# Patient Record
Sex: FEMALE | Race: WHITE | NOT HISPANIC OR LATINO | Employment: OTHER | URBAN - METROPOLITAN AREA
[De-identification: names, ages, dates, MRNs, and addresses within clinical notes are randomized per-mention and may not be internally consistent; named-entity substitution may affect disease eponyms.]

---

## 2024-05-06 PROCEDURE — 94761 N-INVAS EAR/PLS OXIMETRY MLT: CPT

## 2024-05-07 ENCOUNTER — APPOINTMENT (OUTPATIENT)
Dept: CARDIOLOGY | Facility: HOSPITAL | Age: 64
DRG: 189 | End: 2024-05-07
Payer: MEDICARE

## 2024-05-07 ENCOUNTER — APPOINTMENT (OUTPATIENT)
Dept: ULTRASOUND IMAGING | Facility: HOSPITAL | Age: 64
DRG: 189 | End: 2024-05-07
Payer: MEDICARE

## 2024-05-07 ENCOUNTER — HOSPITAL ENCOUNTER (INPATIENT)
Facility: HOSPITAL | Age: 64
LOS: 7 days | Discharge: HOME | DRG: 189 | End: 2024-05-14
Attending: INTERNAL MEDICINE | Admitting: INTERNAL MEDICINE
Payer: MEDICARE

## 2024-05-07 DIAGNOSIS — J44.1 CHRONIC OBSTRUCTIVE PULMONARY DISEASE WITH ACUTE EXACERBATION: ICD-10-CM

## 2024-05-07 DIAGNOSIS — R91.8 LUNG MASS: Primary | ICD-10-CM

## 2024-05-07 DIAGNOSIS — J96.01 ACUTE HYPOXIC RESPIRATORY FAILURE: ICD-10-CM

## 2024-05-07 LAB
ALBUMIN SERPL-MCNC: 3 G/DL (ref 3.5–5.2)
ALBUMIN/GLOB SERPL: 1.1 G/DL
ALP SERPL-CCNC: 85 U/L (ref 39–117)
ALT SERPL W P-5'-P-CCNC: 16 U/L (ref 1–33)
ANION GAP SERPL CALCULATED.3IONS-SCNC: 10 MMOL/L (ref 5–15)
AORTIC DIMENSIONLESS INDEX: 0.87 (DI)
ARTERIAL PATENCY WRIST A: ABNORMAL
AST SERPL-CCNC: 19 U/L (ref 1–32)
ATMOSPHERIC PRESS: ABNORMAL MM[HG]
BASE EXCESS BLDA CALC-SCNC: 8.3 MMOL/L (ref 0–3)
BASOPHILS # BLD AUTO: 0.02 10*3/MM3 (ref 0–0.2)
BASOPHILS NFR BLD AUTO: 0.2 % (ref 0–1.5)
BDY SITE: ABNORMAL
BH CV ECHO MEAS - AO MAX PG: 7.4 MMHG
BH CV ECHO MEAS - AO MEAN PG: 4 MMHG
BH CV ECHO MEAS - AO V2 MAX: 136 CM/SEC
BH CV ECHO MEAS - AO V2 VTI: 23.1 CM
BH CV ECHO MEAS - AVA(I,D): 3.5 CM2
BH CV ECHO MEAS - EDV(CUBED): 132.7 ML
BH CV ECHO MEAS - EDV(MOD-SP2): 74.5 ML
BH CV ECHO MEAS - EDV(MOD-SP4): 65.5 ML
BH CV ECHO MEAS - EF(MOD-BP): 59.3 %
BH CV ECHO MEAS - EF(MOD-SP2): 60.1 %
BH CV ECHO MEAS - EF(MOD-SP4): 64.3 %
BH CV ECHO MEAS - ESV(CUBED): 39.3 ML
BH CV ECHO MEAS - ESV(MOD-SP2): 29.7 ML
BH CV ECHO MEAS - ESV(MOD-SP4): 23.4 ML
BH CV ECHO MEAS - FS: 33.3 %
BH CV ECHO MEAS - IVS/LVPW: 1.13 CM
BH CV ECHO MEAS - IVSD: 0.9 CM
BH CV ECHO MEAS - LA DIMENSION: 4.7 CM
BH CV ECHO MEAS - LAT PEAK E' VEL: 8.1 CM/SEC
BH CV ECHO MEAS - LV DIASTOLIC VOL/BSA (35-75): 31.1 CM2
BH CV ECHO MEAS - LV MASS(C)D: 151.8 GRAMS
BH CV ECHO MEAS - LV MAX PG: 5.6 MMHG
BH CV ECHO MEAS - LV MEAN PG: 3 MMHG
BH CV ECHO MEAS - LV SYSTOLIC VOL/BSA (12-30): 11.1 CM2
BH CV ECHO MEAS - LV V1 MAX: 118 CM/SEC
BH CV ECHO MEAS - LV V1 VTI: 19.7 CM
BH CV ECHO MEAS - LVIDD: 5.1 CM
BH CV ECHO MEAS - LVIDS: 3.4 CM
BH CV ECHO MEAS - LVOT AREA: 4.2 CM2
BH CV ECHO MEAS - LVOT DIAM: 2.3 CM
BH CV ECHO MEAS - LVPWD: 0.8 CM
BH CV ECHO MEAS - MED PEAK E' VEL: 7.2 CM/SEC
BH CV ECHO MEAS - MR MAX PG: 28.7 MMHG
BH CV ECHO MEAS - MR MAX VEL: 268 CM/SEC
BH CV ECHO MEAS - MV A MAX VEL: 129 CM/SEC
BH CV ECHO MEAS - MV DEC SLOPE: 647 CM/SEC2
BH CV ECHO MEAS - MV DEC TIME: 0.19 SEC
BH CV ECHO MEAS - MV E MAX VEL: 130 CM/SEC
BH CV ECHO MEAS - MV E/A: 1.01
BH CV ECHO MEAS - MV MAX PG: 8.1 MMHG
BH CV ECHO MEAS - MV MEAN PG: 5 MMHG
BH CV ECHO MEAS - MV P1/2T: 46.6 MSEC
BH CV ECHO MEAS - MV V2 VTI: 29.5 CM
BH CV ECHO MEAS - MVA(P1/2T): 4.7 CM2
BH CV ECHO MEAS - MVA(VTI): 2.8 CM2
BH CV ECHO MEAS - PA ACC TIME: 0.09 SEC
BH CV ECHO MEAS - PA V2 MAX: 85.8 CM/SEC
BH CV ECHO MEAS - RAP SYSTOLE: 15 MMHG
BH CV ECHO MEAS - RV MAX PG: 2.07 MMHG
BH CV ECHO MEAS - RV V1 MAX: 71.9 CM/SEC
BH CV ECHO MEAS - RV V1 VTI: 13.1 CM
BH CV ECHO MEAS - RVSP: 29.1 MMHG
BH CV ECHO MEAS - SV(LVOT): 81.8 ML
BH CV ECHO MEAS - SV(MOD-SP2): 44.8 ML
BH CV ECHO MEAS - SV(MOD-SP4): 42.1 ML
BH CV ECHO MEAS - SVI(LVOT): 38.8 ML/M2
BH CV ECHO MEAS - SVI(MOD-SP2): 21.3 ML/M2
BH CV ECHO MEAS - SVI(MOD-SP4): 20 ML/M2
BH CV ECHO MEAS - TAPSE (>1.6): 2.29 CM
BH CV ECHO MEAS - TR MAX PG: 14.1 MMHG
BH CV ECHO MEAS - TR MAX VEL: 188 CM/SEC
BH CV ECHO MEASUREMENTS AVERAGE E/E' RATIO: 16.99
BH CV XLRA - TDI S': 9 CM/SEC
BILIRUB SERPL-MCNC: 1.6 MG/DL (ref 0–1.2)
BUN SERPL-MCNC: 12 MG/DL (ref 8–23)
BUN/CREAT SERPL: 20.3 (ref 7–25)
CALCIUM SPEC-SCNC: 8.3 MG/DL (ref 8.6–10.5)
CHLORIDE SERPL-SCNC: 99 MMOL/L (ref 98–107)
CHOLEST SERPL-MCNC: 81 MG/DL (ref 0–200)
CO2 BLDA-SCNC: 43 MMOL/L (ref 22–29)
CO2 SERPL-SCNC: 31 MMOL/L (ref 22–29)
CREAT SERPL-MCNC: 0.59 MG/DL (ref 0.57–1)
DEPRECATED RDW RBC AUTO: 56.5 FL (ref 37–54)
EGFRCR SERPLBLD CKD-EPI 2021: 100.2 ML/MIN/1.73
EOSINOPHIL # BLD AUTO: 0.03 10*3/MM3 (ref 0–0.4)
EOSINOPHIL NFR BLD AUTO: 0.3 % (ref 0.3–6.2)
ERYTHROCYTE [DISTWIDTH] IN BLOOD BY AUTOMATED COUNT: 17.4 % (ref 12.3–15.4)
GLOBULIN UR ELPH-MCNC: 2.8 GM/DL
GLUCOSE BLDC GLUCOMTR-MCNC: 114 MG/DL (ref 70–105)
GLUCOSE SERPL-MCNC: 102 MG/DL (ref 65–99)
HBA1C MFR BLD: 5.9 % (ref 4.8–5.6)
HCO3 BLDA-SCNC: 40.5 MMOL/L (ref 21–28)
HCT VFR BLD AUTO: 59.9 % (ref 34–46.6)
HDLC SERPL-MCNC: 34 MG/DL (ref 40–60)
HEMODILUTION: NO
HGB BLD-MCNC: 18.4 G/DL (ref 12–15.9)
IMM GRANULOCYTES # BLD AUTO: 0.02 10*3/MM3 (ref 0–0.05)
IMM GRANULOCYTES NFR BLD AUTO: 0.2 % (ref 0–0.5)
INHALED O2 CONCENTRATION: 36 %
LDLC SERPL CALC-MCNC: 33 MG/DL (ref 0–100)
LDLC/HDLC SERPL: 1.03 {RATIO}
LYMPHOCYTES # BLD AUTO: 1.15 10*3/MM3 (ref 0.7–3.1)
LYMPHOCYTES NFR BLD AUTO: 12.9 % (ref 19.6–45.3)
MCH RBC QN AUTO: 29.1 PG (ref 26.6–33)
MCHC RBC AUTO-ENTMCNC: 30.7 G/DL (ref 31.5–35.7)
MCV RBC AUTO: 94.6 FL (ref 79–97)
MODALITY: ABNORMAL
MONOCYTES # BLD AUTO: 0.6 10*3/MM3 (ref 0.1–0.9)
MONOCYTES NFR BLD AUTO: 6.7 % (ref 5–12)
NEUTROPHILS NFR BLD AUTO: 7.08 10*3/MM3 (ref 1.7–7)
NEUTROPHILS NFR BLD AUTO: 79.7 % (ref 42.7–76)
NRBC BLD AUTO-RTO: 0 /100 WBC (ref 0–0.2)
NT-PROBNP SERPL-MCNC: 1213 PG/ML (ref 0–900)
PCO2 BLDA: 81.3 MM HG (ref 35–48)
PH BLDA: 7.3 PH UNITS (ref 7.35–7.45)
PLATELET # BLD AUTO: 108 10*3/MM3 (ref 140–450)
PMV BLD AUTO: 10.2 FL (ref 6–12)
PO2 BLDA: 75.8 MM HG (ref 83–108)
POTASSIUM SERPL-SCNC: 3.8 MMOL/L (ref 3.5–5.2)
PROT SERPL-MCNC: 5.8 G/DL (ref 6–8.5)
RBC # BLD AUTO: 6.33 10*6/MM3 (ref 3.77–5.28)
RBC MORPH BLD: NORMAL
SAO2 % BLDCOA: 92.4 % (ref 94–98)
SINUS: 3.8 CM
SMALL PLATELETS BLD QL SMEAR: NORMAL
SODIUM SERPL-SCNC: 140 MMOL/L (ref 136–145)
STJ: 3.3 CM
TRIGL SERPL-MCNC: 60 MG/DL (ref 0–150)
TSH SERPL DL<=0.05 MIU/L-ACNC: 0.61 UIU/ML (ref 0.27–4.2)
VLDLC SERPL-MCNC: 14 MG/DL (ref 5–40)
WBC MORPH BLD: NORMAL
WBC NRBC COR # BLD AUTO: 8.9 10*3/MM3 (ref 3.4–10.8)

## 2024-05-07 PROCEDURE — 99222 1ST HOSP IP/OBS MODERATE 55: CPT | Performed by: INTERNAL MEDICINE

## 2024-05-07 PROCEDURE — 25010000002 FUROSEMIDE PER 20 MG: Performed by: NURSE PRACTITIONER

## 2024-05-07 PROCEDURE — 25010000002 SULFUR HEXAFLUORIDE MICROSPH 60.7-25 MG RECONSTITUTED SUSPENSION: Performed by: STUDENT IN AN ORGANIZED HEALTH CARE EDUCATION/TRAINING PROGRAM

## 2024-05-07 PROCEDURE — 83036 HEMOGLOBIN GLYCOSYLATED A1C: CPT | Performed by: NURSE PRACTITIONER

## 2024-05-07 PROCEDURE — 83880 ASSAY OF NATRIURETIC PEPTIDE: CPT | Performed by: NURSE PRACTITIONER

## 2024-05-07 PROCEDURE — 80061 LIPID PANEL: CPT | Performed by: NURSE PRACTITIONER

## 2024-05-07 PROCEDURE — 85007 BL SMEAR W/DIFF WBC COUNT: CPT | Performed by: NURSE PRACTITIONER

## 2024-05-07 PROCEDURE — 82948 REAGENT STRIP/BLOOD GLUCOSE: CPT

## 2024-05-07 PROCEDURE — 25010000002 METHYLPREDNISOLONE PER 40 MG: Performed by: STUDENT IN AN ORGANIZED HEALTH CARE EDUCATION/TRAINING PROGRAM

## 2024-05-07 PROCEDURE — 93306 TTE W/DOPPLER COMPLETE: CPT | Performed by: INTERNAL MEDICINE

## 2024-05-07 PROCEDURE — 85025 COMPLETE CBC W/AUTO DIFF WBC: CPT | Performed by: NURSE PRACTITIONER

## 2024-05-07 PROCEDURE — 94761 N-INVAS EAR/PLS OXIMETRY MLT: CPT

## 2024-05-07 PROCEDURE — 84443 ASSAY THYROID STIM HORMONE: CPT | Performed by: NURSE PRACTITIONER

## 2024-05-07 PROCEDURE — 82746 ASSAY OF FOLIC ACID SERUM: CPT | Performed by: NURSE PRACTITIONER

## 2024-05-07 PROCEDURE — 94799 UNLISTED PULMONARY SVC/PX: CPT

## 2024-05-07 PROCEDURE — 94640 AIRWAY INHALATION TREATMENT: CPT

## 2024-05-07 PROCEDURE — 82803 BLOOD GASES ANY COMBINATION: CPT | Performed by: STUDENT IN AN ORGANIZED HEALTH CARE EDUCATION/TRAINING PROGRAM

## 2024-05-07 PROCEDURE — 81270 JAK2 GENE: CPT | Performed by: NURSE PRACTITIONER

## 2024-05-07 PROCEDURE — 93306 TTE W/DOPPLER COMPLETE: CPT

## 2024-05-07 PROCEDURE — 82607 VITAMIN B-12: CPT | Performed by: NURSE PRACTITIONER

## 2024-05-07 PROCEDURE — 36600 WITHDRAWAL OF ARTERIAL BLOOD: CPT | Performed by: STUDENT IN AN ORGANIZED HEALTH CARE EDUCATION/TRAINING PROGRAM

## 2024-05-07 PROCEDURE — 80053 COMPREHEN METABOLIC PANEL: CPT | Performed by: NURSE PRACTITIONER

## 2024-05-07 PROCEDURE — 76705 ECHO EXAM OF ABDOMEN: CPT

## 2024-05-07 RX ORDER — NITROGLYCERIN 0.4 MG/1
0.4 TABLET SUBLINGUAL
Status: DISCONTINUED | OUTPATIENT
Start: 2024-05-07 | End: 2024-05-14 | Stop reason: HOSPADM

## 2024-05-07 RX ORDER — SODIUM CHLORIDE 0.9 % (FLUSH) 0.9 %
10 SYRINGE (ML) INJECTION AS NEEDED
Status: DISCONTINUED | OUTPATIENT
Start: 2024-05-07 | End: 2024-05-14 | Stop reason: HOSPADM

## 2024-05-07 RX ORDER — ALPRAZOLAM 1 MG/1
1 TABLET ORAL 3 TIMES DAILY PRN
Status: DISCONTINUED | OUTPATIENT
Start: 2024-05-07 | End: 2024-05-07

## 2024-05-07 RX ORDER — ALBUTEROL SULFATE 90 UG/1
2 AEROSOL, METERED RESPIRATORY (INHALATION) EVERY 4 HOURS PRN
COMMUNITY

## 2024-05-07 RX ORDER — ONDANSETRON 2 MG/ML
4 INJECTION INTRAMUSCULAR; INTRAVENOUS EVERY 6 HOURS PRN
Status: DISCONTINUED | OUTPATIENT
Start: 2024-05-07 | End: 2024-05-14 | Stop reason: HOSPADM

## 2024-05-07 RX ORDER — ALPRAZOLAM 1 MG/1
1 TABLET ORAL 3 TIMES DAILY PRN
COMMUNITY

## 2024-05-07 RX ORDER — PREDNISONE 20 MG/1
40 TABLET ORAL
Status: DISCONTINUED | OUTPATIENT
Start: 2024-05-08 | End: 2024-05-07

## 2024-05-07 RX ORDER — METHYLPREDNISOLONE SODIUM SUCCINATE 40 MG/ML
40 INJECTION, POWDER, LYOPHILIZED, FOR SOLUTION INTRAMUSCULAR; INTRAVENOUS EVERY 8 HOURS SCHEDULED
Status: DISCONTINUED | OUTPATIENT
Start: 2024-05-07 | End: 2024-05-07

## 2024-05-07 RX ORDER — IPRATROPIUM BROMIDE AND ALBUTEROL SULFATE 2.5; .5 MG/3ML; MG/3ML
3 SOLUTION RESPIRATORY (INHALATION)
Status: DISCONTINUED | OUTPATIENT
Start: 2024-05-07 | End: 2024-05-14 | Stop reason: HOSPADM

## 2024-05-07 RX ORDER — METOPROLOL SUCCINATE 25 MG/1
25 TABLET, EXTENDED RELEASE ORAL EVERY 12 HOURS SCHEDULED
Status: DISCONTINUED | OUTPATIENT
Start: 2024-05-07 | End: 2024-05-14 | Stop reason: HOSPADM

## 2024-05-07 RX ORDER — BUDESONIDE AND FORMOTEROL FUMARATE DIHYDRATE 160; 4.5 UG/1; UG/1
2 AEROSOL RESPIRATORY (INHALATION)
Status: DISCONTINUED | OUTPATIENT
Start: 2024-05-07 | End: 2024-05-07

## 2024-05-07 RX ORDER — AMOXICILLIN 250 MG
2 CAPSULE ORAL 2 TIMES DAILY PRN
Status: DISCONTINUED | OUTPATIENT
Start: 2024-05-07 | End: 2024-05-14 | Stop reason: HOSPADM

## 2024-05-07 RX ORDER — ACETAMINOPHEN 325 MG/1
650 TABLET ORAL EVERY 8 HOURS PRN
Status: DISCONTINUED | OUTPATIENT
Start: 2024-05-07 | End: 2024-05-14 | Stop reason: HOSPADM

## 2024-05-07 RX ORDER — POTASSIUM CHLORIDE 20 MEQ/1
20 TABLET, EXTENDED RELEASE ORAL 2 TIMES DAILY WITH MEALS
Status: DISCONTINUED | OUTPATIENT
Start: 2024-05-07 | End: 2024-05-14 | Stop reason: HOSPADM

## 2024-05-07 RX ORDER — ALPRAZOLAM 1 MG/1
1 TABLET ORAL 3 TIMES DAILY
Status: DISCONTINUED | OUTPATIENT
Start: 2024-05-07 | End: 2024-05-11

## 2024-05-07 RX ORDER — FLUOXETINE 10 MG/1
10 CAPSULE ORAL DAILY
COMMUNITY

## 2024-05-07 RX ORDER — FAMOTIDINE 20 MG/1
40 TABLET, FILM COATED ORAL DAILY
Status: DISCONTINUED | OUTPATIENT
Start: 2024-05-07 | End: 2024-05-07

## 2024-05-07 RX ORDER — BUDESONIDE 0.5 MG/2ML
0.5 INHALANT ORAL
Status: DISCONTINUED | OUTPATIENT
Start: 2024-05-07 | End: 2024-05-14 | Stop reason: HOSPADM

## 2024-05-07 RX ORDER — SODIUM CHLORIDE 9 MG/ML
40 INJECTION, SOLUTION INTRAVENOUS AS NEEDED
Status: DISCONTINUED | OUTPATIENT
Start: 2024-05-07 | End: 2024-05-14 | Stop reason: HOSPADM

## 2024-05-07 RX ORDER — FUROSEMIDE 10 MG/ML
40 INJECTION INTRAMUSCULAR; INTRAVENOUS EVERY 12 HOURS
Status: DISCONTINUED | OUTPATIENT
Start: 2024-05-07 | End: 2024-05-11

## 2024-05-07 RX ORDER — METHYLPREDNISOLONE SODIUM SUCCINATE 40 MG/ML
40 INJECTION, POWDER, LYOPHILIZED, FOR SOLUTION INTRAMUSCULAR; INTRAVENOUS EVERY 12 HOURS
Status: DISCONTINUED | OUTPATIENT
Start: 2024-05-07 | End: 2024-05-07

## 2024-05-07 RX ORDER — FLUOXETINE 10 MG/1
10 CAPSULE ORAL DAILY
Status: DISCONTINUED | OUTPATIENT
Start: 2024-05-07 | End: 2024-05-14 | Stop reason: HOSPADM

## 2024-05-07 RX ORDER — NICOTINE 21 MG/24HR
1 PATCH, TRANSDERMAL 24 HOURS TRANSDERMAL
Status: DISCONTINUED | OUTPATIENT
Start: 2024-05-07 | End: 2024-05-14 | Stop reason: HOSPADM

## 2024-05-07 RX ORDER — SODIUM CHLORIDE 0.9 % (FLUSH) 0.9 %
10 SYRINGE (ML) INJECTION EVERY 12 HOURS SCHEDULED
Status: DISCONTINUED | OUTPATIENT
Start: 2024-05-07 | End: 2024-05-14 | Stop reason: HOSPADM

## 2024-05-07 RX ORDER — FLUTICASONE PROPIONATE AND SALMETEROL 250; 50 UG/1; UG/1
POWDER RESPIRATORY (INHALATION)
COMMUNITY
End: 2024-05-14 | Stop reason: HOSPADM

## 2024-05-07 RX ORDER — POLYETHYLENE GLYCOL 3350 17 G/17G
17 POWDER, FOR SOLUTION ORAL DAILY PRN
Status: DISCONTINUED | OUTPATIENT
Start: 2024-05-07 | End: 2024-05-14 | Stop reason: HOSPADM

## 2024-05-07 RX ORDER — AMLODIPINE BESYLATE 5 MG/1
5 TABLET ORAL DAILY
Status: DISCONTINUED | OUTPATIENT
Start: 2024-05-07 | End: 2024-05-07

## 2024-05-07 RX ORDER — BISACODYL 10 MG
10 SUPPOSITORY, RECTAL RECTAL DAILY PRN
Status: DISCONTINUED | OUTPATIENT
Start: 2024-05-07 | End: 2024-05-14 | Stop reason: HOSPADM

## 2024-05-07 RX ORDER — FLUOXETINE HYDROCHLORIDE 20 MG/1
20 CAPSULE ORAL DAILY
COMMUNITY

## 2024-05-07 RX ORDER — FLUOXETINE HYDROCHLORIDE 20 MG/1
20 CAPSULE ORAL DAILY
Status: DISCONTINUED | OUTPATIENT
Start: 2024-05-07 | End: 2024-05-14 | Stop reason: HOSPADM

## 2024-05-07 RX ORDER — AMLODIPINE BESYLATE 5 MG/1
5 TABLET ORAL DAILY
COMMUNITY
End: 2024-05-14 | Stop reason: HOSPADM

## 2024-05-07 RX ORDER — IPRATROPIUM BROMIDE AND ALBUTEROL SULFATE 2.5; .5 MG/3ML; MG/3ML
3 SOLUTION RESPIRATORY (INHALATION) EVERY 4 HOURS PRN
Status: DISCONTINUED | OUTPATIENT
Start: 2024-05-07 | End: 2024-05-14 | Stop reason: HOSPADM

## 2024-05-07 RX ORDER — METHYLPREDNISOLONE SODIUM SUCCINATE 40 MG/ML
40 INJECTION, POWDER, LYOPHILIZED, FOR SOLUTION INTRAMUSCULAR; INTRAVENOUS EVERY 12 HOURS
Status: DISCONTINUED | OUTPATIENT
Start: 2024-05-07 | End: 2024-05-14 | Stop reason: HOSPADM

## 2024-05-07 RX ORDER — BISACODYL 5 MG/1
5 TABLET, DELAYED RELEASE ORAL DAILY PRN
Status: DISCONTINUED | OUTPATIENT
Start: 2024-05-07 | End: 2024-05-14 | Stop reason: HOSPADM

## 2024-05-07 RX ADMIN — ALPRAZOLAM 1 MG: 1 TABLET ORAL at 21:32

## 2024-05-07 RX ADMIN — POTASSIUM CHLORIDE 20 MEQ: 1500 TABLET, EXTENDED RELEASE ORAL at 10:00

## 2024-05-07 RX ADMIN — ALPRAZOLAM 1 MG: 1 TABLET ORAL at 04:12

## 2024-05-07 RX ADMIN — METOPROLOL SUCCINATE 25 MG: 25 TABLET, EXTENDED RELEASE ORAL at 21:32

## 2024-05-07 RX ADMIN — Medication 10 ML: at 21:33

## 2024-05-07 RX ADMIN — ALPRAZOLAM 1 MG: 1 TABLET ORAL at 16:17

## 2024-05-07 RX ADMIN — METHYLPREDNISOLONE SODIUM SUCCINATE 40 MG: 40 INJECTION, POWDER, FOR SOLUTION INTRAMUSCULAR; INTRAVENOUS at 09:59

## 2024-05-07 RX ADMIN — FLUOXETINE 20 MG: 20 CAPSULE ORAL at 10:01

## 2024-05-07 RX ADMIN — IPRATROPIUM BROMIDE AND ALBUTEROL SULFATE 3 ML: .5; 3 SOLUTION RESPIRATORY (INHALATION) at 14:55

## 2024-05-07 RX ADMIN — BUDESONIDE AND FORMOTEROL FUMARATE DIHYDRATE 2 PUFF: 160; 4.5 AEROSOL RESPIRATORY (INHALATION) at 06:20

## 2024-05-07 RX ADMIN — POTASSIUM CHLORIDE 20 MEQ: 1500 TABLET, EXTENDED RELEASE ORAL at 17:58

## 2024-05-07 RX ADMIN — FAMOTIDINE 40 MG: 20 TABLET, FILM COATED ORAL at 10:00

## 2024-05-07 RX ADMIN — FUROSEMIDE 40 MG: 10 INJECTION, SOLUTION INTRAMUSCULAR; INTRAVENOUS at 16:18

## 2024-05-07 RX ADMIN — FUROSEMIDE 40 MG: 10 INJECTION, SOLUTION INTRAMUSCULAR; INTRAVENOUS at 04:11

## 2024-05-07 RX ADMIN — IPRATROPIUM BROMIDE AND ALBUTEROL SULFATE 3 ML: .5; 3 SOLUTION RESPIRATORY (INHALATION) at 11:18

## 2024-05-07 RX ADMIN — BUDESONIDE 0.5 MG: 0.5 INHALANT RESPIRATORY (INHALATION) at 18:55

## 2024-05-07 RX ADMIN — AMLODIPINE BESYLATE 5 MG: 5 TABLET ORAL at 10:00

## 2024-05-07 RX ADMIN — FLUOXETINE 10 MG: 10 CAPSULE ORAL at 10:03

## 2024-05-07 RX ADMIN — IPRATROPIUM BROMIDE AND ALBUTEROL SULFATE 3 ML: .5; 3 SOLUTION RESPIRATORY (INHALATION) at 18:51

## 2024-05-07 RX ADMIN — SULFUR HEXAFLUORIDE 2 ML: KIT at 12:22

## 2024-05-07 RX ADMIN — METHYLPREDNISOLONE SODIUM SUCCINATE 40 MG: 40 INJECTION, POWDER, FOR SOLUTION INTRAMUSCULAR; INTRAVENOUS at 21:32

## 2024-05-07 RX ADMIN — ACETAMINOPHEN 650 MG: 325 TABLET, FILM COATED ORAL at 17:58

## 2024-05-07 RX ADMIN — Medication 10 ML: at 10:02

## 2024-05-07 NOTE — CONSULTS
Hematology/Oncology Inpatient Consultation    Patient name: Jeanne King  : 1960  MRN: [id number]  Referring Provider: Dr. Dunn  Reason for Consultation: Lung lesions    Chief complaint: Leg swelling    History of present illness:    Jeanne King is a 64 y.o. female who [facility].  Past medical history of hypertension, COPD.  She reported that she has not felt well for some time.  She does not like going to the doctor and is anxious about seeking medical treatment.  She reported having shortness of breath and cough that she attributed to lingering effects of COVID infection and did not seek medical treatment.  A CT PE protocol was performed at the [facility] facility and was negative for pulmonary embolism, did show a right lower lobe spiculated mass lesion measuring 2.3 cm and a more distal lesion measuring 1.7 cm along with mediastinal lymphadenopathy with a right hilar node measuring 2.1 cm.  Upper abdomen showed a cirrhotic enlarged liver hence splenomegaly along with abdominal ascites.    24  Hematology/Oncology was consulted for lung lesions suspicious for primary lung malignancy.  The patient reports that she is a current smoker with a 50-year history of smoking on average 1 and half packs per day.  Her family history is positive for lung cancer with her brother and liver cancer and another brother.  She reports that she is not up-to-date with any routine screenings for malignancy.    She has been found to have right lower lobe spiculated mass measuring 2.3 cm, another mass that measures 1.7 cm with mediastinal lymphadenopathy.  There is also evidence of cirrhosis of the liver with portal hypertension.  We have been asked to see patient due to lung lesions.    He/She  has a past medical history of COPD (chronic obstructive pulmonary disease) and Hypertension.    PCP: Provider, No  Known    History:  Past Medical History:   Diagnosis Date    COPD (chronic obstructive pulmonary disease)     Hypertension    ,   Past Surgical History:   Procedure Laterality Date    HYSTERECTOMY     ,   Family History   Problem Relation Age of Onset    No Known Problems Mother     No Known Problems Father     No Known Problems Sister     No Known Problems Brother    ,   Social History     Tobacco Use    Smoking status: Every Day     Current packs/day: 1.50     Average packs/day: 1.5 packs/day for 53.4 years (80.0 ttl pk-yrs)     Types: Cigarettes     Start date: 1971    Smokeless tobacco: Never   Vaping Use    Vaping status: Never Used   Substance Use Topics    Alcohol use: Never    Drug use: Never   ,   Medications Prior to Admission   Medication Sig Dispense Refill Last Dose    ALPRAZolam (XANAX) 1 MG tablet Take 1 tablet by mouth 3 (Three) Times a Day As Needed for Anxiety.   5/6/2024    amLODIPine (NORVASC) 5 MG tablet Take 1 tablet by mouth Daily.   5/6/2024    FLUoxetine (PROzac) 10 MG capsule Take 1 capsule by mouth Daily.   Past Week    FLUoxetine (PROzac) 20 MG capsule Take 1 capsule by mouth Daily.   Past Week    albuterol sulfate  (90 Base) MCG/ACT inhaler Inhale 2 puffs Every 4 (Four) Hours As Needed for Wheezing.       Fluticasone-Salmeterol (ADVAIR/WIXELA) 250-50 MCG/ACT DISKUS Inhale 2 (Two) Times a Day.      , Scheduled Meds:  ALPRAZolam, 1 mg, Oral, TID  budesonide, 0.5 mg, Nebulization, BID - RT  FLUoxetine, 10 mg, Oral, Daily  FLUoxetine, 20 mg, Oral, Daily  folic acid, 1 mg, Oral, Daily  furosemide, 40 mg, Intravenous, Q12H  ipratropium-albuterol, 3 mL, Nebulization, 4x Daily - RT  methylPREDNISolone sodium succinate, 40 mg, Intravenous, Q12H  metoprolol succinate XL, 25 mg, Oral, Q12H  nicotine, 1 patch, Transdermal, Q24H  potassium chloride, 20 mEq, Oral, BID With Meals  sodium chloride, 10 mL, Intravenous, Q12H    , Continuous Infusions:   , PRN Meds:    acetaminophen     "senna-docusate sodium **AND** polyethylene glycol **AND** bisacodyl **AND** bisacodyl    ipratropium-albuterol    nitroglycerin    ondansetron    sodium chloride    sodium chloride   Allergies:  Keflex [cephalexin], Morphine, and Premarin [conjugated estrogens]    Subjective     ROS:  Review of Systems   Constitutional:  Positive for fatigue. Negative for chills and fever.   HENT:  Negative for congestion, drooling, ear discharge, rhinorrhea, sinus pressure and tinnitus.    Eyes:  Negative for photophobia, pain and discharge.   Respiratory:  Positive for cough and shortness of breath. Negative for apnea, choking and stridor.    Cardiovascular:  Positive for leg swelling. Negative for palpitations.   Gastrointestinal:  Negative for abdominal distention, abdominal pain and anal bleeding.   Endocrine: Negative for polydipsia and polyphagia.   Genitourinary:  Negative for decreased urine volume, flank pain and genital sores.   Musculoskeletal:  Negative for gait problem, neck pain and neck stiffness.   Skin:  Negative for color change, rash and wound.   Neurological:  Negative for tremors, seizures, syncope, facial asymmetry and speech difficulty.   Hematological:  Negative for adenopathy.   Psychiatric/Behavioral:  Negative for agitation, confusion, hallucinations and self-injury. The patient is not hyperactive.         Objective   Vital Signs:   /67 (BP Location: Right arm, Patient Position: Lying)   Pulse 56   Temp 98.2 °F (36.8 °C) (Oral)   Resp 16   Ht 172.7 cm (68\")   Wt 99 kg (218 lb 4.1 oz)   SpO2 92%   BMI 33.19 kg/m²     Physical Exam: (performed by MD)  Physical Exam  Vitals and nursing note reviewed.   Constitutional:       General: She is not in acute distress.     Appearance: She is ill-appearing. She is not diaphoretic.   HENT:      Head: Normocephalic and atraumatic.   Eyes:      General: No scleral icterus.        Right eye: No discharge.         Left eye: No discharge.      " Conjunctiva/sclera: Conjunctivae normal.   Neck:      Thyroid: No thyromegaly.   Cardiovascular:      Rate and Rhythm: Normal rate and regular rhythm.      Heart sounds: Normal heart sounds.      No friction rub. No gallop.   Pulmonary:      Effort: Pulmonary effort is normal. No respiratory distress.      Breath sounds: No stridor. Rhonchi present. No wheezing.   Abdominal:      General: Bowel sounds are normal.      Palpations: Abdomen is soft. There is no mass.      Tenderness: There is no abdominal tenderness. There is no guarding or rebound.   Musculoskeletal:         General: No tenderness. Normal range of motion.      Cervical back: Normal range of motion and neck supple.   Lymphadenopathy:      Cervical: No cervical adenopathy.   Skin:     General: Skin is warm.      Findings: No erythema or rash.   Neurological:      Mental Status: She is alert and oriented to person, place, and time.      Motor: No abnormal muscle tone.   Psychiatric:         Behavior: Behavior normal.         Results Review:  Lab Results (last 48 hours)       Procedure Component Value Units Date/Time    Blood Gas, Arterial - [[id number]]  (Abnormal) Collected: 05/07/24 0810    Specimen: Arterial Blood Updated: 05/07/24 0820     Site Left Brachial     Karan's Test N/A     pH, Arterial 7.305 pH units      pCO2, Arterial 81.3 mm Hg      pO2, Arterial 75.8 mm Hg      HCO3, Arterial 40.5 mmol/L      Base Excess, Arterial 8.3 mmol/L      Comment: Serial Number: [id number]:  [id number]        O2 Saturation, Arterial 92.4 %      CO2 Content 43.0 mmol/L      Barometric Pressure for Blood Gas --     Comment: N/A        Modality Cannula     FIO2 36 %      Hemodilution No    POC Glucose Once [[id number]]  (Abnormal) Collected: 05/07/24 0139    Specimen: Blood Updated: 05/07/24 0658     Glucose 114 mg/dL      Comment: Serial Number: [id number]:  [id number]       CBC Auto Differential [[id number]]  (Abnormal) Collected: 05/07/24 0419    Specimen:  Blood Updated: 05/07/24 0527     WBC 8.90 10*3/mm3      RBC 6.33 10*6/mm3      Hemoglobin 18.4 g/dL      Hematocrit 59.9 %      MCV 94.6 fL      MCH 29.1 pg      MCHC 30.7 g/dL      RDW 17.4 %      RDW-SD 56.5 fl      MPV 10.2 fL      Platelets 108 10*3/mm3      Neutrophil % 79.7 %      Lymphocyte % 12.9 %      Monocyte % 6.7 %      Eosinophil % 0.3 %      Basophil % 0.2 %      Immature Grans % 0.2 %      Neutrophils, Absolute 7.08 10*3/mm3      Lymphocytes, Absolute 1.15 10*3/mm3      Monocytes, Absolute 0.60 10*3/mm3      Eosinophils, Absolute 0.03 10*3/mm3      Basophils, Absolute 0.02 10*3/mm3      Immature Grans, Absolute 0.02 10*3/mm3      nRBC 0.0 /100 WBC     Scan Slide [[id number]] Collected: 05/07/24 0419    Specimen: Blood Updated: 05/07/24 0527     RBC Morphology Normal     WBC Morphology Normal     Platelet Estimate Decreased    Hemoglobin A1c [[id number]]  (Abnormal) Collected: 05/07/24 0419    Specimen: Blood Updated: 05/07/24 0517     Hemoglobin A1C 5.90 %     TSH Rfx On Abnormal To Free T4 [[id number]]  (Normal) Collected: 05/07/24 0419    Specimen: Blood Updated: 05/07/24 0507     TSH 0.611 uIU/mL     BNP [[id number]]  (Abnormal) Collected: 05/07/24 0419    Specimen: Blood Updated: 05/07/24 0507     proBNP 1,213.0 pg/mL     Narrative:      This assay is used as an aid in the diagnosis of individuals suspected of having heart failure. It can be used as an aid in the diagnosis of acute decompensated heart failure (ADHF) in patients presenting with signs and symptoms of ADHF to the emergency department (ED). In addition, NT-proBNP of <300 pg/mL indicates ADHF is not likely.    Age Range Result Interpretation  NT-proBNP Concentration (pg/mL:      <50             Positive            >450                   Gray                 300-450                    Negative             <300    50-75           Positive            >900                  Gray                300-900                  Negative             <300      >75             Positive            >1800                  Gray                300-1800                  Negative            <300    Comprehensive Metabolic Panel [[id number]]  (Abnormal) Collected: 05/07/24 0419    Specimen: Blood Updated: 05/07/24 0506     Glucose 102 mg/dL      BUN 12 mg/dL      Creatinine 0.59 mg/dL      Sodium 140 mmol/L      Potassium 3.8 mmol/L      Comment: Specimen hemolyzed.  Result may be falsely elevated.        Chloride 99 mmol/L      CO2 31.0 mmol/L      Calcium 8.3 mg/dL      Total Protein 5.8 g/dL      Albumin 3.0 g/dL      ALT (SGPT) 16 U/L      Comment: Specimen hemolyzed.  Result may  be falsely elevated.        AST (SGOT) 19 U/L      Comment: Specimen hemolyzed.  Result may be falsely elevated.        Alkaline Phosphatase 85 U/L      Total Bilirubin 1.6 mg/dL      Globulin 2.8 gm/dL      A/G Ratio 1.1 g/dL      BUN/Creatinine Ratio 20.3     Anion Gap 10.0 mmol/L      eGFR 100.2 mL/min/1.73     Narrative:      GFR Normal >60  Chronic Kidney Disease <60  Kidney Failure <15      Lipid Panel [[id number]]  (Abnormal) Collected: 05/07/24 0419    Specimen: Blood Updated: 05/07/24 0505     Total Cholesterol 81 mg/dL      Triglycerides 60 mg/dL      HDL Cholesterol 34 mg/dL      LDL Cholesterol  33 mg/dL      VLDL Cholesterol 14 mg/dL      LDL/HDL Ratio 1.03    Narrative:      Cholesterol Reference Ranges  (U.S. Department of Health and Human Services ATP III Classifications)    Desirable          <200 mg/dL  Borderline High    200-239 mg/dL  High Risk          >240 mg/dL      Triglyceride Reference Ranges  (U.S. Department of Health and Human Services ATP III Classifications)    Normal           <150 mg/dL  Borderline High  150-199 mg/dL  High             200-499 mg/dL  Very High        >500 mg/dL    HDL Reference Ranges  (U.S. Department of Health and Human Services ATP III Classifications)    Low     <40 mg/dl (major risk factor for CHD)  High    >60 mg/dl ('negative' risk  factor for CHD)        LDL Reference Ranges  (U.S. Department of Health and Human Services ATP III Classifications)    Optimal          <100 mg/dL  Near Optimal     100-129 mg/dL  Borderline High  130-159 mg/dL  High             160-189 mg/dL  Very High        >189 mg/dL             Pending Results: JAK2, EPO, B12, folate    Imaging Reviewed:   US Liver    Result Date: 5/7/2024  1. Hepatomegaly. 2. Subtle nodular surface contour of the liver with mildly coarsened echotexture, raising the possibility of cirrhosis. 3. No focal liver lesion is seen. 4. No ascites is seen.  Electronically Signed By-Jane Woodruff MD On:5/7/2024 7:33 AM            Assessment & Plan   ASSESSMENT  Lung masses with mediastinal lymphadenopathy: Right lower lobe spiculated mass measuring 2.3 cm and more distal mass measuring 1.7 cm with mediastinal lymphadenopathy.  Polycythemia: Hemoglobin 18.2 g/dL/hematocrit 61.9, normal WBC.  EPO level pending.  JAK2 negative.  Thrombocytopenia: No baseline.  In the setting of cirrhosis and splenomegaly.  Will assess for nutritional deficiencies.  Platelets have normalized 242,000.  Normal B12.  Folate low at 4.27.  Begin folic acid 1 mg p.o. daily x 3 months.    PLAN  EBUS in the a.m.  EPO level pending  Folate low, will replace  Further recommendations per Dr. Castillo    Electronically signed by FRANCIS Morton, 05/07/24, 10:08 AM EDT.    Thank you for this consult. We will be happy to follow along with you.      Hematology/Oncology was consulted for lung lesions suspicious for primary lung malignancy.  The patient reports that she is a current smoker with a 50-year history of smoking on average 1 and half packs per day.  Her family history is positive for lung cancer with her brother and liver cancer and another brother.  She reports that she is not up-to-date with any routine screenings for malignancy.    She has been found to have right lower lobe spiculated mass measuring 2.3 cm, another  mass that measures 1.7 cm with mediastinal lymphadenopathy.  There is also evidence of cirrhosis of the liver with portal hypertension.  We have been asked to see patient due to lung lesions.    Physical exam significant for ill appearance, bilateral rhonchi  I reviewed her labs, notes  Patient is scheduled for EBUS in the morning  Discussed the possibilities with her  If malignancy is confirmed then we will plan for outpatient PET CT scan, brain MRI  She has polycythemia, JAK2 is negative and EPO level is still pending  Her hemoglobin remains at 18 g per DL    Discussed with patient    If discharged, will see her in the outpatient setting      Electronically signed by Laury Castillo MD, 05/09/24, 8:18 PM EDT.

## 2024-05-07 NOTE — H&P
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 Placed initially on 6L then escalted to 30L high flow. Briefly on Bi-pap. Now sats 91% on 5L NC   - CT chest: 1. No pulmonary emboli 2. Bilateral rond glass patchy opacities nonspecific suggestion of pulmonary edema.  A right lower lobe 2.3 cm spiculated mass lesion, another more distal lesion 1.7 cm.  These lesions are highly concerning for primary lung neoplasm and recommend biopsy or PET/CT exam.  Lingular atelectasis.  No pneumothorax or pleural effusion.  Mediastinal and hilar lymphadenopathy.  #3 cardiomegaly and coronary artery calcifications.  #4 cirrhotic enlarged liver.  Splenomegaly.  Abdominal ascites.  - afebrile. Negative for cough or fever. Negative leukocytosis. Does not appear to have pneumonia  - VBG: pH 7.53, PCO2 35, Po2 101, bicarb 29 O2 98%   - will order 2D echo to evaluate cardiomegaly  - will order US liver to evaluate cirrhosis  - I&O  - daily weight  -duo neb prn   - Pulmonary consult  - consider cardiology, GI, or oncology consult based on results     HTN  - resume home Norvasc    Anxiety  - resume home Xanax and Prozac    COPD  - resume home inhalers Albuterol and Advair    Tobacco dependency  -recommend complete cessation  -nicotine patch as needed    Obesity  - BMI 32.82  - recommend diet and lifestyle changes       Level Of Support Discussed With: Patient  Code Status (Patient has no pulse and is not breathing): CPR (Attempt to Resuscitate)  Medical Interventions (Patient  "has pulse or is breathing): Full Support       Nutrition:   Diet: Cardiac; Healthy Heart (2-3 Na+); Fluid Consistency: Thin (IDDSI 0)     DVT prophylaxis:  Mechanical DVT prophylaxis orders are present.         History of Present illness     Jeanne King is a 64 y.o. female with PMH of hypertension, COPD, anxiety [facility]  Patient reports she has been feeling unwell for \"a long time\".  She does not use oxygen at home.  Uses albuterol and Advair.  Does not like going to the doctor and very anxious and was not willing to seek medical help until her daughter persuaded her due to acute edema to bilateral lower extremities with signs of weeping.  Patient denies any cough fever chills.  Positive for shortness of breath.  Reports family history of sarcoidosis and Rodriguez cirrhosis    Patient reports feels that she \"was not thinking straight\" when she arrived in the ED.  She remembers getting told that her oxygen was in the 70s and she recalls thinking \"that is not bad\".  Once O2 improved to normal range she realized that she had been slightly delirious.  It is reported that she was initially on 6 L nasal cannula but then escalated to 30 L high flow with FiO2 of 60.  She was given 40 of Lasix with approximately 1 L of output.  I requested patient be placed on BiPAP which she did tolerate shortly .VBG showed pH 7.53, pCO2 35, pO2 101, bicarb 29 and O2 sat 98.  COVID was negative negative for leukocytosis.  Hemoglobin 19.4 and platelet 125.  Other labs reviewed potassium 3.4.  High-sensitivity troponin was 34 lactate 1.3 .  CT of the chest was negative for pulmonary emboli.  Concern for primary lung neoplasm with 2 lung lesions identified with mediastinal and hilar lymphadenopathy.  Cardiomegaly and coronary artery calcifications.  Cirrhotic enlarged liver splenomegaly and abdominal ascites.  [facility].  On arrival patient sats 91% on 5 L nasal cannula.  She is " negative for any respiratory distress.  She has been admitted for further testing    Patient was seen and examined on 05/07/24 at 04:13 EDT .    Subjective / Review of systems     Review of Systems   Constitutional:  Positive for activity change and fatigue.   Respiratory:  Positive for shortness of breath.    Cardiovascular:  Positive for leg swelling.          Past Medical/Surgical/Social/Family History & Allergies     Past Medical History:   Diagnosis Date    COPD (chronic obstructive pulmonary disease)     Hypertension       Past Surgical History:   Procedure Laterality Date    HYSTERECTOMY        Social History     Socioeconomic History    Marital status:    Tobacco Use    Smoking status: Every Day     Current packs/day: 1.50     Average packs/day: 1.5 packs/day for 53.3 years (80.0 ttl pk-yrs)     Types: Cigarettes     Start date: 1971    Smokeless tobacco: Never   Vaping Use    Vaping status: Never Used   Substance and Sexual Activity    Alcohol use: Never    Drug use: Never      Family History   Problem Relation Age of Onset    No Known Problems Mother     No Known Problems Father     No Known Problems Sister     No Known Problems Brother       Allergies   Allergen Reactions    Keflex [Cephalexin] Mental Status Change and Provider Review Needed    Morphine Mental Status Change    Premarin [Conjugated Estrogens] Mental Status Change      Social Determinants of Health     Tobacco Use: High Risk (5/7/2024)    Patient History     Smoking Tobacco Use: Every Day     Smokeless Tobacco Use: Never     Passive Exposure: Not on file   Alcohol Use: Not At Risk (5/7/2024)    AUDIT-C     Frequency of Alcohol Consumption: Never     Average Number of Drinks: Patient does not drink     Frequency of Binge Drinking: Never   Financial Resource Strain: Not on file   Food Insecurity: Not on file   Transportation Needs: Not on file   Physical Activity: Not on file   Stress: Not on file   Social Connections: Unknown  (5/6/2024)    Family and Community Support     Help with Day-to-Day Activities: Not on file     Lonely or Isolated: Not on file   Interpersonal Safety: Not At Risk (5/7/2024)    Abuse Screen     Unsafe at Home or Work/School: no     Feels Threatened by Someone?: no     Does Anyone Keep You from Contacting Others or Doint Things Outside the Home?: no     Physical Sign of Abuse Present: no   Depression: Not on file   Housing Stability: Unknown (5/7/2024)    Housing Stability     Current Living Arrangements: home     Potentially Unsafe Housing Conditions: Not on file   Utilities: Not on file   Health Literacy: Unknown (5/6/2024)    Education     Help with school or training?: Not on file     Preferred Language: Not on file   Employment: Unknown (5/6/2024)    Employment     Do you want help finding or keeping work or a job?: Not on file   Disabilities: Not At Risk (5/7/2024)    Disabilities     Concentrating, Remembering, or Making Decisions Difficulty: no     Doing Errands Independently Difficulty: no        Home Medications     Prior to Admission medications    Medication Sig Start Date End Date Taking? Authorizing Provider   ALPRAZolam (XANAX) 1 MG tablet Take 1 tablet by mouth 3 (Three) Times a Day As Needed for Anxiety.   Yes Memo Powers MD   amLODIPine (NORVASC) 5 MG tablet Take 1 tablet by mouth Daily.   Yes Memo Powers MD   FLUoxetine (PROzac) 10 MG capsule Take 1 capsule by mouth Daily.   Yes Memo Powers MD   FLUoxetine (PROzac) 20 MG capsule Take 1 capsule by mouth Daily.   Yes Memo Powers MD        Objective / Physical Exam     Vital signs:  Temp: 97.9 °F (36.6 °C)  BP: 119/72  Heart Rate: 80  Resp: 15  SpO2: 93 %  Weight: 97.9 kg (215 lb 13.3 oz)    Admission Weight: Weight: 97.9 kg (215 lb 13.3 oz)    Physical Exam  Constitutional:       Appearance: She is obese.   Eyes:      Pupils: Pupils are equal, round, and reactive to light.   Cardiovascular:      Rate and  Rhythm: Normal rate and regular rhythm.   Pulmonary:      Breath sounds: Rhonchi present.   Abdominal:      General: There is no distension.      Palpations: Abdomen is soft.   Musculoskeletal:      Right lower leg: Edema present.      Left lower leg: Edema present.   Skin:     General: Skin is warm and dry.   Neurological:      Mental Status: She is alert and oriented to person, place, and time.   Psychiatric:         Mood and Affect: Mood normal.         Behavior: Behavior normal.            Labs     5/6/2024 white blood cell 9.2, hemoglobin 19.4, hematocrit 59.4, platelet 124, sodium 139, potassium 3.4, chloride 98, carbon dioxide 34, anion gap of 7, BUN 15, creatinine 0.65, glucose 113, ALT 15, AST 11, bilirubin 2, albumin 3.2, high-sensitivity troponin 34, lactate 1.3    Imaging     See above assessment and plan.  Copy of CT PE protocol in chart    Current Medications     Scheduled Meds:  amLODIPine, 5 mg, Oral, Daily  budesonide-formoterol, 2 puff, Inhalation, BID - RT  famotidine, 40 mg, Oral, Daily  FLUoxetine, 10 mg, Oral, Daily  FLUoxetine, 20 mg, Oral, Daily  furosemide, 40 mg, Intravenous, Q12H  nicotine, 1 patch, Transdermal, Q24H  potassium chloride, 20 mEq, Oral, BID  sodium chloride, 10 mL, Intravenous, Q12H         Continuous Infusions:          Mariah Cheney, [facility]  05/07/24   04:13 EDT

## 2024-05-07 NOTE — CONSULTS
PULMONARY CRITICAL CARE CONSULT NOTE      PATIENT IDENTIFICATION:  Name: Jeanne King  MRN: [id number]  :  1960     Age: 64 y.o.  Sex: female        DATE OF CONSULTATION:  2024  PRIMARY CARE PHYSICIAN    Provider, No Known                  CHIEF COMPLAINT: Lung mass    History of Present Illness:   Jeanne King is a 64 y.o. female with history of COPD, HTN, Obesity, and Anxiety who initially went  to [facility].  Patient reports she has been feeling unwell for a long time.  She does not use oxygen at home.  Uses albuterol and Advair.  Does not like going to the doctor and very anxious and was not willing to seek medical help until her daughter persuaded her due to acute edema to bilateral lower extremities with signs of weeping. It is reported that she was initially on 6 L nasal cannula but then escalated to 30 L high flow with FiO2 of 60.  She was given 40 of Lasix with approximately 1 L of output.   COVID was negative, High-sensitivity troponin was 34 lactate 1.3 .  CT of the chest was negative for pulmonary emboli.  Concern for primary lung neoplasm with 2 lung lesions identified with mediastinal and hilar lymphadenopathy.  Cardiomegaly and coronary artery calcifications.  Cirrhotic enlarged liver splenomegaly and abdominal ascites.  She was transferred to [facility] and our service was asked to see.       Review of Systems:   Constitutional: As above   Eyes: negative   ENT/oropharynx: negative   Cardiovascular: negative   Respiratory: As above   Gastrointestinal: negative   Genitourinary: negative   Neurological: negative   Musculoskeletal: negative   Integument/breast: negative   Endocrine: negative   Allergic/Immunologic: negative     Past Medical History:  Past Medical History:   Diagnosis Date    COPD (chronic obstructive pulmonary disease)     Hypertension        Past Surgical History:  Past Surgical History:   Procedure Laterality Date    HYSTERECTOMY          Family  "History:  Family History   Problem Relation Age of Onset    No Known Problems Mother     No Known Problems Father     No Known Problems Sister     No Known Problems Brother         Social History:   Social History     Tobacco Use    Smoking status: Every Day     Current packs/day: 1.50     Average packs/day: 1.5 packs/day for 53.3 years (80.0 ttl pk-yrs)     Types: Cigarettes     Start date:     Smokeless tobacco: Never   Substance Use Topics    Alcohol use: Never        Allergies:  Allergies   Allergen Reactions    Keflex [Cephalexin] Mental Status Change and Provider Review Needed    Morphine Mental Status Change    Premarin [Conjugated Estrogens] Mental Status Change       Home Meds:  Medications Prior to Admission   Medication Sig Dispense Refill Last Dose    ALPRAZolam (XANAX) 1 MG tablet Take 1 tablet by mouth 3 (Three) Times a Day As Needed for Anxiety.   2024    amLODIPine (NORVASC) 5 MG tablet Take 1 tablet by mouth Daily.   2024    FLUoxetine (PROzac) 10 MG capsule Take 1 capsule by mouth Daily.   Past Week    FLUoxetine (PROzac) 20 MG capsule Take 1 capsule by mouth Daily.   Past Week    albuterol sulfate  (90 Base) MCG/ACT inhaler Inhale 2 puffs Every 4 (Four) Hours As Needed for Wheezing.       Fluticasone-Salmeterol (ADVAIR/WIXELA) 250-50 MCG/ACT DISKUS Inhale 2 (Two) Times a Day.          Objective:  tMax 24 hrs: Temp (24hrs), Av.9 °F (36.6 °C), Min:97.7 °F (36.5 °C), Max:98 °F (36.7 °C)      Vitals Ranges:   Temp:  [97.7 °F (36.5 °C)-98 °F (36.7 °C)] 97.7 °F (36.5 °C)  Heart Rate:  [71-82] 79  Resp:  [15-22] 20  BP: (115-122)/(64-72) 122/69    Intake and Output Last 3 Shifts:   I/O last 3 completed shifts:  In: 240 [P.O.:240]  Out: 800 [Urine:800]    Exam:  /69 (BP Location: Left arm, Patient Position: Lying)   Pulse 79   Temp 97.7 °F (36.5 °C) (Oral)   Resp 20   Ht 172.7 cm (68\")   Wt 97.5 kg (215 lb)   SpO2 91%   BMI 32.69 kg/m²       245 " 05/07/24  1121   Weight: 97.9 kg (215 lb 13.3 oz) 97.5 kg (215 lb)     General Appearance:      HEENT:  Normocephalic, without obvious abnormality, atraumatic. Conjunctivae/corneas clear.  Normal external ear canals. Nares normal, no drainage.  Neck:  Supple, symmetrical, trachea midline. No JVD.  Lungs /Chest wall:   Bilateral basal rhonchi, respirations unlabored, symmetrical wall movement.     Heart:  Regular rate and rhythm, systolic murmur PMI left sternal border  Abdomen: Soft, nontender, no masses, no organomegaly.    Extremities: Trace edema, no clubbing or cyanosis        Data Review:  All labs (24hrs):   Recent Results (from the past 24 hour(s))   POC Glucose Once    Collection Time: 05/07/24  1:39 AM    Specimen: Blood   Result Value Ref Range    Glucose 114 (H) 70 - 105 mg/dL   Comprehensive Metabolic Panel    Collection Time: 05/07/24  4:19 AM    Specimen: Blood   Result Value Ref Range    Glucose 102 (H) 65 - 99 mg/dL    BUN 12 8 - 23 mg/dL    Creatinine 0.59 0.57 - 1.00 mg/dL    Sodium 140 136 - 145 mmol/L    Potassium 3.8 3.5 - 5.2 mmol/L    Chloride 99 98 - 107 mmol/L    CO2 31.0 (H) 22.0 - 29.0 mmol/L    Calcium 8.3 (L) 8.6 - 10.5 mg/dL    Total Protein 5.8 (L) 6.0 - 8.5 g/dL    Albumin 3.0 (L) 3.5 - 5.2 g/dL    ALT (SGPT) 16 1 - 33 U/L    AST (SGOT) 19 1 - 32 U/L    Alkaline Phosphatase 85 39 - 117 U/L    Total Bilirubin 1.6 (H) 0.0 - 1.2 mg/dL    Globulin 2.8 gm/dL    A/G Ratio 1.1 g/dL    BUN/Creatinine Ratio 20.3 7.0 - 25.0    Anion Gap 10.0 5.0 - 15.0 mmol/L    eGFR 100.2 >60.0 mL/min/1.73   CBC Auto Differential    Collection Time: 05/07/24  4:19 AM    Specimen: Blood   Result Value Ref Range    WBC 8.90 3.40 - 10.80 10*3/mm3    RBC 6.33 (H) 3.77 - 5.28 10*6/mm3    Hemoglobin 18.4 (H) 12.0 - 15.9 g/dL    Hematocrit 59.9 (H) 34.0 - 46.6 %    MCV 94.6 79.0 - 97.0 fL    MCH 29.1 26.6 - 33.0 pg    MCHC 30.7 (L) 31.5 - 35.7 g/dL    RDW 17.4 (H) 12.3 - 15.4 %    RDW-SD 56.5 (H) 37.0 - 54.0 fl     MPV 10.2 6.0 - 12.0 fL    Platelets 108 (L) 140 - 450 10*3/mm3    Neutrophil % 79.7 (H) 42.7 - 76.0 %    Lymphocyte % 12.9 (L) 19.6 - 45.3 %    Monocyte % 6.7 5.0 - 12.0 %    Eosinophil % 0.3 0.3 - 6.2 %    Basophil % 0.2 0.0 - 1.5 %    Immature Grans % 0.2 0.0 - 0.5 %    Neutrophils, Absolute 7.08 (H) 1.70 - 7.00 10*3/mm3    Lymphocytes, Absolute 1.15 0.70 - 3.10 10*3/mm3    Monocytes, Absolute 0.60 0.10 - 0.90 10*3/mm3    Eosinophils, Absolute 0.03 0.00 - 0.40 10*3/mm3    Basophils, Absolute 0.02 0.00 - 0.20 10*3/mm3    Immature Grans, Absolute 0.02 0.00 - 0.05 10*3/mm3    nRBC 0.0 0.0 - 0.2 /100 WBC   Hemoglobin A1c    Collection Time: 05/07/24  4:19 AM    Specimen: Blood   Result Value Ref Range    Hemoglobin A1C 5.90 (H) 4.80 - 5.60 %   Lipid Panel    Collection Time: 05/07/24  4:19 AM    Specimen: Blood   Result Value Ref Range    Total Cholesterol 81 0 - 200 mg/dL    Triglycerides 60 0 - 150 mg/dL    HDL Cholesterol 34 (L) 40 - 60 mg/dL    LDL Cholesterol  33 0 - 100 mg/dL    VLDL Cholesterol 14 5 - 40 mg/dL    LDL/HDL Ratio 1.03    TSH Rfx On Abnormal To Free T4    Collection Time: 05/07/24  4:19 AM    Specimen: Blood   Result Value Ref Range    TSH 0.611 0.270 - 4.200 uIU/mL   BNP    Collection Time: 05/07/24  4:19 AM    Specimen: Blood   Result Value Ref Range    proBNP 1,213.0 (H) 0.0 - 900.0 pg/mL   Scan Slide    Collection Time: 05/07/24  4:19 AM    Specimen: Blood   Result Value Ref Range    RBC Morphology Normal Normal    WBC Morphology Normal Normal    Platelet Estimate Decreased Normal   Blood Gas, Arterial -    Collection Time: 05/07/24  8:10 AM    Specimen: Arterial Blood   Result Value Ref Range    Site Left Brachial     Karan's Test N/A     pH, Arterial 7.305 (L) 7.350 - 7.450 pH units    pCO2, Arterial 81.3 (C) 35.0 - 48.0 mm Hg    pO2, Arterial 75.8 (L) 83.0 - 108.0 mm Hg    HCO3, Arterial 40.5 (H) 21.0 - 28.0 mmol/L    Base Excess, Arterial 8.3 (H) 0.0 - 3.0 mmol/L    O2 Saturation, Arterial  "92.4 (L) 94.0 - 98.0 %    CO2 Content 43.0 (H) 22 - 29 mmol/L    Barometric Pressure for Blood Gas      Modality Cannula     FIO2 36 %    Hemodilution No         Imaging:  US Liver    Result Date: 5/7/2024  DATE OF EXAM: 5/7/2024 4:35 AM  PROCEDURE: US LIVER-  INDICATIONS: ascites, eval for cirrhosis  COMPARISON: No Comparisons Available  TECHNIQUE: Grayscale and color Doppler ultrasound evaluation of the limited abdomen was performed.  FINDINGS: The liver is enlarged, 20.0 cm in length. The liver echotexture is coarsened. There is a subtle nodular surface contour of the liver suggestive of cirrhosis. No focal liver lesion is identified. There is a \"starry eric\" pattern of the liver parenchyma, which may also reflect changes of chronic liver disease. No ascites is evident. Main portal vein is patent with hepatopetal flow. The imaged hepatic veins are patent. Common bile duct caliber is within normal limits, 6 mm. No intrahepatic biliary ductal dilation is observed. The intrahepatic IVC demonstrates color flow.      1. Hepatomegaly. 2. Subtle nodular surface contour of the liver with mildly coarsened echotexture, raising the possibility of cirrhosis. 3. No focal liver lesion is seen. 4. No ascites is seen.  Electronically Signed By-Jane Woodruff MD On:5/7/2024 7:33 AM         Current:  amLODIPine, 5 mg, Oral, Daily  budesonide, 0.5 mg, Nebulization, BID - RT  famotidine, 40 mg, Oral, Daily  FLUoxetine, 10 mg, Oral, Daily  FLUoxetine, 20 mg, Oral, Daily  furosemide, 40 mg, Intravenous, Q12H  ipratropium-albuterol, 3 mL, Nebulization, 4x Daily - RT  methylPREDNISolone sodium succinate, 40 mg, Intravenous, Q8H  nicotine, 1 patch, Transdermal, Q24H  potassium chloride, 20 mEq, Oral, BID With Meals  sodium chloride, 10 mL, Intravenous, Q12H        Infusion:        PRN:    ALPRAZolam    senna-docusate sodium **AND** polyethylene glycol **AND** bisacodyl **AND** bisacodyl    ipratropium-albuterol    nitroglycerin    " ondansetron    sodium chloride    sodium chloride    ASSESSMENT:  Acute hypoxic respiratory failure   AE COPD  Hx COVID-19   RLL spiculated mass lesion    HTN  Tobacco abuse  Obesity  Anxiety    PLAN:  Spoke with daughter and she is to bring CD of the CT chest   Will need workup for masses  Bronchodilators  Inhaled corticosteroids  IV steroids   Incentive spirometer  Nicotine patch   Electrolytes/ glycemic control  No DVT prophylaxis    Discussed with Dr Shayla Guadalupe, APRN  5/7/2024  11:56 EDT      I personally have examined  and interviewed the patient. I have reviewed the history, data, problems, assessment and plan with our NP.  Total Critical care time in direct medical management (   ) minutes, This time specifically excludes time spent performing procedures.    Bessy Mcguire MD   5/7/2024  21:54 EDT

## 2024-05-07 NOTE — CASE MANAGEMENT/SOCIAL WORK
Continued Stay Note  SAAD Garcíayd     Patient Name: Jeanne King  MRN: [id number]  Today's Date: 5/7/2024    Admit Date: 5/7/2024    Plan: Home. Watch for O2 needs.  [facility] cab for tranport ([phone])   Discharge Plan       Row Name 05/07/24 1716       Plan    Plan Home. Watch for O2 needs.  [facility] cab for tranport ([phone])    Patient/Family in Agreement with Plan yes    Plan Comments Will need CM assessment.  Per chart review lives home, no home O2.  Barriers to discharge: 4L O2.  IV lasix, IV steroids changed to oral.  Echo.  Cardio consult.  Pulm and hematology following.  CT chest done @  [facility].             Expected Discharge Date and Time       Expected Discharge Date Expected Discharge Time    May 9, 2024               Yolanda Medina RN     Office Phone [phone]  Office Cell [phone]

## 2024-05-07 NOTE — PROGRESS NOTES
Patient was admitted by another colleague after midnight.  History revised; vital signs, labs, imaging as well as management reviewed    Patient is 64-year-old with past medical history significant for essential hypertension, COPD, anxiety with with no follow-up with PCP or any healthcare provider presented with bilateral lower extremity as well as not feeling well for more than a week duration.  Patient has been reluctant to follow-up with healthcare provider; she was told that her saturation was 76 by EMS and was altered in mentation.  Initially at you will probably was worked up required high flow oxygen as well as BiPAP.  Further workup showed elevated hemoglobin of 19.4 with elevated lactate.  CT of the chest was unremarkable for pulmonary emboli but was concerning with 2 lung lesions as well as mediastinal and hilar lymphadenopathy concerning for neoplasm.  Also noted to have cirrhotic enlarged liver with splenomegaly and ascites      Assessment and plan  #Acute hypoxemic respiratory failure  #Acute exacerbation of likely diastolic heart failure  #Acute pulmonary edema  #Malignant lung neoplasm as well as  Lymphadenopathy  #Bilateral lower extremity edema likely from above  -Continue to supplement oxygen to keep saturation above 90 to 92%  - CT of the chest report as below  1. No pulmonary emboli 2. Bilateral rond glass patchy opacities nonspecific suggestion of pulmonary edema.  A right lower lobe 2.3 cm spiculated mass lesion, another more distal lesion 1.7 cm.  These lesions are highly concerning for primary lung neoplasm and recommend biopsy or PET/CT exam.  Lingular atelectasis.  No pneumothorax or pleural effusion.  Mediastinal and hilar lymphadenopathy.  #3 cardiomegaly and coronary artery calcifications.  #4 cirrhotic enlarged liver.  Splenomegaly.  Abdominal ascites.  - afebrile. Negative for cough or fever. Negative leukocytosis. Does not appear to have pneumonia    Continue IV Lasix 40 mg twice  daily  - Transthoracic echocardiogram pending  Will consult cardiology based on echocardiogram findings    Liver cirrhosis    -Ultrasound noted for subtotal nodular surface contour of the liver with mildly coarsened echotexture raising the possibility of cirrhosis and no focal as well as ascites seen  -HCC  Screening    #Erythrocytosis  -Hematocrit 59.9  - Serum EPO level    Essential hypertension  - Continue to monitor  - Continue Norvasc    #History of anxiety-continue Xanax and Protonix    #COPD  -Inhalers bronchodilators and IV steroids    Obesity BMI 32.82  - Lifestyle and dietary management

## 2024-05-07 NOTE — CONSULTS
[facility]        Subjective:     Encounter Date:05/06/2024      Patient ID: Jeanne King is a 64 y.o. female.    Chief Complaint: shortness of breath, lower extremity edema    Referring Physician: Bon Dunn MD   Cardiology consult: heart failure    HPI:  Jeanne King is a 64 y.o. female who presented initially to [facility] with shortness of breath and lower extremity edema. Ms. King does not routinely see a cardiologist. Pmh includes HTN, chronic smoker, COPD.     Ms. King has had worsening shortness of breath and edema in bilateral lower extremities. She has not felt well for a long time but has been hesitant to go to the doctor. She has not seen a physician in a number of years. She is on amlodipine at home for blood pressure. She denies chest pain. She denies Etoh or illicit drug use.     Work up included  pH 7.53, pCO2 35, pO2 101, bicarb 29 and O2 sat 98.  COVID was negative negative for leukocytosis.  Hemoglobin 19.4 and platelet 125.  Other labs reviewed potassium 3.4.  High-sensitivity troponin was 34 lactate 1.3 .  CT of the chest was negative for pulmonary emboli.  Concern for primary lung neoplasm with 2 lung lesions identified with mediastinal and hilar lymphadenopathy.  Cardiomegaly and coronary artery calcifications.  Cirrhotic enlarged liver splenomegaly and abdominal ascites. She was initially placed on Bipap but is on room air now.       Past Medical History:   Diagnosis Date    COPD (chronic obstructive pulmonary disease)     Hypertension        Past Surgical History:   Procedure Laterality Date    HYSTERECTOMY         Family History   Problem Relation Age of Onset    No Known Problems Mother     No Known Problems Father     No Known Problems Sister     No Known Problems Brother        Social History     Socioeconomic History    Marital status:    Tobacco Use    Smoking status: Every Day     Current packs/day: 1.50     Average packs/day: 1.5 packs/day for  "53.3 years (80.0 ttl pk-yrs)     Types: Cigarettes     Start date: 1971    Smokeless tobacco: Never   Vaping Use    Vaping status: Never Used   Substance and Sexual Activity    Alcohol use: Never    Drug use: Never         Allergies   Allergen Reactions    Keflex [Cephalexin] Mental Status Change and Provider Review Needed    Morphine Mental Status Change    Premarin [Conjugated Estrogens] Mental Status Change       Current Medications:   Scheduled Meds:budesonide, 0.5 mg, Nebulization, BID - RT  FLUoxetine, 10 mg, Oral, Daily  FLUoxetine, 20 mg, Oral, Daily  furosemide, 40 mg, Intravenous, Q12H  ipratropium-albuterol, 3 mL, Nebulization, 4x Daily - RT  methylPREDNISolone sodium succinate, 40 mg, Intravenous, Q12H  metoprolol succinate XL, 25 mg, Oral, Q12H  nicotine, 1 patch, Transdermal, Q24H  potassium chloride, 20 mEq, Oral, BID With Meals  sodium chloride, 10 mL, Intravenous, Q12H      Continuous Infusions:     Review of Systems   Constitutional: Negative for chills, diaphoresis and malaise/fatigue.   Cardiovascular:  Positive for dyspnea on exertion and leg swelling. Negative for chest pain, irregular heartbeat, near-syncope, orthopnea, palpitations, paroxysmal nocturnal dyspnea and syncope.   Respiratory:  Positive for shortness of breath. Negative for cough, sleep disturbances due to breathing and sputum production.    Gastrointestinal:  Negative for change in bowel habit.   Genitourinary:  Negative for urgency.   Neurological:  Negative for dizziness and headaches.   Psychiatric/Behavioral:  Negative for altered mental status.             Objective:         /71 (BP Location: Left arm, Patient Position: Lying)   Pulse 97   Temp 97.9 °F (36.6 °C) (Oral)   Resp 18   Ht 172.7 cm (68\")   Wt 97.5 kg (215 lb)   SpO2 91%   BMI 32.69 kg/m²     Physical Exam:  General Appearance:    Alert, cooperative, in no acute distress                                Head: Atraumatic, normocephalic, PERRLA             "   Neck:   supple,  +JVD   Lungs:     Clear to auscultation,respirations regular, even and               unlabored    Heart:    Regular rhythm and normal rate, normal S1 and S2   Abdomen:     Normal bowel sounds, no masses, no organomegaly, soft  nontender, nondistended, no guarding, no rebound  tenderness   Extremities:   Moves all extremities well, + edema, no cyanosis, no  redness   Pulses:   Pulses palpable and equal bilaterally   Skin:   No bleeding, bruising or rash   Neurologic:   Awake, alert, oriented x3                 ASCVD Risk Score::  The ASCVD Risk score (Bari DK, et al., 2019) failed to calculate for the following reasons:    The valid total cholesterol range is 130 to 320 mg/dL      Lab Review:     Results from last 7 days   Lab Units 05/07/24  0419   SODIUM mmol/L 140   POTASSIUM mmol/L 3.8   CHLORIDE mmol/L 99   CO2 mmol/L 31.0*   BUN mg/dL 12   CREATININE mg/dL 0.59   GLUCOSE mg/dL 102*   CALCIUM mg/dL 8.3*   AST (SGOT) U/L 19   ALT (SGPT) U/L 16         Results from last 7 days   Lab Units 05/07/24  0419   WBC 10*3/mm3 8.90   HEMOGLOBIN g/dL 18.4*   HEMATOCRIT % 59.9*   PLATELETS 10*3/mm3 108*             Results from last 7 days   Lab Units 05/07/24  0419   CHOLESTEROL mg/dL 81   TRIGLYCERIDES mg/dL 60   HDL CHOL mg/dL 34*     Results from last 7 days   Lab Units 05/07/24  0419   PROBNP pg/mL 1,213.0*     Results from last 7 days   Lab Units 05/07/24  0419   TSH uIU/mL 0.611       Recent Radiology:  Imaging Results (Most Recent)       Procedure Component Value Units Date/Time    US Liver [[id number]] Collected: 05/07/24 0731     Updated: 05/07/24 0735    Narrative:      DATE OF EXAM:  5/7/2024 4:35 AM     PROCEDURE:  US LIVER-     INDICATIONS:  ascites, eval for cirrhosis     COMPARISON:  No Comparisons Available     TECHNIQUE:   Grayscale and color Doppler ultrasound evaluation of the limited abdomen  was performed.     FINDINGS:  The liver is enlarged, 20.0 cm in length. The liver echotexture  "is  coarsened. There is a subtle nodular surface contour of the liver  suggestive of cirrhosis. No focal liver lesion is identified. There is a  \"starry eric\" pattern of the liver parenchyma, which may also reflect  changes of chronic liver disease. No ascites is evident. Main portal  vein is patent with hepatopetal flow. The imaged hepatic veins are  patent. Common bile duct caliber is within normal limits, 6 mm. No  intrahepatic biliary ductal dilation is observed. The intrahepatic IVC  demonstrates color flow.       Impression:      1. Hepatomegaly.  2. Subtle nodular surface contour of the liver with mildly coarsened  echotexture, raising the possibility of cirrhosis.  3. No focal liver lesion is seen.  4. No ascites is seen.     Electronically Signed By-Jane Woodruff MD On:5/7/2024 7:33 AM                 ECHOCARDIOGRAM:    Results for orders placed during the hospital encounter of 05/07/24    Adult Transthoracic Echo Complete W/ Cont if Necessary Per Protocol    Interpretation Summary    Left ventricular systolic function is normal. Left ventricular ejection fraction appears to be 56 - 60%.    Left ventricular diastolic function is consistent with (grade I) impaired relaxation.    Mildly reduced right ventricular systolic function noted.    The right ventricular cavity is moderately dilated.    Estimated right ventricular systolic pressure from tricuspid regurgitation is normal (<35 mmHg).    IVC is dilated            Assessment:         Active Hospital Problems    Diagnosis  POA    **Acute hypoxic respiratory failure [J96.01]  Yes     Acute hypoxic and hypercapnic respiratory insufficiency  Volume overload / HFpEF / RV dysfunction  HTN  Tobacco abuse  Abnormal CT at OSH with Concern for primary lung neoplasm with 2 lung lesions identified with mediastinal and hilar lymphadenopathy and cirrhotic changes of liver  Polycythemia     Plan:   Patient admitted with shortness of breath, lower extremity edema, long " term smoker, has not seen physician in a number of years, CT at [facility] with concern for lung neoplasm and hilar lymphadenopathy  She is overloaded, continue IV diuresis  Stress testing ordered  2D ECHO shows normal LVEF, RV dysfunction, dilated IVC  Recommend oncology consult / pulm consult/ GI consult  Additional recommendations pending above consults/ testing    Patient is seen and examined and findings are verified.  All data is reviewed by me personally.  Assessment and plan formulated by APC was done after discussion with attending.  I spent more than 50% of time in taking care of the patient.    Patient is presented with shortness of breath and was noted to have a hypercapnic respiratory failure.  Patient had bilateral leg edema.  Cardiology consultation is requested for further recommendation for congestive heart failure:    Patient does not see physicians/PCP very often.  She carries the diagnosis of hypertension.  She denies previous history of CAD, PCI or MI.  She denies any active chest pain.  She has bilateral leg edema    Normal S1 and S2.  No pericardial rub or murmur JVD is difficult to assess but decreased breath sound at the bases bilateral leg edema noted    MDM:    1.  Shortness of breath:    Most likely cause of shortness of breath is COPD as well as congestive heart failure.  Her BNP is elevated I agree with gentle diuresis.  Echocardiogram showed preserved left ventricular function and no significant valvular heart disease noted.  She will need ischemic evaluation proceed with stress test tomorrow    2.  Acute diastolic congestive heart failure:    At this stage I would recommend to discontinue amlodipine and start Toprol-XL 25 mg twice daily.  Continue diuresis.  Salt and water restriction recommended    3.  Bilateral leg edema:    Patient appears to have right heart failure probably due to underlying obstructive sleep apnea.  I would recommend diuretics.    4.  Obstructive sleep apnea:    I  will request pulmonary consultation for sleep apnea.  I believe patient has severe sleep apnea her hematocrit is elevated and RV is dilated.    Electronically signed by Josh Mendez MD, 05/07/24, 5:41 PM EDT.                   Josh Mendez MD  05/07/24  17:41 EDT

## 2024-05-08 ENCOUNTER — APPOINTMENT (OUTPATIENT)
Dept: OTHER | Facility: HOSPITAL | Age: 64
DRG: 189 | End: 2024-05-08
Payer: MEDICARE

## 2024-05-08 LAB
ANION GAP SERPL CALCULATED.3IONS-SCNC: 8 MMOL/L (ref 5–15)
BUN SERPL-MCNC: 17 MG/DL (ref 8–23)
BUN/CREAT SERPL: 24.3 (ref 7–25)
CALCIUM SPEC-SCNC: 8.5 MG/DL (ref 8.6–10.5)
CHLORIDE SERPL-SCNC: 98 MMOL/L (ref 98–107)
CO2 SERPL-SCNC: 36 MMOL/L (ref 22–29)
CREAT SERPL-MCNC: 0.7 MG/DL (ref 0.57–1)
EGFRCR SERPLBLD CKD-EPI 2021: 96.1 ML/MIN/1.73
FOLATE SERPL-MCNC: 4.27 NG/ML (ref 4.78–24.2)
GEN 5 2HR TROPONIN T REFLEX: 22 NG/L
GLUCOSE SERPL-MCNC: 148 MG/DL (ref 65–99)
JAK2 V617F RESULT: NORMAL
MAGNESIUM SERPL-MCNC: 1.7 MG/DL (ref 1.6–2.4)
PHOSPHATE SERPL-MCNC: 5.2 MG/DL (ref 2.5–4.5)
POTASSIUM SERPL-SCNC: 4.4 MMOL/L (ref 3.5–5.2)
SODIUM SERPL-SCNC: 142 MMOL/L (ref 136–145)
TROPONIN T DELTA: 3 NG/L
TROPONIN T SERPL HS-MCNC: 19 NG/L
VIT B12 BLD-MCNC: 625 PG/ML (ref 211–946)

## 2024-05-08 PROCEDURE — 84484 ASSAY OF TROPONIN QUANT: CPT | Performed by: INTERNAL MEDICINE

## 2024-05-08 PROCEDURE — 94664 DEMO&/EVAL PT USE INHALER: CPT

## 2024-05-08 PROCEDURE — 80048 BASIC METABOLIC PNL TOTAL CA: CPT | Performed by: STUDENT IN AN ORGANIZED HEALTH CARE EDUCATION/TRAINING PROGRAM

## 2024-05-08 PROCEDURE — 82668 ASSAY OF ERYTHROPOIETIN: CPT | Performed by: STUDENT IN AN ORGANIZED HEALTH CARE EDUCATION/TRAINING PROGRAM

## 2024-05-08 PROCEDURE — 25010000002 METHYLPREDNISOLONE PER 40 MG: Performed by: STUDENT IN AN ORGANIZED HEALTH CARE EDUCATION/TRAINING PROGRAM

## 2024-05-08 PROCEDURE — 94799 UNLISTED PULMONARY SVC/PX: CPT

## 2024-05-08 PROCEDURE — 94761 N-INVAS EAR/PLS OXIMETRY MLT: CPT

## 2024-05-08 PROCEDURE — 84100 ASSAY OF PHOSPHORUS: CPT | Performed by: STUDENT IN AN ORGANIZED HEALTH CARE EDUCATION/TRAINING PROGRAM

## 2024-05-08 PROCEDURE — 25010000002 FUROSEMIDE PER 20 MG: Performed by: NURSE PRACTITIONER

## 2024-05-08 PROCEDURE — 83735 ASSAY OF MAGNESIUM: CPT | Performed by: STUDENT IN AN ORGANIZED HEALTH CARE EDUCATION/TRAINING PROGRAM

## 2024-05-08 RX ADMIN — POTASSIUM CHLORIDE 20 MEQ: 1500 TABLET, EXTENDED RELEASE ORAL at 09:06

## 2024-05-08 RX ADMIN — METHYLPREDNISOLONE SODIUM SUCCINATE 40 MG: 40 INJECTION, POWDER, FOR SOLUTION INTRAMUSCULAR; INTRAVENOUS at 09:08

## 2024-05-08 RX ADMIN — ALPRAZOLAM 1 MG: 1 TABLET ORAL at 15:45

## 2024-05-08 RX ADMIN — FUROSEMIDE 40 MG: 10 INJECTION, SOLUTION INTRAMUSCULAR; INTRAVENOUS at 03:35

## 2024-05-08 RX ADMIN — ALPRAZOLAM 1 MG: 1 TABLET ORAL at 09:08

## 2024-05-08 RX ADMIN — Medication 10 ML: at 21:22

## 2024-05-08 RX ADMIN — IPRATROPIUM BROMIDE AND ALBUTEROL SULFATE 3 ML: .5; 3 SOLUTION RESPIRATORY (INHALATION) at 16:11

## 2024-05-08 RX ADMIN — FLUOXETINE 10 MG: 10 CAPSULE ORAL at 09:07

## 2024-05-08 RX ADMIN — FUROSEMIDE 40 MG: 10 INJECTION, SOLUTION INTRAMUSCULAR; INTRAVENOUS at 15:45

## 2024-05-08 RX ADMIN — IPRATROPIUM BROMIDE AND ALBUTEROL SULFATE 3 ML: .5; 3 SOLUTION RESPIRATORY (INHALATION) at 18:15

## 2024-05-08 RX ADMIN — IPRATROPIUM BROMIDE AND ALBUTEROL SULFATE 3 ML: .5; 3 SOLUTION RESPIRATORY (INHALATION) at 07:10

## 2024-05-08 RX ADMIN — IPRATROPIUM BROMIDE AND ALBUTEROL SULFATE 3 ML: .5; 3 SOLUTION RESPIRATORY (INHALATION) at 10:45

## 2024-05-08 RX ADMIN — BUDESONIDE 0.5 MG: 0.5 INHALANT RESPIRATORY (INHALATION) at 18:19

## 2024-05-08 RX ADMIN — FLUOXETINE 20 MG: 20 CAPSULE ORAL at 09:07

## 2024-05-08 RX ADMIN — ALPRAZOLAM 1 MG: 1 TABLET ORAL at 21:22

## 2024-05-08 RX ADMIN — METHYLPREDNISOLONE SODIUM SUCCINATE 40 MG: 40 INJECTION, POWDER, FOR SOLUTION INTRAMUSCULAR; INTRAVENOUS at 21:22

## 2024-05-08 RX ADMIN — Medication 10 ML: at 09:08

## 2024-05-08 RX ADMIN — BUDESONIDE 0.5 MG: 0.5 INHALANT RESPIRATORY (INHALATION) at 07:14

## 2024-05-08 RX ADMIN — METOPROLOL SUCCINATE 25 MG: 25 TABLET, EXTENDED RELEASE ORAL at 09:08

## 2024-05-08 RX ADMIN — METOPROLOL SUCCINATE 25 MG: 25 TABLET, EXTENDED RELEASE ORAL at 21:22

## 2024-05-08 RX ADMIN — POTASSIUM CHLORIDE 20 MEQ: 1500 TABLET, EXTENDED RELEASE ORAL at 17:16

## 2024-05-08 NOTE — PLAN OF CARE
Goal Outcome Evaluation: Pt pleasant and cooperative throughout day. No complaints on shift. Stress test postponed due to pts high O2 demand. Currently on 6L NC at 90%. VSS. Continuing to try to wean O2 down. Care ongoing.

## 2024-05-08 NOTE — PROGRESS NOTES
[facility] MEDICINE SERVICE  DAILY PROGRESS NOTE    NAME: Jeanne King  : 1960  MRN: [id number]      LOS: 1 day     PROVIDER OF SERVICE: Bon Dunn MD    Chief Complaint: Acute hypoxic respiratory failure    Subjective:     Interval History:  History taken from: patient  Patient Complaints: Feeling better than previously but still short of breath with bilateral leg swelling  Patient Denies: Nausea or vomiting    Review of Systems:   Review of Systems  14 point review of system unremarkable except mentioned above  Objective:     Vital Signs  Temp:  [97.8 °F (36.6 °C)-98.4 °F (36.9 °C)] 97.8 °F (36.6 °C)  Heart Rate:  [59-98] 62  Resp:  [12-20] 18  BP: (106-124)/(58-74) 107/62  Flow (L/min):  [4-6] 6   Body mass index is 32.82 kg/m².    Physical Exam  Physical Exam  HENT:      Head: Atraumatic.      Mouth/Throat:      Mouth: Mucous membranes are moist.   Eyes:      Pupils: Pupils are equal, round, and reactive to light.   Cardiovascular:      Rate and Rhythm: Normal rate and regular rhythm.      Pulses: Normal pulses.      Heart sounds: Normal heart sounds.   Pulmonary:      Breath sounds: Rales present.   Abdominal:      General: Bowel sounds are normal.      Palpations: Abdomen is soft.   Musculoskeletal:      Right lower leg: Edema present.      Left lower leg: Edema present.   Skin:     General: Skin is warm.   Neurological:      General: No focal deficit present.      Mental Status: She is alert and oriented to person, place, and time.   Psychiatric:         Mood and Affect: Mood normal.         Scheduled Meds   ALPRAZolam, 1 mg, Oral, TID  budesonide, 0.5 mg, Nebulization, BID - RT  FLUoxetine, 10 mg, Oral, Daily  FLUoxetine, 20 mg, Oral, Daily  furosemide, 40 mg, Intravenous, Q12H  ipratropium-albuterol, 3 mL, Nebulization, 4x Daily - RT  methylPREDNISolone sodium succinate, 40 mg, Intravenous, Q12H  metoprolol succinate XL, 25 mg, Oral, Q12H  nicotine, 1 patch, Transdermal,  Q24H  potassium chloride, 20 mEq, Oral, BID With Meals  sodium chloride, 10 mL, Intravenous, Q12H       PRN Meds     acetaminophen    senna-docusate sodium **AND** polyethylene glycol **AND** bisacodyl **AND** bisacodyl    ipratropium-albuterol    nitroglycerin    ondansetron    sodium chloride    sodium chloride   Infusions         Diagnostic Data    Results from last 7 days   Lab Units 05/08/24  0618 05/07/24  0419   WBC 10*3/mm3  --  8.90   HEMOGLOBIN g/dL  --  18.4*   HEMATOCRIT %  --  59.9*   PLATELETS 10*3/mm3  --  108*   GLUCOSE mg/dL 148* 102*   CREATININE mg/dL 0.70 0.59   BUN mg/dL 17 12   SODIUM mmol/L 142 140   POTASSIUM mmol/L 4.4 3.8   AST (SGOT) U/L  --  19   ALT (SGPT) U/L  --  16   ALK PHOS U/L  --  85   BILIRUBIN mg/dL  --  1.6*   ANION GAP mmol/L 8.0 10.0       US Liver    Result Date: 5/7/2024  1. Hepatomegaly. 2. Subtle nodular surface contour of the liver with mildly coarsened echotexture, raising the possibility of cirrhosis. 3. No focal liver lesion is seen. 4. No ascites is seen.  Electronically Signed By-Jane Woodruff MD On:5/7/2024 7:33 AM         I reviewed the patient's new clinical results.    Assessment/Plan:     Active and Resolved Problems  #Acute hypoxemic respiratory failure  #Acute exacerbation of likely diastolic heart failure  #Acute pulmonary edema  #Malignant lung neoplasm as well as  Lymphadenopathy  #Bilateral lower extremity edema likely from above  -Continue to supplement oxygen to keep saturation above 90 to 92%  - CT of the chest report as below  1. No pulmonary emboli 2. Bilateral rond glass patchy opacities nonspecific suggestion of pulmonary edema.  A right lower lobe 2.3 cm spiculated mass lesion, another more distal lesion 1.7 cm.  These lesions are highly concerning for primary lung neoplasm and recommend biopsy or PET/CT exam.  Lingular atelectasis.  No pneumothorax or pleural effusion.  Mediastinal and hilar lymphadenopathy.  #3 cardiomegaly and coronary artery  calcifications.  #4 cirrhotic enlarged liver.  Splenomegaly.  Abdominal ascites.  - afebrile. Negative for cough or fever. Negative leukocytosis. Does not appear to have pneumonia     Continue IV Lasix 40 mg twice daily  - Transthoracic echocardiogram  noted   Will consult cardiology based on echocardiogram findings     Liver cirrhosis     -Ultrasound noted for subtotal nodular surface contour of the liver with mildly coarsened echotexture raising the possibility of cirrhosis and no focal as well as ascites seen  -HCC  Screening     #Erythrocytosis  -Hematocrit 59.9  - Serum EPO level     Essential hypertension  - Continue to monitor  - Continue Norvasc     #History of anxiety-continue Xanax and Protonix     #COPD  -Inhalers bronchodilators and IV steroids     Obesity BMI 32.82  - Lifestyle and dietary management        DVT prophylaxis:  Mechanical DVT prophylaxis orders are present.         Code status is   Code Status and Medical Interventions:   Ordered at: 05/07/24 0243     Level Of Support Discussed With:    Patient     Code Status (Patient has no pulse and is not breathing):    CPR (Attempt to Resuscitate)     Medical Interventions (Patient has pulse or is breathing):    Full Support       Plan for disposition: home  in 2-3  days, currently requires diuresis    Time: 35 minutes    Signature: Electronically signed by Bon Dunn MD, 05/08/24, 14:40 EDT.  [facility]

## 2024-05-08 NOTE — PROGRESS NOTES
"PULMONARY CRITICAL CARE PROGRESS  NOTE      PATIENT IDENTIFICATION:  Name: Jeanne King  MRN: [id number]  :  1960     Age: 64 y.o.  Sex: female    DATE OF Note:  2024   Referring Physician: Tiarra Renee MD                  Subjective:   Feeling better, no SOB, no chest or abd pain, no bowel or bladder issues       Objective:  tMax 24 hrs: Temp (24hrs), Av.1 °F (36.7 °C), Min:97.8 °F (36.6 °C), Max:98.4 °F (36.9 °C)      Vitals Ranges:   Temp:  [97.8 °F (36.6 °C)-98.4 °F (36.9 °C)] 97.8 °F (36.6 °C)  Heart Rate:  [59-98] 70  Resp:  [12-20] 12  BP: (106-124)/(58-80) 123/74    Intake and Output Last 3 Shifts:   I/O last 3 completed shifts:  In: 1680 [P.O.:1680]  Out: 3100 [Urine:3100]    Exam:  /74 (BP Location: Left arm, Patient Position: Lying)   Pulse 70   Temp 97.8 °F (36.6 °C) (Oral)   Resp 12   Ht 172.7 cm (68\")   Wt 97.9 kg (215 lb 13.3 oz)   SpO2 93%   BMI 32.82 kg/m²     General Appearance:  AAO   HEENT:  Normocephalic, without obvious abnormality. Conjunctivae/corneas clear.  Normal external ear canals. Nares normal, no drainage     Neck:  Supple, symmetrical, trachea midline. No JVD.  Lungs /Chest wall:   Bilateral basal rhonchi, respirations unlabored, symmetrical wall movement.     Heart:  Regular rate and rhythm, systolic murmur PMI left sternal border  Abdomen: Soft, nontender, no masses, no organomegaly.    Extremities: Trace edema, no clubbing or cyanosis        Medications:  ALPRAZolam, 1 mg, Oral, TID  budesonide, 0.5 mg, Nebulization, BID - RT  FLUoxetine, 10 mg, Oral, Daily  FLUoxetine, 20 mg, Oral, Daily  furosemide, 40 mg, Intravenous, Q12H  ipratropium-albuterol, 3 mL, Nebulization, 4x Daily - RT  methylPREDNISolone sodium succinate, 40 mg, Intravenous, Q12H  metoprolol succinate XL, 25 mg, Oral, Q12H  nicotine, 1 patch, Transdermal, Q24H  potassium chloride, 20 mEq, Oral, BID With Meals  sodium chloride, 10 mL, Intravenous, Q12H        Infusion:    "     PRN:    acetaminophen    senna-docusate sodium **AND** polyethylene glycol **AND** bisacodyl **AND** bisacodyl    ipratropium-albuterol    nitroglycerin    ondansetron    sodium chloride    sodium chloride  Data Review:  All labs (24hrs):   Recent Results (from the past 24 hour(s))   Adult Transthoracic Echo Complete W/ Cont if Necessary Per Protocol    Collection Time: 05/07/24 12:21 PM   Result Value Ref Range    EF(MOD-bp) 59.3 %    LVIDd 5.1 cm    LVIDs 3.4 cm    IVSd 0.90 cm    LVPWd 0.80 cm    FS 33.3 %    IVS/LVPW 1.13 cm    ESV(cubed) 39.3 ml    LV Sys Vol (BSA corrected) 11.1 cm2    EDV(cubed) 132.7 ml    LV [place] Vol (BSA corrected) 31.1 cm2    LV mass(C)d 151.8 grams    LVOT area 4.2 cm2    LVOT diam 2.30 cm    EDV(MOD-sp2) 74.5 ml    EDV(MOD-sp4) 65.5 ml    ESV(MOD-sp2) 29.7 ml    ESV(MOD-sp4) 23.4 ml    SV(MOD-sp2) 44.8 ml    SV(MOD-sp4) 42.1 ml    SVi(MOD-SP2) 21.3 ml/m2    SVi(MOD-SP4) 20.0 ml/m2    SVi (LVOT) 38.8 ml/m2    EF(MOD-sp2) 60.1 %    EF(MOD-sp4) 64.3 %    MV E max ramos 130.0 cm/sec    MV A max ramos 129.0 cm/sec    MV dec time 0.19 sec    MV E/A 1.01     Med Peak E' Ramos 7.2 cm/sec    Lat Peak E' Ramos 8.1 cm/sec    TR max ramos 188.0 cm/sec    Avg E/e' ratio 16.99     SV(LVOT) 81.8 ml    TAPSE (>1.6) 2.29 cm    RV S' 9.0 cm/sec    LA dimension (2D)  4.7 cm    LV V1 max 118.0 cm/sec    LV V1 max PG 5.6 mmHg    LV V1 mean PG 3.0 mmHg    LV V1 VTI 19.7 cm    Ao pk ramos 136.0 cm/sec    Ao max PG 7.4 mmHg    Ao mean PG 4.0 mmHg    Ao V2 VTI 23.1 cm    YUNIOR(I,D) 3.5 cm2    MV max PG 8.1 mmHg    MV mean PG 5.0 mmHg    MV V2 VTI 29.5 cm    MV P1/2t 46.6 msec    MVA(P1/2t) 4.7 cm2    MVA(VTI) 2.8 cm2    MV dec slope 647.0 cm/sec2    MR max ramos 268.0 cm/sec    MR max PG 28.7 mmHg    TR max PG 14.1 mmHg    RVSP(TR) 29.1 mmHg    RAP systole 15.0 mmHg    RV V1 max PG 2.07 mmHg    RV V1 max 71.9 cm/sec    RV V1 VTI 13.1 cm    PA V2 max 85.8 cm/sec    PA acc time 0.09 sec    Sinus 3.8 cm    STJ 3.3 cm     Dimensionless Index 0.87 (DI)   Folate    Collection Time: 05/07/24  6:12 PM    Specimen: Arm, Left; Blood   Result Value Ref Range    Folate 4.27 (L) 4.78 - 24.20 ng/mL   Vitamin B12    Collection Time: 05/07/24  6:12 PM    Specimen: Arm, Left; Blood   Result Value Ref Range    Vitamin B-12 625 211 - 946 pg/mL   High Sensitivity Troponin T    Collection Time: 05/08/24  6:18 AM    Specimen: Blood   Result Value Ref Range    HS Troponin T 19 (H) <14 ng/L   Phosphorus    Collection Time: 05/08/24  6:18 AM    Specimen: Blood   Result Value Ref Range    Phosphorus 5.2 (H) 2.5 - 4.5 mg/dL   Magnesium    Collection Time: 05/08/24  6:18 AM    Specimen: Blood   Result Value Ref Range    Magnesium 1.7 1.6 - 2.4 mg/dL   Basic Metabolic Panel    Collection Time: 05/08/24  6:18 AM    Specimen: Blood   Result Value Ref Range    Glucose 148 (H) 65 - 99 mg/dL    BUN 17 8 - 23 mg/dL    Creatinine 0.70 0.57 - 1.00 mg/dL    Sodium 142 136 - 145 mmol/L    Potassium 4.4 3.5 - 5.2 mmol/L    Chloride 98 98 - 107 mmol/L    CO2 36.0 (H) 22.0 - 29.0 mmol/L    Calcium 8.5 (L) 8.6 - 10.5 mg/dL    BUN/Creatinine Ratio 24.3 7.0 - 25.0    Anion Gap 8.0 5.0 - 15.0 mmol/L    eGFR 96.1 >60.0 mL/min/1.73        Imaging:  CT Outside Films  This procedure was auto-finalized with no dictation required.  XR Outside Films  This procedure was auto-finalized with no dictation required.       ASSESSMENT:  Acute hypoxic respiratory failure   AE COPD  Hx COVID-19   RLL spiculated mass lesion    HTN  Tobacco abuse  Obesity  Anxiety     PLAN:  Encourage OOB during day   Will need workup for masses  Bronchodilators  Inhaled corticosteroids  IV steroids   Incentive spirometer  Nicotine patch   Electrolytes/ glycemic control  No DVT prophylaxis     Discussed with Dr Shayla Guadalupe, APRN   5/8/2024  09:53 EDT    I personally have examined  and interviewed the patient. I have reviewed the history, data, problems, assessment and plan with our NP.  Total  Critical care time in direct medical management (   ) minutes, This time specifically excludes time spent performing procedures.    Bessy Mcguire MD   5/8/2024  18:33 EDT

## 2024-05-09 PROBLEM — R91.8 LUNG MASS: Status: ACTIVE | Noted: 2024-05-06

## 2024-05-09 LAB
ANION GAP SERPL CALCULATED.3IONS-SCNC: 6 MMOL/L (ref 5–15)
BASOPHILS # BLD AUTO: 0.02 10*3/MM3 (ref 0–0.2)
BASOPHILS NFR BLD AUTO: 0.2 % (ref 0–1.5)
BUN SERPL-MCNC: 23 MG/DL (ref 8–23)
BUN/CREAT SERPL: 32.4 (ref 7–25)
CALCIUM SPEC-SCNC: 8.8 MG/DL (ref 8.6–10.5)
CHLORIDE SERPL-SCNC: 98 MMOL/L (ref 98–107)
CO2 SERPL-SCNC: 38 MMOL/L (ref 22–29)
CREAT SERPL-MCNC: 0.71 MG/DL (ref 0.57–1)
DEPRECATED RDW RBC AUTO: 58.4 FL (ref 37–54)
EGFRCR SERPLBLD CKD-EPI 2021: 94.5 ML/MIN/1.73
EOSINOPHIL # BLD AUTO: 0 10*3/MM3 (ref 0–0.4)
EOSINOPHIL NFR BLD AUTO: 0 % (ref 0.3–6.2)
EPO SERPL-ACNC: 2.5 MIU/ML (ref 2.6–18.5)
ERYTHROCYTE [DISTWIDTH] IN BLOOD BY AUTOMATED COUNT: 17.3 % (ref 12.3–15.4)
GLUCOSE SERPL-MCNC: 154 MG/DL (ref 65–99)
HCT VFR BLD AUTO: 61.9 % (ref 34–46.6)
HGB BLD-MCNC: 18.2 G/DL (ref 12–15.9)
IMM GRANULOCYTES # BLD AUTO: 0.06 10*3/MM3 (ref 0–0.05)
IMM GRANULOCYTES NFR BLD AUTO: 0.6 % (ref 0–0.5)
LYMPHOCYTES # BLD AUTO: 0.68 10*3/MM3 (ref 0.7–3.1)
LYMPHOCYTES NFR BLD AUTO: 6.9 % (ref 19.6–45.3)
MAGNESIUM SERPL-MCNC: 1.9 MG/DL (ref 1.6–2.4)
MCH RBC QN AUTO: 28.7 PG (ref 26.6–33)
MCHC RBC AUTO-ENTMCNC: 29.4 G/DL (ref 31.5–35.7)
MCV RBC AUTO: 97.5 FL (ref 79–97)
MONOCYTES # BLD AUTO: 0.45 10*3/MM3 (ref 0.1–0.9)
MONOCYTES NFR BLD AUTO: 4.6 % (ref 5–12)
NEUTROPHILS NFR BLD AUTO: 8.63 10*3/MM3 (ref 1.7–7)
NEUTROPHILS NFR BLD AUTO: 87.7 % (ref 42.7–76)
NRBC BLD AUTO-RTO: 0 /100 WBC (ref 0–0.2)
PLATELET # BLD AUTO: 142 10*3/MM3 (ref 140–450)
PMV BLD AUTO: 10.9 FL (ref 6–12)
POTASSIUM SERPL-SCNC: 4.6 MMOL/L (ref 3.5–5.2)
RBC # BLD AUTO: 6.35 10*6/MM3 (ref 3.77–5.28)
SODIUM SERPL-SCNC: 142 MMOL/L (ref 136–145)
WBC NRBC COR # BLD AUTO: 9.84 10*3/MM3 (ref 3.4–10.8)

## 2024-05-09 PROCEDURE — 25010000002 METHYLPREDNISOLONE PER 40 MG: Performed by: STUDENT IN AN ORGANIZED HEALTH CARE EDUCATION/TRAINING PROGRAM

## 2024-05-09 PROCEDURE — 85025 COMPLETE CBC W/AUTO DIFF WBC: CPT | Performed by: STUDENT IN AN ORGANIZED HEALTH CARE EDUCATION/TRAINING PROGRAM

## 2024-05-09 PROCEDURE — 99232 SBSQ HOSP IP/OBS MODERATE 35: CPT | Performed by: INTERNAL MEDICINE

## 2024-05-09 PROCEDURE — 83735 ASSAY OF MAGNESIUM: CPT | Performed by: STUDENT IN AN ORGANIZED HEALTH CARE EDUCATION/TRAINING PROGRAM

## 2024-05-09 PROCEDURE — 80048 BASIC METABOLIC PNL TOTAL CA: CPT | Performed by: STUDENT IN AN ORGANIZED HEALTH CARE EDUCATION/TRAINING PROGRAM

## 2024-05-09 PROCEDURE — 94799 UNLISTED PULMONARY SVC/PX: CPT

## 2024-05-09 PROCEDURE — 99222 1ST HOSP IP/OBS MODERATE 55: CPT | Performed by: INTERNAL MEDICINE

## 2024-05-09 PROCEDURE — 94761 N-INVAS EAR/PLS OXIMETRY MLT: CPT

## 2024-05-09 PROCEDURE — 25010000002 ACETAZOLAMIDE PER 500 MG: Performed by: NURSE PRACTITIONER

## 2024-05-09 PROCEDURE — 25010000002 FUROSEMIDE PER 20 MG: Performed by: NURSE PRACTITIONER

## 2024-05-09 PROCEDURE — 94664 DEMO&/EVAL PT USE INHALER: CPT

## 2024-05-09 RX ORDER — FOLIC ACID 1 MG/1
1 TABLET ORAL DAILY
Status: DISCONTINUED | OUTPATIENT
Start: 2024-05-09 | End: 2024-05-14 | Stop reason: HOSPADM

## 2024-05-09 RX ORDER — METOLAZONE 2.5 MG/1
2.5 TABLET ORAL DAILY
Status: DISCONTINUED | OUTPATIENT
Start: 2024-05-09 | End: 2024-05-11

## 2024-05-09 RX ADMIN — ACETAZOLAMIDE 250 MG: 500 INJECTION, POWDER, LYOPHILIZED, FOR SOLUTION INTRAVENOUS at 13:29

## 2024-05-09 RX ADMIN — IPRATROPIUM BROMIDE AND ALBUTEROL SULFATE 3 ML: .5; 3 SOLUTION RESPIRATORY (INHALATION) at 11:27

## 2024-05-09 RX ADMIN — FUROSEMIDE 40 MG: 10 INJECTION, SOLUTION INTRAMUSCULAR; INTRAVENOUS at 03:01

## 2024-05-09 RX ADMIN — ALPRAZOLAM 1 MG: 1 TABLET ORAL at 08:50

## 2024-05-09 RX ADMIN — Medication 10 ML: at 20:22

## 2024-05-09 RX ADMIN — METHYLPREDNISOLONE SODIUM SUCCINATE 40 MG: 40 INJECTION, POWDER, FOR SOLUTION INTRAMUSCULAR; INTRAVENOUS at 09:00

## 2024-05-09 RX ADMIN — FUROSEMIDE 40 MG: 10 INJECTION, SOLUTION INTRAMUSCULAR; INTRAVENOUS at 15:00

## 2024-05-09 RX ADMIN — FLUOXETINE 20 MG: 20 CAPSULE ORAL at 08:51

## 2024-05-09 RX ADMIN — FLUOXETINE 10 MG: 10 CAPSULE ORAL at 08:50

## 2024-05-09 RX ADMIN — POTASSIUM CHLORIDE 20 MEQ: 1500 TABLET, EXTENDED RELEASE ORAL at 08:50

## 2024-05-09 RX ADMIN — IPRATROPIUM BROMIDE AND ALBUTEROL SULFATE 3 ML: .5; 3 SOLUTION RESPIRATORY (INHALATION) at 18:20

## 2024-05-09 RX ADMIN — POTASSIUM CHLORIDE 20 MEQ: 1500 TABLET, EXTENDED RELEASE ORAL at 17:34

## 2024-05-09 RX ADMIN — Medication 10 ML: at 08:54

## 2024-05-09 RX ADMIN — IPRATROPIUM BROMIDE AND ALBUTEROL SULFATE 3 ML: .5; 3 SOLUTION RESPIRATORY (INHALATION) at 06:34

## 2024-05-09 RX ADMIN — METOPROLOL SUCCINATE 25 MG: 25 TABLET, EXTENDED RELEASE ORAL at 08:51

## 2024-05-09 RX ADMIN — METHYLPREDNISOLONE SODIUM SUCCINATE 40 MG: 40 INJECTION, POWDER, FOR SOLUTION INTRAMUSCULAR; INTRAVENOUS at 22:32

## 2024-05-09 RX ADMIN — ALPRAZOLAM 1 MG: 1 TABLET ORAL at 14:58

## 2024-05-09 RX ADMIN — ACETAMINOPHEN 650 MG: 325 TABLET, FILM COATED ORAL at 10:39

## 2024-05-09 RX ADMIN — METOPROLOL SUCCINATE 25 MG: 25 TABLET, EXTENDED RELEASE ORAL at 20:22

## 2024-05-09 RX ADMIN — BUDESONIDE 0.5 MG: 0.5 INHALANT RESPIRATORY (INHALATION) at 18:24

## 2024-05-09 RX ADMIN — ALPRAZOLAM 1 MG: 1 TABLET ORAL at 20:22

## 2024-05-09 RX ADMIN — IPRATROPIUM BROMIDE AND ALBUTEROL SULFATE 3 ML: .5; 3 SOLUTION RESPIRATORY (INHALATION) at 15:21

## 2024-05-09 RX ADMIN — METOLAZONE 2.5 MG: 2.5 TABLET ORAL at 18:22

## 2024-05-09 RX ADMIN — BUDESONIDE 0.5 MG: 0.5 INHALANT RESPIRATORY (INHALATION) at 06:40

## 2024-05-09 RX ADMIN — FOLIC ACID 1 MG: 1 TABLET ORAL at 13:27

## 2024-05-09 NOTE — PROGRESS NOTES
"PULMONARY CRITICAL CARE PROGRESS  NOTE      PATIENT IDENTIFICATION:  Name: Jeanne King  MRN: [id number]  :  1960     Age: 64 y.o.  Sex: female    DATE OF Note:  2024   Referring Physician: Tiarra Renee MD                  Subjective:   Feeling better, sitting up in bed eating breakfast, no SOB, no chest or abd pain, no bowel or bladder issues       Objective:  tMax 24 hrs: Temp (24hrs), Av.9 °F (36.6 °C), Min:97.8 °F (36.6 °C), Max:98 °F (36.7 °C)      Vitals Ranges:   Temp:  [97.8 °F (36.6 °C)-98 °F (36.7 °C)] 98 °F (36.7 °C)  Heart Rate:  [54-69] 55  Resp:  [11-18] 16  BP: ()/(56-77) 130/77    Intake and Output Last 3 Shifts:   I/O last 3 completed shifts:  In: 1800 [P.O.:1800]  Out: 1100 [Urine:1100]    Exam:  /77   Pulse 55   Temp 98 °F (36.7 °C) (Oral)   Resp 16   Ht 172.7 cm (68\")   Wt 99 kg (218 lb 4.1 oz)   SpO2 92%   BMI 33.19 kg/m²     General Appearance:  AAO   HEENT:  Normocephalic, without obvious abnormality. Conjunctivae/corneas clear.  Normal external ear canals. Nares normal, no drainage     Neck:  Supple, symmetrical, trachea midline. No JVD.  Lungs /Chest wall:   Bilateral basal rhonchi, respirations unlabored, symmetrical wall movement.     Heart:  Regular rate and rhythm, systolic murmur PMI left sternal border  Abdomen: Soft, nontender, no masses, no organomegaly.    Extremities: Trace edema, no clubbing or cyanosis        Medications:  ALPRAZolam, 1 mg, Oral, TID  budesonide, 0.5 mg, Nebulization, BID - RT  FLUoxetine, 10 mg, Oral, Daily  FLUoxetine, 20 mg, Oral, Daily  furosemide, 40 mg, Intravenous, Q12H  ipratropium-albuterol, 3 mL, Nebulization, 4x Daily - RT  methylPREDNISolone sodium succinate, 40 mg, Intravenous, Q12H  metoprolol succinate XL, 25 mg, Oral, Q12H  nicotine, 1 patch, Transdermal, Q24H  potassium chloride, 20 mEq, Oral, BID With Meals  sodium chloride, 10 mL, Intravenous, Q12H        Infusion:        PRN:    acetaminophen    " senna-docusate sodium **AND** polyethylene glycol **AND** bisacodyl **AND** bisacodyl    ipratropium-albuterol    nitroglycerin    ondansetron    sodium chloride    sodium chloride  Data Review:  All labs (24hrs):   Recent Results (from the past 24 hour(s))   Magnesium    Collection Time: 05/09/24  3:08 AM    Specimen: Blood   Result Value Ref Range    Magnesium 1.9 1.6 - 2.4 mg/dL   Basic Metabolic Panel    Collection Time: 05/09/24  3:08 AM    Specimen: Blood   Result Value Ref Range    Glucose 154 (H) 65 - 99 mg/dL    BUN 23 8 - 23 mg/dL    Creatinine 0.71 0.57 - 1.00 mg/dL    Sodium 142 136 - 145 mmol/L    Potassium 4.6 3.5 - 5.2 mmol/L    Chloride 98 98 - 107 mmol/L    CO2 38.0 (H) 22.0 - 29.0 mmol/L    Calcium 8.8 8.6 - 10.5 mg/dL    BUN/Creatinine Ratio 32.4 (H) 7.0 - 25.0    Anion Gap 6.0 5.0 - 15.0 mmol/L    eGFR 94.5 >60.0 mL/min/1.73   CBC Auto Differential    Collection Time: 05/09/24  3:08 AM    Specimen: Blood   Result Value Ref Range    WBC 9.84 3.40 - 10.80 10*3/mm3    RBC 6.35 (H) 3.77 - 5.28 10*6/mm3    Hemoglobin 18.2 (H) 12.0 - 15.9 g/dL    Hematocrit 61.9 (H) 34.0 - 46.6 %    MCV 97.5 (H) 79.0 - 97.0 fL    MCH 28.7 26.6 - 33.0 pg    MCHC 29.4 (L) 31.5 - 35.7 g/dL    RDW 17.3 (H) 12.3 - 15.4 %    RDW-SD 58.4 (H) 37.0 - 54.0 fl    MPV 10.9 6.0 - 12.0 fL    Platelets 142 140 - 450 10*3/mm3    Neutrophil % 87.7 (H) 42.7 - 76.0 %    Lymphocyte % 6.9 (L) 19.6 - 45.3 %    Monocyte % 4.6 (L) 5.0 - 12.0 %    Eosinophil % 0.0 (L) 0.3 - 6.2 %    Basophil % 0.2 0.0 - 1.5 %    Immature Grans % 0.6 (H) 0.0 - 0.5 %    Neutrophils, Absolute 8.63 (H) 1.70 - 7.00 10*3/mm3    Lymphocytes, Absolute 0.68 (L) 0.70 - 3.10 10*3/mm3    Monocytes, Absolute 0.45 0.10 - 0.90 10*3/mm3    Eosinophils, Absolute 0.00 0.00 - 0.40 10*3/mm3    Basophils, Absolute 0.02 0.00 - 0.20 10*3/mm3    Immature Grans, Absolute 0.06 (H) 0.00 - 0.05 10*3/mm3    nRBC 0.0 0.0 - 0.2 /100 WBC        Imaging:  CT Outside Films  This procedure was  auto-finalized with no dictation required.  XR Outside Films  This procedure was auto-finalized with no dictation required.       ASSESSMENT:  Acute hypoxic respiratory failure   AE COPD  Hx COVID-19   RLL spiculated mass lesion    HTN  Tobacco abuse  Obesity  Anxiety     PLAN:   EBUS in am   NPO after midnight  Get consent  Encourage OOB during day   Bronchodilators  Inhaled corticosteroids  IV steroids   Incentive spirometer  Nicotine patch   Electrolytes/ glycemic control  No DVT prophylaxis     Discussed with Dr Shayla Guadalupe, APRN   5/9/2024  10:16 EDT    I personally have examined  and interviewed the patient. I have reviewed the history, data, problems, assessment and plan with our NP.  Total Critical care time in direct medical management (   ) minutes, This time specifically excludes time spent performing procedures.    Bessy Mcguire MD   5/9/2024  12:54 EDT

## 2024-05-09 NOTE — PROGRESS NOTES
[facility] MEDICINE SERVICE  DAILY PROGRESS NOTE    NAME: Jeanne King  : 1960  MRN: [id number]      LOS: 2 days     PROVIDER OF SERVICE: Bon Dunn MD    Chief Complaint: Acute hypoxic respiratory failure    Subjective:     Interval History:  History taken from: patient  Patient Complaints: Sob improving Patient Denies: Nausea or vomiting    Review of Systems:   Review of Systems  14 point review of system unremarkable except mentioned above  Objective:     Vital Signs  Temp:  [97.8 °F (36.6 °C)-98.4 °F (36.9 °C)] 98.4 °F (36.9 °C)  Heart Rate:  [53-69] 56  Resp:  [11-18] 16  BP: ()/(56-77) 117/75  Flow (L/min):  [4-6] 4   Body mass index is 33.19 kg/m².    Physical Exam  Physical Exam  HENT:      Head: Atraumatic.      Mouth/Throat:      Mouth: Mucous membranes are moist.   Eyes:      Pupils: Pupils are equal, round, and reactive to light.   Cardiovascular:      Rate and Rhythm: Normal rate and regular rhythm.      Pulses: Normal pulses.      Heart sounds: Normal heart sounds.   Pulmonary:      Breath sounds: Rales present.   Abdominal:      General: Bowel sounds are normal.      Palpations: Abdomen is soft.   Musculoskeletal:      Right lower leg: Edema present.      Left lower leg: Edema present.   Skin:     General: Skin is warm.   Neurological:      General: No focal deficit present.      Mental Status: She is alert and oriented to person, place, and time.   Psychiatric:         Mood and Affect: Mood normal.         Scheduled Meds   acetaZOLAMIDE (DIAMOX) 250 mg in sterile water (preservative free) 2.5 mL injection, 250 mg, Intravenous, Daily  ALPRAZolam, 1 mg, Oral, TID  budesonide, 0.5 mg, Nebulization, BID - RT  FLUoxetine, 10 mg, Oral, Daily  FLUoxetine, 20 mg, Oral, Daily  folic acid, 1 mg, Oral, Daily  furosemide, 40 mg, Intravenous, Q12H  ipratropium-albuterol, 3 mL, Nebulization, 4x Daily - RT  methylPREDNISolone sodium succinate, 40 mg, Intravenous, Q12H  metoprolol  succinate XL, 25 mg, Oral, Q12H  nicotine, 1 patch, Transdermal, Q24H  potassium chloride, 20 mEq, Oral, BID With Meals  sodium chloride, 10 mL, Intravenous, Q12H       PRN Meds     acetaminophen    senna-docusate sodium **AND** polyethylene glycol **AND** bisacodyl **AND** bisacodyl    ipratropium-albuterol    nitroglycerin    ondansetron    sodium chloride    sodium chloride   Infusions         Diagnostic Data    Results from last 7 days   Lab Units 05/09/24  0308 05/08/24  0618 05/07/24  0419   WBC 10*3/mm3 9.84  --  8.90   HEMOGLOBIN g/dL 18.2*  --  18.4*   HEMATOCRIT % 61.9*  --  59.9*   PLATELETS 10*3/mm3 142  --  108*   GLUCOSE mg/dL 154*   < > 102*   CREATININE mg/dL 0.71   < > 0.59   BUN mg/dL 23   < > 12   SODIUM mmol/L 142   < > 140   POTASSIUM mmol/L 4.6   < > 3.8   AST (SGOT) U/L  --   --  19   ALT (SGPT) U/L  --   --  16   ALK PHOS U/L  --   --  85   BILIRUBIN mg/dL  --   --  1.6*   ANION GAP mmol/L 6.0   < > 10.0    < > = values in this interval not displayed.       No radiology results for the last day      I reviewed the patient's new clinical results.    Assessment/Plan:     Active and Resolved Problems  #Acute hypoxemic respiratory failure  #Acute exacerbation of likely diastolic heart failure  #Acute pulmonary edema  # suspicious Malignant lung neoplasm and mediastinal   Lymphadenopathy  #Bilateral lower extremity edema likely from above  -Continue to supplement oxygen to keep saturation above 90 to 92%  - CT of the chest report as below  1. No pulmonary emboli 2. Bilateral rond glass patchy opacities nonspecific suggestion of pulmonary edema.  A right lower lobe 2.3 cm spiculated mass lesion, another more distal lesion 1.7 cm.  These lesions are highly concerning for primary lung neoplasm and recommend biopsy or PET/CT exam.  Lingular atelectasis.  No pneumothorax or pleural effusion.  Mediastinal and hilar lymphadenopathy.  #3 cardiomegaly and coronary artery calcifications.  #4 cirrhotic  enlarged liver.  Splenomegaly.  Abdominal ascites.  - afebrile. Negative for cough or fever. Negative leukocytosis. Does not appear to have pneumonia     Continue IV Lasix 40 mg twice daily  - Transthoracic echocardiogram  noted   -cardiology team consulted   - plan for EBUS am      Liver cirrhosis     -Ultrasound noted for subtotal nodular surface contour of the liver with mildly coarsened echotexture raising the possibility of cirrhosis and no focal as well as ascites seen  -HCC  Screening     #Erythrocytosis  -Hematocrit 59.9  - Serum EPO level     Essential hypertension  - Continue to monitor  - Continue Norvasc     #History of anxiety-continue Xanax and Protonix     #COPD  -Inhalers bronchodilators and IV steroids     Obesity BMI 32.82  - Lifestyle and dietary management        DVT prophylaxis:  Mechanical DVT prophylaxis orders are present.         Code status is   Code Status and Medical Interventions:   Ordered at: 05/07/24 0243     Level Of Support Discussed With:    Patient     Code Status (Patient has no pulse and is not breathing):    CPR (Attempt to Resuscitate)     Medical Interventions (Patient has pulse or is breathing):    Full Support       Plan for disposition: home  in 1-2  days, currently requires diuresis    Time: 35 minutes    Signature: Electronically signed by Bon Dunn MD, 05/09/24, 15:45 EDT.  [facility] Hospitalist Team

## 2024-05-09 NOTE — PROGRESS NOTES
[facility]        LOS:  LOS: 2 days   Patient Name: Jeanne King  Age/Sex: 64 y.o. female  : 1960  MRN: [id number]    Day of Service: 24   Length of Stay: 2  Encounter Provider: Josh Mendez MD  Place of Service: [facility] CARDIOLOGY  Patient Care Team:  Provider, No Known as PCP - General    Subjective:     Chief Complaint: f/u shortness of breath / edema    Subjective: patient remains on 6L high flow. Edema improving    Current Medications:   Scheduled Meds:acetaZOLAMIDE (DIAMOX) 250 mg in sterile water (preservative free) 2.5 mL injection, 250 mg, Intravenous, Daily  ALPRAZolam, 1 mg, Oral, TID  budesonide, 0.5 mg, Nebulization, BID - RT  FLUoxetine, 10 mg, Oral, Daily  FLUoxetine, 20 mg, Oral, Daily  folic acid, 1 mg, Oral, Daily  furosemide, 40 mg, Intravenous, Q12H  ipratropium-albuterol, 3 mL, Nebulization, 4x Daily - RT  methylPREDNISolone sodium succinate, 40 mg, Intravenous, Q12H  metOLazone, 2.5 mg, Oral, Daily  metoprolol succinate XL, 25 mg, Oral, Q12H  nicotine, 1 patch, Transdermal, Q24H  potassium chloride, 20 mEq, Oral, BID With Meals  sodium chloride, 10 mL, Intravenous, Q12H      Continuous Infusions:     Allergies:  Allergies   Allergen Reactions    Keflex [Cephalexin] Mental Status Change and Provider Review Needed    Morphine Mental Status Change    Premarin [Conjugated Estrogens] Mental Status Change       Review of Systems   Constitutional: Positive for malaise/fatigue. Negative for chills and diaphoresis.   Cardiovascular:  Positive for dyspnea on exertion and leg swelling. Negative for chest pain, irregular heartbeat, near-syncope, orthopnea, palpitations, paroxysmal nocturnal dyspnea and syncope.   Respiratory:  Negative for cough, shortness of breath, sleep disturbances due to breathing and sputum production.    Gastrointestinal:  Negative for change in bowel habit.   Genitourinary:  Negative for urgency.   Neurological:  Negative  for dizziness and headaches.   Psychiatric/Behavioral:  Negative for altered mental status.          Objective:     Temp:  [97.8 °F (36.6 °C)-98.4 °F (36.9 °C)] 98.1 °F (36.7 °C)  Heart Rate:  [53-69] 63  Resp:  [12-18] 17  BP: ()/(56-77) 112/70     Intake/Output Summary (Last 24 hours) at 5/9/2024 1730  Last data filed at 5/9/2024 1718  Gross per 24 hour   Intake 1320 ml   Output 800 ml   Net 520 ml     Body mass index is 33.19 kg/m².      05/07/24  1121 05/08/24  0519 05/09/24  0500   Weight: 97.5 kg (215 lb) 97.9 kg (215 lb 13.3 oz) 99 kg (218 lb 4.1 oz)         General Appearance:    Alert, cooperative, in no acute distress                                Head: Atraumatic, normocephalic, PERRLA               Neck:   supple, no JVD   Lungs:     Supplemental O2, dim bilat bases    Heart:    Regular rhythm and normal rate, normal S1 and S2   Abdomen:     Normal bowel sounds, no masses, no organomegaly, soft  nontender, nondistended, no guarding, no rebound  tenderness   Extremities:   Moves all extremities well, edema, no cyanosis, no  redness   Pulses:   Pulses palpable and equal bilaterally   Skin:   No bleeding, bruising or rash   Neurologic:   Awake, alert, oriented x3         Lab Review:   Results from last 7 days   Lab Units 05/09/24  0308 05/08/24  0618 05/07/24  0419   SODIUM mmol/L 142 142 140   POTASSIUM mmol/L 4.6 4.4 3.8   CHLORIDE mmol/L 98 98 99   CO2 mmol/L 38.0* 36.0* 31.0*   BUN mg/dL 23 17 12   CREATININE mg/dL 0.71 0.70 0.59   GLUCOSE mg/dL 154* 148* 102*   CALCIUM mg/dL 8.8 8.5* 8.3*   AST (SGOT) U/L  --   --  19   ALT (SGPT) U/L  --   --  16     Results from last 7 days   Lab Units 05/08/24  1016 05/08/24  0618   HSTROP T ng/L 22* 19*     Results from last 7 days   Lab Units 05/09/24  0308 05/07/24  0419   WBC 10*3/mm3 9.84 8.90   HEMOGLOBIN g/dL 18.2* 18.4*   HEMATOCRIT % 61.9* 59.9*   PLATELETS 10*3/mm3 142 108*         Results from last 7 days   Lab Units 05/09/24  0308 05/08/24  0618  "  MAGNESIUM mg/dL 1.9 1.7     Results from last 7 days   Lab Units 05/07/24 0419   CHOLESTEROL mg/dL 81   TRIGLYCERIDES mg/dL 60   HDL CHOL mg/dL 34*     Results from last 7 days   Lab Units 05/07/24 0419   PROBNP pg/mL 1,213.0*     Results from last 7 days   Lab Units 05/07/24 0419   TSH uIU/mL 0.611       Recent Radiology:  Imaging Results (Most Recent)       Procedure Component Value Units Date/Time    CT Outside Films [[id number]] Resulted: 05/08/24 0935     Updated: 05/08/24 0935    Narrative:      This procedure was auto-finalized with no dictation required.    XR Outside Films [[id number]] Resulted: 05/08/24 0935     Updated: 05/08/24 0935    Narrative:      This procedure was auto-finalized with no dictation required.    US Liver [[id number]] Collected: 05/07/24 0731     Updated: 05/07/24 0735    Narrative:      DATE OF EXAM:  5/7/2024 4:35 AM     PROCEDURE:  US LIVER-     INDICATIONS:  ascites, eval for cirrhosis     COMPARISON:  No Comparisons Available     TECHNIQUE:   Grayscale and color Doppler ultrasound evaluation of the limited abdomen  was performed.     FINDINGS:  The liver is enlarged, 20.0 cm in length. The liver echotexture is  coarsened. There is a subtle nodular surface contour of the liver  suggestive of cirrhosis. No focal liver lesion is identified. There is a  \"starry eric\" pattern of the liver parenchyma, which may also reflect  changes of chronic liver disease. No ascites is evident. Main portal  vein is patent with hepatopetal flow. The imaged hepatic veins are  patent. Common bile duct caliber is within normal limits, 6 mm. No  intrahepatic biliary ductal dilation is observed. The intrahepatic IVC  demonstrates color flow.       Impression:      1. Hepatomegaly.  2. Subtle nodular surface contour of the liver with mildly coarsened  echotexture, raising the possibility of cirrhosis.  3. No focal liver lesion is seen.  4. No ascites is seen.     Electronically Signed By-Jane Woodruff MD " On:5/7/2024 7:33 AM               ECHOCARDIOGRAM:    Results for orders placed during the hospital encounter of 05/07/24    Adult Transthoracic Echo Complete W/ Cont if Necessary Per Protocol    Interpretation Summary    Left ventricular systolic function is normal. Left ventricular ejection fraction appears to be 56 - 60%.    Left ventricular diastolic function is consistent with (grade I) impaired relaxation.    Mildly reduced right ventricular systolic function noted.    The right ventricular cavity is moderately dilated.    Estimated right ventricular systolic pressure from tricuspid regurgitation is normal (<35 mmHg).    IVC is dilated        I reviewed the patient's new clinical results.    EKG:      Assessment:       Acute hypoxic respiratory failure    Lung mass    Acute hypoxic and hypercapnic respiratory insufficiency  Volume overload / HFpEF / RV dysfunction  HTN  Tobacco abuse  Abnormal CT at OSH with Concern for primary lung neoplasm with 2 lung lesions identified with mediastinal and hilar lymphadenopathy and cirrhotic changes of liver  Polycythemia       Plan:   Patient admitted with shortness of breath, lower extremity edema, long term smoker, has not seen physician in a number of years, CT at [facility] with concern for lung neoplasm and hilar lymphadenopathy  She is overloaded, continue IV diuresis- add diamox for alkalosis  Stress test as outpt once improves from lung standpoint / on less O2  Needs SHANKAR work up- pulmonary consulted    Patient is seen and examined and findings are verified.  All data is reviewed by me personally.  Assessment and plan formulated by APC was done after discussion with attending.  I spent more than 50% of time in taking care of the patient.    Patient is sitting up in the bed.  Patient is still on oxygen with high flow.    Hemodynamics are stable    Normal S1 and S2.  No pericardial rub or murmur abdominal exam is benign leg edema present    MDM:    1.  Bilateral leg  edema:    Patient still have significant leg edema.  I will recommend to decrease fluid and salt intake.  Continue diuretics with Lasix.  Add metolazone    2.  Hypertension:    Blood pressure is controlled continue Lopressor.    3.  Obstructive sleep apnea:    Patient would undergo sleep studies as outpatient further recommendation as per PCP    4.  Polycythemia:    Most likely cause of polycythemia is obstructive sleep apnea.  Patient may need hematology consultation    Electronically signed by Josh Mendez MD, 05/09/24, 5:30 PM EDT.          Josh Mendez MD  05/09/24  17:30 EDT

## 2024-05-09 NOTE — PLAN OF CARE
Goal Outcome Evaluation: Pt pleasant throughout day. No complaints. On 4L HFNC. NPO at midnight for bronchoscopy tomorrow. Consent obtained. VSS. Care ongoing.

## 2024-05-09 NOTE — CASE MANAGEMENT/SOCIAL WORK
Discharge Planning Assessment   [place]     Patient Name: Jeanne King  MRN: [id number]  Today's Date: 5/9/2024    Admit Date: 5/7/2024    Plan: Home alone. Watch for O2 needs.   Discharge Needs Assessment       Row Name 05/09/24 1525       Living Environment    People in Home alone    Current Living Arrangements home    Potentially Unsafe Housing Conditions none    In the past 12 months has the electric, gas, oil, or water company threatened to shut off services in your home? No    Primary Care Provided by self    Provides Primary Care For no one    Quality of Family Relationships helpful;involved;supportive    Able to Return to Prior Arrangements yes       Resource/Environmental Concerns    Resource/Environmental Concerns none    Transportation Concerns none       Transportation Needs    In the past 12 months, has lack of transportation kept you from medical appointments or from getting medications? no    In the past 12 months, has lack of transportation kept you from meetings, work, or from getting things needed for daily living? No       Food Insecurity    Within the past 12 months, you worried that your food would run out before you got the money to buy more. Never true    Within the past 12 months, the food you bought just didn't last and you didn't have money to get more. Never true       Transition Planning    Patient/Family Anticipates Transition to home    Patient/Family Anticipated Services at Transition none    Transportation Anticipated family or friend will provide       Discharge Needs Assessment    Readmission Within the Last 30 Days no previous admission in last 30 days    Equipment Currently Used at Home none    Concerns to be Addressed discharge planning    Anticipated Changes Related to Illness none    Equipment Needed After Discharge none                   Discharge Plan       Row Name 05/09/24 1519       Plan    Plan Home alone. Watch for O2 needs.    Patient/Family in Agreement with Plan yes     Plan Comments Met with patient at bedside. Lives at home alone.  IADL at baseline.  Family is very helpful.  When she needs minimal  assistance, her 15 year old granddaughter will stay with her to help out.  PCP Marilyn Barry NP.  Denies discharge needs.  Barriers to discharge: 6L HF O2.  Bronch tomorrow.  Plan stress test after respiratory stable.  IV lasix.  PO steroids.                  Continued Care and Services - Admitted Since 5/7/2024    No active coordination exists for this encounter.       Expected Discharge Date and Time       Expected Discharge Date Expected Discharge Time    May 11, 2024            Demographic Summary       Row Name 05/09/24 1525       General Information    Admission Type inpatient    Arrived From emergency department    Required Notices Provided Important Message from Medicare    Referral Source admission list    Reason for Consult discharge planning    Preferred Language English                   Functional Status       Row Name 05/09/24 1525       Functional Status    Usual Activity Tolerance good    Current Activity Tolerance good       Functional Status, IADL    Medications independent    Meal Preparation independent    Housekeeping independent    Laundry independent    Shopping independent       Mental Status    General Appearance WDL WDL       Mental Status Summary    Recent Changes in Mental Status/Cognitive Functioning no changes             Yolanda Medina RN      Office Phone [phone]  Office Cell [phone]

## 2024-05-10 ENCOUNTER — ANESTHESIA (OUTPATIENT)
Dept: GASTROENTEROLOGY | Facility: HOSPITAL | Age: 64
End: 2024-05-10
Payer: MEDICARE

## 2024-05-10 ENCOUNTER — ANESTHESIA EVENT (OUTPATIENT)
Dept: GASTROENTEROLOGY | Facility: HOSPITAL | Age: 64
End: 2024-05-10
Payer: MEDICARE

## 2024-05-10 ENCOUNTER — APPOINTMENT (OUTPATIENT)
Dept: GENERAL RADIOLOGY | Facility: HOSPITAL | Age: 64
DRG: 189 | End: 2024-05-10
Payer: MEDICARE

## 2024-05-10 LAB
ALBUMIN SERPL-MCNC: 3.4 G/DL (ref 3.5–5.2)
ALBUMIN/GLOB SERPL: 1.1 G/DL
ALP SERPL-CCNC: 92 U/L (ref 39–117)
ALT SERPL W P-5'-P-CCNC: 45 U/L (ref 1–33)
ANION GAP SERPL CALCULATED.3IONS-SCNC: 7 MMOL/L (ref 5–15)
AST SERPL-CCNC: 36 U/L (ref 1–32)
B PARAPERT DNA SPEC QL NAA+PROBE: NOT DETECTED
B PERT DNA SPEC QL NAA+PROBE: NOT DETECTED
BASOPHILS # BLD AUTO: 0.01 10*3/MM3 (ref 0–0.2)
BASOPHILS NFR BLD AUTO: 0.1 % (ref 0–1.5)
BILIRUB SERPL-MCNC: 0.9 MG/DL (ref 0–1.2)
BUN SERPL-MCNC: 27 MG/DL (ref 8–23)
BUN/CREAT SERPL: 38 (ref 7–25)
C PNEUM DNA NPH QL NAA+NON-PROBE: NOT DETECTED
CALCIUM SPEC-SCNC: 9.2 MG/DL (ref 8.6–10.5)
CHLORIDE SERPL-SCNC: 94 MMOL/L (ref 98–107)
CO2 SERPL-SCNC: 36 MMOL/L (ref 22–29)
CREAT SERPL-MCNC: 0.71 MG/DL (ref 0.57–1)
DEPRECATED RDW RBC AUTO: 56.1 FL (ref 37–54)
EGFRCR SERPLBLD CKD-EPI 2021: 94.5 ML/MIN/1.73
EOSINOPHIL # BLD AUTO: 0 10*3/MM3 (ref 0–0.4)
EOSINOPHIL NFR BLD AUTO: 0 % (ref 0.3–6.2)
ERYTHROCYTE [DISTWIDTH] IN BLOOD BY AUTOMATED COUNT: 16.2 % (ref 12.3–15.4)
FLUAV SUBTYP SPEC NAA+PROBE: NOT DETECTED
FLUBV RNA ISLT QL NAA+PROBE: NOT DETECTED
GLOBULIN UR ELPH-MCNC: 3 GM/DL
GLUCOSE SERPL-MCNC: 123 MG/DL (ref 65–99)
HADV DNA SPEC NAA+PROBE: NOT DETECTED
HCOV 229E RNA SPEC QL NAA+PROBE: NOT DETECTED
HCOV HKU1 RNA SPEC QL NAA+PROBE: NOT DETECTED
HCOV NL63 RNA SPEC QL NAA+PROBE: NOT DETECTED
HCOV OC43 RNA SPEC QL NAA+PROBE: NOT DETECTED
HCT VFR BLD AUTO: 59.8 % (ref 34–46.6)
HGB BLD-MCNC: 18.1 G/DL (ref 12–15.9)
HMPV RNA NPH QL NAA+NON-PROBE: NOT DETECTED
HPIV1 RNA ISLT QL NAA+PROBE: NOT DETECTED
HPIV2 RNA SPEC QL NAA+PROBE: NOT DETECTED
HPIV3 RNA NPH QL NAA+PROBE: NOT DETECTED
HPIV4 P GENE NPH QL NAA+PROBE: NOT DETECTED
IMM GRANULOCYTES # BLD AUTO: 0.05 10*3/MM3 (ref 0–0.05)
IMM GRANULOCYTES NFR BLD AUTO: 0.5 % (ref 0–0.5)
LYMPHOCYTES # BLD AUTO: 0.99 10*3/MM3 (ref 0.7–3.1)
LYMPHOCYTES NFR BLD AUTO: 9.5 % (ref 19.6–45.3)
M PNEUMO IGG SER IA-ACNC: NOT DETECTED
MAGNESIUM SERPL-MCNC: 1.9 MG/DL (ref 1.6–2.4)
MCH RBC QN AUTO: 29.1 PG (ref 26.6–33)
MCHC RBC AUTO-ENTMCNC: 30.3 G/DL (ref 31.5–35.7)
MCV RBC AUTO: 96.3 FL (ref 79–97)
MONOCYTES # BLD AUTO: 0.61 10*3/MM3 (ref 0.1–0.9)
MONOCYTES NFR BLD AUTO: 5.8 % (ref 5–12)
NEUTROPHILS NFR BLD AUTO: 8.8 10*3/MM3 (ref 1.7–7)
NEUTROPHILS NFR BLD AUTO: 84.1 % (ref 42.7–76)
NRBC BLD AUTO-RTO: 0 /100 WBC (ref 0–0.2)
PHOSPHATE SERPL-MCNC: 3.5 MG/DL (ref 2.5–4.5)
PLATELET # BLD AUTO: 124 10*3/MM3 (ref 140–450)
PMV BLD AUTO: 10.8 FL (ref 6–12)
POTASSIUM SERPL-SCNC: 5 MMOL/L (ref 3.5–5.2)
PROT SERPL-MCNC: 6.4 G/DL (ref 6–8.5)
RBC # BLD AUTO: 6.21 10*6/MM3 (ref 3.77–5.28)
RHINOVIRUS RNA SPEC NAA+PROBE: NOT DETECTED
RSV RNA NPH QL NAA+NON-PROBE: NOT DETECTED
SARS-COV-2 RNA NPH QL NAA+NON-PROBE: NOT DETECTED
SODIUM SERPL-SCNC: 137 MMOL/L (ref 136–145)
WBC NRBC COR # BLD AUTO: 10.46 10*3/MM3 (ref 3.4–10.8)

## 2024-05-10 PROCEDURE — C1726 CATH, BAL DIL, NON-VASCULAR: HCPCS | Performed by: INTERNAL MEDICINE

## 2024-05-10 PROCEDURE — 88305 TISSUE EXAM BY PATHOLOGIST: CPT | Performed by: INTERNAL MEDICINE

## 2024-05-10 PROCEDURE — 80053 COMPREHEN METABOLIC PANEL: CPT | Performed by: STUDENT IN AN ORGANIZED HEALTH CARE EDUCATION/TRAINING PROGRAM

## 2024-05-10 PROCEDURE — 25010000002 ONDANSETRON PER 1 MG: Performed by: NURSE ANESTHETIST, CERTIFIED REGISTERED

## 2024-05-10 PROCEDURE — 88172 CYTP DX EVAL FNA 1ST EA SITE: CPT | Performed by: INTERNAL MEDICINE

## 2024-05-10 PROCEDURE — 87798 DETECT AGENT NOS DNA AMP: CPT | Performed by: INTERNAL MEDICINE

## 2024-05-10 PROCEDURE — 85025 COMPLETE CBC W/AUTO DIFF WBC: CPT | Performed by: STUDENT IN AN ORGANIZED HEALTH CARE EDUCATION/TRAINING PROGRAM

## 2024-05-10 PROCEDURE — 25010000002 PROPOFOL 1000 MG/100ML EMULSION: Performed by: NURSE ANESTHETIST, CERTIFIED REGISTERED

## 2024-05-10 PROCEDURE — 87102 FUNGUS ISOLATION CULTURE: CPT | Performed by: INTERNAL MEDICINE

## 2024-05-10 PROCEDURE — 0B9C8ZX DRAINAGE OF RIGHT UPPER LUNG LOBE, VIA NATURAL OR ARTIFICIAL OPENING ENDOSCOPIC, DIAGNOSTIC: ICD-10-PCS | Performed by: INTERNAL MEDICINE

## 2024-05-10 PROCEDURE — 94664 DEMO&/EVAL PT USE INHALER: CPT

## 2024-05-10 PROCEDURE — 25010000002 DEXAMETHASONE PER 1 MG: Performed by: NURSE ANESTHETIST, CERTIFIED REGISTERED

## 2024-05-10 PROCEDURE — 0202U NFCT DS 22 TRGT SARS-COV-2: CPT | Performed by: INTERNAL MEDICINE

## 2024-05-10 PROCEDURE — 99232 SBSQ HOSP IP/OBS MODERATE 35: CPT | Performed by: NURSE PRACTITIONER

## 2024-05-10 PROCEDURE — 25010000002 ACETAZOLAMIDE PER 500 MG: Performed by: NURSE PRACTITIONER

## 2024-05-10 PROCEDURE — 87206 SMEAR FLUORESCENT/ACID STAI: CPT | Performed by: INTERNAL MEDICINE

## 2024-05-10 PROCEDURE — 71045 X-RAY EXAM CHEST 1 VIEW: CPT

## 2024-05-10 PROCEDURE — 94799 UNLISTED PULMONARY SVC/PX: CPT

## 2024-05-10 PROCEDURE — 87252 VIRUS INOCULATION TISSUE: CPT | Performed by: INTERNAL MEDICINE

## 2024-05-10 PROCEDURE — 84100 ASSAY OF PHOSPHORUS: CPT | Performed by: STUDENT IN AN ORGANIZED HEALTH CARE EDUCATION/TRAINING PROGRAM

## 2024-05-10 PROCEDURE — 07D78ZX EXTRACTION OF THORAX LYMPHATIC, VIA NATURAL OR ARTIFICIAL OPENING ENDOSCOPIC, DIAGNOSTIC: ICD-10-PCS | Performed by: INTERNAL MEDICINE

## 2024-05-10 PROCEDURE — 88108 CYTOPATH CONCENTRATE TECH: CPT | Performed by: INTERNAL MEDICINE

## 2024-05-10 PROCEDURE — 88177 CYTP FNA EVAL EA ADDL: CPT | Performed by: INTERNAL MEDICINE

## 2024-05-10 PROCEDURE — 87205 SMEAR GRAM STAIN: CPT | Performed by: INTERNAL MEDICINE

## 2024-05-10 PROCEDURE — 25010000002 METHYLPREDNISOLONE PER 40 MG: Performed by: STUDENT IN AN ORGANIZED HEALTH CARE EDUCATION/TRAINING PROGRAM

## 2024-05-10 PROCEDURE — 87071 CULTURE AEROBIC QUANT OTHER: CPT | Performed by: INTERNAL MEDICINE

## 2024-05-10 PROCEDURE — 25010000002 FUROSEMIDE PER 20 MG: Performed by: NURSE PRACTITIONER

## 2024-05-10 PROCEDURE — 83735 ASSAY OF MAGNESIUM: CPT | Performed by: STUDENT IN AN ORGANIZED HEALTH CARE EDUCATION/TRAINING PROGRAM

## 2024-05-10 PROCEDURE — 94761 N-INVAS EAR/PLS OXIMETRY MLT: CPT

## 2024-05-10 PROCEDURE — 25810000003 SODIUM CHLORIDE 0.9 % SOLUTION: Performed by: NURSE ANESTHETIST, CERTIFIED REGISTERED

## 2024-05-10 PROCEDURE — 88342 IMHCHEM/IMCYTCHM 1ST ANTB: CPT | Performed by: INTERNAL MEDICINE

## 2024-05-10 PROCEDURE — 25010000002 SUGAMMADEX 200 MG/2ML SOLUTION: Performed by: NURSE ANESTHETIST, CERTIFIED REGISTERED

## 2024-05-10 PROCEDURE — 87116 MYCOBACTERIA CULTURE: CPT | Performed by: INTERNAL MEDICINE

## 2024-05-10 PROCEDURE — 88341 IMHCHEM/IMCYTCHM EA ADD ANTB: CPT | Performed by: INTERNAL MEDICINE

## 2024-05-10 RX ORDER — IPRATROPIUM BROMIDE AND ALBUTEROL SULFATE 2.5; .5 MG/3ML; MG/3ML
3 SOLUTION RESPIRATORY (INHALATION) ONCE
Status: COMPLETED | OUTPATIENT
Start: 2024-05-10 | End: 2024-05-10

## 2024-05-10 RX ORDER — IPRATROPIUM BROMIDE AND ALBUTEROL SULFATE 2.5; .5 MG/3ML; MG/3ML
SOLUTION RESPIRATORY (INHALATION) AS NEEDED
Status: DISCONTINUED | OUTPATIENT
Start: 2024-05-10 | End: 2024-05-10 | Stop reason: SURG

## 2024-05-10 RX ORDER — SODIUM CHLORIDE 9 MG/ML
INJECTION, SOLUTION INTRAVENOUS CONTINUOUS PRN
Status: DISCONTINUED | OUTPATIENT
Start: 2024-05-10 | End: 2024-05-10 | Stop reason: SURG

## 2024-05-10 RX ORDER — ROCURONIUM BROMIDE 10 MG/ML
INJECTION, SOLUTION INTRAVENOUS AS NEEDED
Status: DISCONTINUED | OUTPATIENT
Start: 2024-05-10 | End: 2024-05-10 | Stop reason: SURG

## 2024-05-10 RX ORDER — ONDANSETRON 2 MG/ML
INJECTION INTRAMUSCULAR; INTRAVENOUS AS NEEDED
Status: DISCONTINUED | OUTPATIENT
Start: 2024-05-10 | End: 2024-05-10 | Stop reason: SURG

## 2024-05-10 RX ORDER — LIDOCAINE HYDROCHLORIDE 20 MG/ML
INJECTION, SOLUTION INFILTRATION; PERINEURAL AS NEEDED
Status: DISCONTINUED | OUTPATIENT
Start: 2024-05-10 | End: 2024-05-10 | Stop reason: SURG

## 2024-05-10 RX ORDER — LIDOCAINE 50 MG/G
OINTMENT TOPICAL AS NEEDED
Status: DISCONTINUED | OUTPATIENT
Start: 2024-05-10 | End: 2024-05-10 | Stop reason: HOSPADM

## 2024-05-10 RX ORDER — PROPOFOL 10 MG/ML
INJECTION, EMULSION INTRAVENOUS AS NEEDED
Status: DISCONTINUED | OUTPATIENT
Start: 2024-05-10 | End: 2024-05-10 | Stop reason: SURG

## 2024-05-10 RX ORDER — DEXAMETHASONE SODIUM PHOSPHATE 4 MG/ML
INJECTION, SOLUTION INTRA-ARTICULAR; INTRALESIONAL; INTRAMUSCULAR; INTRAVENOUS; SOFT TISSUE AS NEEDED
Status: DISCONTINUED | OUTPATIENT
Start: 2024-05-10 | End: 2024-05-10 | Stop reason: SURG

## 2024-05-10 RX ADMIN — POTASSIUM CHLORIDE 20 MEQ: 1500 TABLET, EXTENDED RELEASE ORAL at 10:36

## 2024-05-10 RX ADMIN — POTASSIUM CHLORIDE 20 MEQ: 1500 TABLET, EXTENDED RELEASE ORAL at 18:04

## 2024-05-10 RX ADMIN — IPRATROPIUM BROMIDE AND ALBUTEROL SULFATE 3 ML: .5; 3 SOLUTION RESPIRATORY (INHALATION) at 16:14

## 2024-05-10 RX ADMIN — FLUOXETINE 10 MG: 10 CAPSULE ORAL at 10:37

## 2024-05-10 RX ADMIN — IPRATROPIUM BROMIDE AND ALBUTEROL SULFATE 3 ML: .5; 3 SOLUTION RESPIRATORY (INHALATION) at 09:15

## 2024-05-10 RX ADMIN — FUROSEMIDE 40 MG: 10 INJECTION, SOLUTION INTRAMUSCULAR; INTRAVENOUS at 05:29

## 2024-05-10 RX ADMIN — ROCURONIUM 30 MG: 50 INJECTION, SOLUTION INTRAVENOUS at 08:03

## 2024-05-10 RX ADMIN — METOLAZONE 2.5 MG: 2.5 TABLET ORAL at 10:35

## 2024-05-10 RX ADMIN — SUGAMMADEX 200 MG: 100 INJECTION, SOLUTION INTRAVENOUS at 08:39

## 2024-05-10 RX ADMIN — PROPOFOL INJECTABLE EMULSION 150 MG: 10 INJECTION, EMULSION INTRAVENOUS at 08:03

## 2024-05-10 RX ADMIN — ROCURONIUM 20 MG: 50 INJECTION, SOLUTION INTRAVENOUS at 08:26

## 2024-05-10 RX ADMIN — METHYLPREDNISOLONE SODIUM SUCCINATE 40 MG: 40 INJECTION, POWDER, FOR SOLUTION INTRAMUSCULAR; INTRAVENOUS at 10:35

## 2024-05-10 RX ADMIN — FLUOXETINE 20 MG: 20 CAPSULE ORAL at 10:37

## 2024-05-10 RX ADMIN — BUDESONIDE 0.5 MG: 0.5 INHALANT RESPIRATORY (INHALATION) at 18:30

## 2024-05-10 RX ADMIN — LIDOCAINE HYDROCHLORIDE 100 MG: 20 INJECTION, SOLUTION INFILTRATION; PERINEURAL at 08:03

## 2024-05-10 RX ADMIN — METOPROLOL SUCCINATE 25 MG: 25 TABLET, EXTENDED RELEASE ORAL at 21:17

## 2024-05-10 RX ADMIN — FOLIC ACID 1 MG: 1 TABLET ORAL at 10:37

## 2024-05-10 RX ADMIN — PROPOFOL INJECTABLE EMULSION 120 MCG/KG/MIN: 10 INJECTION, EMULSION INTRAVENOUS at 08:06

## 2024-05-10 RX ADMIN — Medication 10 ML: at 21:19

## 2024-05-10 RX ADMIN — DEXAMETHASONE SODIUM PHOSPHATE 4 MG: 4 INJECTION, SOLUTION INTRAMUSCULAR; INTRAVENOUS at 08:03

## 2024-05-10 RX ADMIN — ALPRAZOLAM 1 MG: 1 TABLET ORAL at 15:18

## 2024-05-10 RX ADMIN — METOPROLOL SUCCINATE 25 MG: 25 TABLET, EXTENDED RELEASE ORAL at 10:37

## 2024-05-10 RX ADMIN — Medication 10 ML: at 10:38

## 2024-05-10 RX ADMIN — ALPRAZOLAM 1 MG: 1 TABLET ORAL at 21:17

## 2024-05-10 RX ADMIN — FUROSEMIDE 40 MG: 10 INJECTION, SOLUTION INTRAMUSCULAR; INTRAVENOUS at 15:26

## 2024-05-10 RX ADMIN — ALPRAZOLAM 1 MG: 1 TABLET ORAL at 10:36

## 2024-05-10 RX ADMIN — ONDANSETRON 4 MG: 2 INJECTION INTRAMUSCULAR; INTRAVENOUS at 08:25

## 2024-05-10 RX ADMIN — ACETAZOLAMIDE 250 MG: 500 INJECTION, POWDER, LYOPHILIZED, FOR SOLUTION INTRAVENOUS at 10:41

## 2024-05-10 RX ADMIN — SODIUM CHLORIDE: 9 INJECTION, SOLUTION INTRAVENOUS at 07:59

## 2024-05-10 RX ADMIN — IPRATROPIUM BROMIDE AND ALBUTEROL SULFATE 0.5 MG: .5; 3 SOLUTION RESPIRATORY (INHALATION) at 08:06

## 2024-05-10 RX ADMIN — IPRATROPIUM BROMIDE AND ALBUTEROL SULFATE 3 ML: .5; 3 SOLUTION RESPIRATORY (INHALATION) at 18:30

## 2024-05-10 RX ADMIN — METHYLPREDNISOLONE SODIUM SUCCINATE 40 MG: 40 INJECTION, POWDER, FOR SOLUTION INTRAMUSCULAR; INTRAVENOUS at 21:15

## 2024-05-10 NOTE — PLAN OF CARE
Goal Outcome Evaluation: Pt pleasant throughout day. Had a bronchoscopy with ebus this morning. Pts O2 drop in low 80's while sleeping but currently at 4L HFNC. Walking oximetry prior to d/c. VSS. Care ongoing.

## 2024-05-10 NOTE — PROGRESS NOTES
"PULMONARY CRITICAL CARE PROGRESS  NOTE      PATIENT IDENTIFICATION:  Name: Jeanne King  MRN: [id number]  :  1960     Age: 64 y.o.  Sex: female    DATE OF Note:  5/10/2024   Referring Physician: Tiarra Renee MD                  Subjective:   In bed, resting, no SOB, no chest or abd pain, no bowel or bladder issues       Objective:  tMax 24 hrs: Temp (24hrs), Av.1 °F (36.7 °C), Min:97.6 °F (36.4 °C), Max:98.5 °F (36.9 °C)      Vitals Ranges:   Temp:  [97.6 °F (36.4 °C)-98.5 °F (36.9 °C)] 98.5 °F (36.9 °C)  Heart Rate:  [53-79] 79  Resp:  [13-18] 14  BP: (105-130)/(57-77) 127/75    Intake and Output Last 3 Shifts:   I/O last 3 completed shifts:  In: 1320 [P.O.:1320]  Out: 1400 [Urine:1400]    Exam:  /75 (BP Location: Right arm, Patient Position: Sitting)   Pulse 79   Temp 98.5 °F (36.9 °C) (Oral)   Resp 14   Ht 172.7 cm (68\")   Wt 96.2 kg (212 lb)   SpO2 92%   BMI 32.23 kg/m²     General Appearance:  AAO   HEENT:  Normocephalic, without obvious abnormality. Conjunctivae/corneas clear.  Normal external ear canals. Nares normal, no drainage     Neck:  Supple, symmetrical, trachea midline. No JVD.  Lungs /Chest wall:   Bilateral basal rhonchi, respirations unlabored, symmetrical wall movement.     Heart:  Regular rate and rhythm, systolic murmur PMI left sternal border  Abdomen: Soft, nontender, no masses, no organomegaly.    Extremities: Trace edema, no clubbing or cyanosis        Medications:  acetaZOLAMIDE (DIAMOX) 250 mg in sterile water (preservative free) 2.5 mL injection, 250 mg, Intravenous, Daily  ALPRAZolam, 1 mg, Oral, TID  budesonide, 0.5 mg, Nebulization, BID - RT  FLUoxetine, 10 mg, Oral, Daily  FLUoxetine, 20 mg, Oral, Daily  folic acid, 1 mg, Oral, Daily  furosemide, 40 mg, Intravenous, Q12H  ipratropium-albuterol, 3 mL, Nebulization, 4x Daily - RT  methylPREDNISolone sodium succinate, 40 mg, Intravenous, Q12H  metOLazone, 2.5 mg, Oral, Daily  metoprolol succinate XL, " 25 mg, Oral, Q12H  nicotine, 1 patch, Transdermal, Q24H  potassium chloride, 20 mEq, Oral, BID With Meals  sodium chloride, 10 mL, Intravenous, Q12H        Infusion:        PRN:    acetaminophen    senna-docusate sodium **AND** polyethylene glycol **AND** bisacodyl **AND** bisacodyl    ipratropium-albuterol    nitroglycerin    ondansetron    sodium chloride    sodium chloride  Data Review:  All labs (24hrs):   Recent Results (from the past 24 hour(s))   Comprehensive Metabolic Panel    Collection Time: 05/10/24  5:51 AM    Specimen: Arm, Left; Blood   Result Value Ref Range    Glucose 123 (H) 65 - 99 mg/dL    BUN 27 (H) 8 - 23 mg/dL    Creatinine 0.71 0.57 - 1.00 mg/dL    Sodium 137 136 - 145 mmol/L    Potassium 5.0 3.5 - 5.2 mmol/L    Chloride 94 (L) 98 - 107 mmol/L    CO2 36.0 (H) 22.0 - 29.0 mmol/L    Calcium 9.2 8.6 - 10.5 mg/dL    Total Protein 6.4 6.0 - 8.5 g/dL    Albumin 3.4 (L) 3.5 - 5.2 g/dL    ALT (SGPT) 45 (H) 1 - 33 U/L    AST (SGOT) 36 (H) 1 - 32 U/L    Alkaline Phosphatase 92 39 - 117 U/L    Total Bilirubin 0.9 0.0 - 1.2 mg/dL    Globulin 3.0 gm/dL    A/G Ratio 1.1 g/dL    BUN/Creatinine Ratio 38.0 (H) 7.0 - 25.0    Anion Gap 7.0 5.0 - 15.0 mmol/L    eGFR 94.5 >60.0 mL/min/1.73   Magnesium    Collection Time: 05/10/24  5:51 AM    Specimen: Arm, Left; Blood   Result Value Ref Range    Magnesium 1.9 1.6 - 2.4 mg/dL   Phosphorus    Collection Time: 05/10/24  5:51 AM    Specimen: Arm, Left; Blood   Result Value Ref Range    Phosphorus 3.5 2.5 - 4.5 mg/dL   CBC Auto Differential    Collection Time: 05/10/24  5:51 AM    Specimen: Arm, Left; Blood   Result Value Ref Range    WBC 10.46 3.40 - 10.80 10*3/mm3    RBC 6.21 (H) 3.77 - 5.28 10*6/mm3    Hemoglobin 18.1 (H) 12.0 - 15.9 g/dL    Hematocrit 59.8 (H) 34.0 - 46.6 %    MCV 96.3 79.0 - 97.0 fL    MCH 29.1 26.6 - 33.0 pg    MCHC 30.3 (L) 31.5 - 35.7 g/dL    RDW 16.2 (H) 12.3 - 15.4 %    RDW-SD 56.1 (H) 37.0 - 54.0 fl    MPV 10.8 6.0 - 12.0 fL    Platelets  124 (L) 140 - 450 10*3/mm3    Neutrophil % 84.1 (H) 42.7 - 76.0 %    Lymphocyte % 9.5 (L) 19.6 - 45.3 %    Monocyte % 5.8 5.0 - 12.0 %    Eosinophil % 0.0 (L) 0.3 - 6.2 %    Basophil % 0.1 0.0 - 1.5 %    Immature Grans % 0.5 0.0 - 0.5 %    Neutrophils, Absolute 8.80 (H) 1.70 - 7.00 10*3/mm3    Lymphocytes, Absolute 0.99 0.70 - 3.10 10*3/mm3    Monocytes, Absolute 0.61 0.10 - 0.90 10*3/mm3    Eosinophils, Absolute 0.00 0.00 - 0.40 10*3/mm3    Basophils, Absolute 0.01 0.00 - 0.20 10*3/mm3    Immature Grans, Absolute 0.05 0.00 - 0.05 10*3/mm3    nRBC 0.0 0.0 - 0.2 /100 WBC        Imaging:  CT Outside Films  This procedure was auto-finalized with no dictation required.  XR Outside Films  This procedure was auto-finalized with no dictation required.       ASSESSMENT:  Acute hypoxic respiratory failure   AE COPD  Hx COVID-19   RLL spiculated mass lesion    HTN  Tobacco abuse  Obesity  Anxiety     PLAN:   EBUS today  Encourage OOB during day   Bronchodilators  Inhaled corticosteroids  IV steroids   Incentive spirometer  Nicotine patch   Electrolytes/ glycemic control  No DVT prophylaxis     Discussed with Dr Shayla Guadalupe, FRANCIS   5/10/2024  08:02 EDT    I personally have examined  and interviewed the patient. I have reviewed the history, data, problems, assessment and plan with our NP.  Total Critical care time in direct medical management (   ) minutes, This time specifically excludes time spent performing procedures.    Bessy Mcguire MD   5/10/2024  23:14 EDT

## 2024-05-10 NOTE — PROGRESS NOTES
[facility] [place]        LOS:  LOS: 3 days   Patient Name: Jeanne King  Age/Sex: 64 y.o. female  : 1960  MRN: [id number]    Day of Service: 05/10/24   Length of Stay: 3  Encounter Provider: FRANCIS Wills  Place of Service: [facility] CARDIOLOGY  Patient Care Team:  Provider, No Known as PCP - General    Subjective:     Chief Complaint: f/u shortness of breath / edema    Subjective: patient remains on supplemental O2, went for bronch today, metolazone added    Current Medications:   Scheduled Meds:acetaZOLAMIDE (DIAMOX) 250 mg in sterile water (preservative free) 2.5 mL injection, 250 mg, Intravenous, Daily  ALPRAZolam, 1 mg, Oral, TID  budesonide, 0.5 mg, Nebulization, BID - RT  FLUoxetine, 10 mg, Oral, Daily  FLUoxetine, 20 mg, Oral, Daily  folic acid, 1 mg, Oral, Daily  furosemide, 40 mg, Intravenous, Q12H  ipratropium-albuterol, 3 mL, Nebulization, 4x Daily - RT  methylPREDNISolone sodium succinate, 40 mg, Intravenous, Q12H  metOLazone, 2.5 mg, Oral, Daily  metoprolol succinate XL, 25 mg, Oral, Q12H  nicotine, 1 patch, Transdermal, Q24H  potassium chloride, 20 mEq, Oral, BID With Meals  sodium chloride, 10 mL, Intravenous, Q12H      Continuous Infusions:     Allergies:  Allergies   Allergen Reactions    Keflex [Cephalexin] Mental Status Change and Provider Review Needed    Morphine Mental Status Change    Premarin [Conjugated Estrogens] Mental Status Change       Review of Systems   Constitutional: Positive for malaise/fatigue. Negative for chills and diaphoresis.   Cardiovascular:  Positive for dyspnea on exertion and leg swelling. Negative for chest pain, irregular heartbeat, near-syncope, orthopnea, palpitations, paroxysmal nocturnal dyspnea and syncope.   Respiratory:  Negative for cough, shortness of breath, sleep disturbances due to breathing and sputum production.    Gastrointestinal:  Negative for change in bowel habit.   Genitourinary:  Negative for  urgency.   Neurological:  Negative for dizziness and headaches.   Psychiatric/Behavioral:  Negative for altered mental status.          Objective:     Temp:  [97.6 °F (36.4 °C)-98.5 °F (36.9 °C)] 97.7 °F (36.5 °C)  Heart Rate:  [] 57  Resp:  [10-19] 12  BP: (105-128)/(57-75) 128/75     Intake/Output Summary (Last 24 hours) at 5/10/2024 1127  Last data filed at 5/10/2024 0853  Gross per 24 hour   Intake 1200 ml   Output 600 ml   Net 600 ml     Body mass index is 32.23 kg/m².      05/09/24  0500 05/10/24  0500 05/10/24  0743   Weight: 99 kg (218 lb 4.1 oz) 102 kg (224 lb 13.9 oz) 96.2 kg (212 lb)         General Appearance:    Alert, cooperative, in no acute distress                                Head: Atraumatic, normocephalic, PERRLA               Neck:   supple, no JVD   Lungs:     Supplemental O2, dim bilat bases    Heart:    Regular rhythm and normal rate, normal S1 and S2   Abdomen:     Normal bowel sounds, no masses, no organomegaly, soft  nontender, nondistended, no guarding, no rebound  tenderness   Extremities:   Moves all extremities well, edema, no cyanosis, no  redness   Pulses:   Pulses palpable and equal bilaterally   Skin:   No bleeding, bruising or rash   Neurologic:   Awake, alert, oriented x3         Lab Review:   Results from last 7 days   Lab Units 05/10/24  0551 05/09/24  0308 05/08/24  0618 05/07/24  0419   SODIUM mmol/L 137 142   < > 140   POTASSIUM mmol/L 5.0 4.6   < > 3.8   CHLORIDE mmol/L 94* 98   < > 99   CO2 mmol/L 36.0* 38.0*   < > 31.0*   BUN mg/dL 27* 23   < > 12   CREATININE mg/dL 0.71 0.71   < > 0.59   GLUCOSE mg/dL 123* 154*   < > 102*   CALCIUM mg/dL 9.2 8.8   < > 8.3*   AST (SGOT) U/L 36*  --   --  19   ALT (SGPT) U/L 45*  --   --  16    < > = values in this interval not displayed.     Results from last 7 days   Lab Units 05/08/24  1016 05/08/24  0618   HSTROP T ng/L 22* 19*     Results from last 7 days   Lab Units 05/10/24  0551 05/09/24  0308   WBC 10*3/mm3 10.46 9.84    HEMOGLOBIN g/dL 18.1* 18.2*   HEMATOCRIT % 59.8* 61.9*   PLATELETS 10*3/mm3 124* 142         Results from last 7 days   Lab Units 05/10/24  0551 05/09/24  0308   MAGNESIUM mg/dL 1.9 1.9     Results from last 7 days   Lab Units 05/07/24 0419   CHOLESTEROL mg/dL 81   TRIGLYCERIDES mg/dL 60   HDL CHOL mg/dL 34*     Results from last 7 days   Lab Units 05/07/24 0419   PROBNP pg/mL 1,213.0*     Results from last 7 days   Lab Units 05/07/24 0419   TSH uIU/mL 0.611       Recent Radiology:  Imaging Results (Most Recent)       Procedure Component Value Units Date/Time    XR Chest 1 View [[id number]] Collected: 05/10/24 0920     Updated: 05/10/24 0924    Narrative:      XR CHEST 1 VW    Date of Exam: 5/10/2024 9:11 AM EDT    Indication: post op    Comparison: None available.    Findings:  Cardiac silhouette is enlarged. Pulmonary vasculature is indistinct. Scattered bibasilar atelectasis is present. No significant pleural effusion or pneumothorax. No acute osseous lesion is seen.      Impression:      Impression:  1.Cardiomegaly with pulmonary vascular congestion and scattered bibasilar atelectasis.      Electronically Signed: Frankie Matson MD    5/10/2024 9:22 AM EDT    Workstation ID: [id number]    CT Outside Films [[id number]] Resulted: 05/08/24 0935     Updated: 05/08/24 0935    Narrative:      This procedure was auto-finalized with no dictation required.    XR Outside Films [[id number]] Resulted: 05/08/24 0935     Updated: 05/08/24 0935    Narrative:      This procedure was auto-finalized with no dictation required.    US Liver [[id number]] Collected: 05/07/24 0731     Updated: 05/07/24 0735    Narrative:      DATE OF EXAM:  5/7/2024 4:35 AM     PROCEDURE:  US LIVER-     INDICATIONS:  ascites, eval for cirrhosis     COMPARISON:  No Comparisons Available     TECHNIQUE:   Grayscale and color Doppler ultrasound evaluation of the limited abdomen  was performed.     FINDINGS:  The liver is enlarged, 20.0 cm in length. The  "liver echotexture is  coarsened. There is a subtle nodular surface contour of the liver  suggestive of cirrhosis. No focal liver lesion is identified. There is a  \"starry eric\" pattern of the liver parenchyma, which may also reflect  changes of chronic liver disease. No ascites is evident. Main portal  vein is patent with hepatopetal flow. The imaged hepatic veins are  patent. Common bile duct caliber is within normal limits, 6 mm. No  intrahepatic biliary ductal dilation is observed. The intrahepatic IVC  demonstrates color flow.       Impression:      1. Hepatomegaly.  2. Subtle nodular surface contour of the liver with mildly coarsened  echotexture, raising the possibility of cirrhosis.  3. No focal liver lesion is seen.  4. No ascites is seen.     Electronically Signed By-Jane Woodruff MD On:5/7/2024 7:33 AM               ECHOCARDIOGRAM:    Results for orders placed during the hospital encounter of 05/07/24    Adult Transthoracic Echo Complete W/ Cont if Necessary Per Protocol    Interpretation Summary    Left ventricular systolic function is normal. Left ventricular ejection fraction appears to be 56 - 60%.    Left ventricular diastolic function is consistent with (grade I) impaired relaxation.    Mildly reduced right ventricular systolic function noted.    The right ventricular cavity is moderately dilated.    Estimated right ventricular systolic pressure from tricuspid regurgitation is normal (<35 mmHg).    IVC is dilated        I reviewed the patient's new clinical results.    EKG:      Assessment:       Acute hypoxic respiratory failure    Lung mass    Acute hypoxic and hypercapnic respiratory insufficiency  Volume overload / HFpEF / RV dysfunction  HTN  Tobacco abuse  Abnormal CT at OSH with Concern for primary lung neoplasm with 2 lung lesions identified with mediastinal and hilar lymphadenopathy and cirrhotic changes of liver  Polycythemia       Plan:   Patient admitted with shortness of breath, lower " extremity edema, long term smoker, has not seen physician in a number of years, [facility] at [facility] with concern for lung neoplasm and hilar lymphadenopathy  She is overloaded, continue IV diuresis- added diamox for alkalosis and metolazone added  Stress test as outpt once improves from lung standpoint / on less O2  Needs SHANKAR work up as outpt  Bronch today given high O2 requirements          Rosmery Dunn, FRANCIS  05/10/24  11:27 EDT

## 2024-05-10 NOTE — PROGRESS NOTES
[facility] MEDICINE SERVICE  DAILY PROGRESS NOTE    NAME: Jeanne King  : 1960  MRN: [id number]      LOS: 3 days     PROVIDER OF SERVICE: Bon Dunn MD    Chief Complaint: Acute hypoxic respiratory failure    Subjective:     Interval History:  History taken from: patient  Patient Complaints: Sob improving; s/p EBUS    Patient Denies: Nausea or vomiting    Review of Systems:   Review of Systems  14 point review of system unremarkable except mentioned above  Objective:     Vital Signs  Temp:  [97.6 °F (36.4 °C)-98.5 °F (36.9 °C)] 97.7 °F (36.5 °C)  Heart Rate:  [] 57  Resp:  [10-19] 12  BP: (105-128)/(57-75) 128/75  Flow (L/min):  [4-15] 4   Body mass index is 32.23 kg/m².    Physical Exam  Physical Exam  HENT:      Head: Atraumatic.      Mouth/Throat:      Mouth: Mucous membranes are moist.   Eyes:      Pupils: Pupils are equal, round, and reactive to light.   Cardiovascular:      Rate and Rhythm: Normal rate and regular rhythm.      Pulses: Normal pulses.      Heart sounds: Normal heart sounds.   Abdominal:      General: Bowel sounds are normal.      Palpations: Abdomen is soft.   Musculoskeletal:      Right lower leg: Edema present.      Left lower leg: Edema present.   Skin:     General: Skin is warm.   Neurological:      General: No focal deficit present.      Mental Status: She is alert and oriented to person, place, and time.   Psychiatric:         Mood and Affect: Mood normal.         Scheduled Meds   acetaZOLAMIDE (DIAMOX) 250 mg in sterile water (preservative free) 2.5 mL injection, 250 mg, Intravenous, Daily  ALPRAZolam, 1 mg, Oral, TID  budesonide, 0.5 mg, Nebulization, BID - RT  FLUoxetine, 10 mg, Oral, Daily  FLUoxetine, 20 mg, Oral, Daily  folic acid, 1 mg, Oral, Daily  furosemide, 40 mg, Intravenous, Q12H  ipratropium-albuterol, 3 mL, Nebulization, 4x Daily - RT  methylPREDNISolone sodium succinate, 40 mg, Intravenous, Q12H  metOLazone, 2.5 mg, Oral, Daily  metoprolol  succinate XL, 25 mg, Oral, Q12H  nicotine, 1 patch, Transdermal, Q24H  potassium chloride, 20 mEq, Oral, BID With Meals  sodium chloride, 10 mL, Intravenous, Q12H       PRN Meds     acetaminophen    senna-docusate sodium **AND** polyethylene glycol **AND** bisacodyl **AND** bisacodyl    ipratropium-albuterol    nitroglycerin    ondansetron    sodium chloride    sodium chloride   Infusions         Diagnostic Data    Results from last 7 days   Lab Units 05/10/24  0551   WBC 10*3/mm3 10.46   HEMOGLOBIN g/dL 18.1*   HEMATOCRIT % 59.8*   PLATELETS 10*3/mm3 124*   GLUCOSE mg/dL 123*   CREATININE mg/dL 0.71   BUN mg/dL 27*   SODIUM mmol/L 137   POTASSIUM mmol/L 5.0   AST (SGOT) U/L 36*   ALT (SGPT) U/L 45*   ALK PHOS U/L 92   BILIRUBIN mg/dL 0.9   ANION GAP mmol/L 7.0       XR Chest 1 View    Result Date: 5/10/2024  Impression: 1.Cardiomegaly with pulmonary vascular congestion and scattered bibasilar atelectasis. Electronically Signed: Frankie Matson MD  5/10/2024 9:22 AM EDT  Workstation ID: [id number]       I reviewed the patient's new clinical results.    Assessment/Plan:     Active and Resolved Problems  #Acute hypoxemic respiratory failure  #Acute exacerbation of likely diastolic heart failure  #Acute pulmonary edema  # suspicious Malignant lung neoplasm and mediastinal   Lymphadenopathy  #Bilateral lower extremity edema likely from above  -Continue to supplement oxygen to keep saturation above 90 to 92%  - CT of the chest report as below  1. No pulmonary emboli 2. Bilateral rond glass patchy opacities nonspecific suggestion of pulmonary edema.  A right lower lobe 2.3 cm spiculated mass lesion, another more distal lesion 1.7 cm.  These lesions are highly concerning for primary lung neoplasm and recommend biopsy or PET/CT exam.  Lingular atelectasis.  No pneumothorax or pleural effusion.  Mediastinal and hilar lymphadenopathy.  #3 cardiomegaly and coronary artery calcifications.  #4 cirrhotic enlarged liver.   Splenomegaly.  Abdominal ascites.  - afebrile. Negative for cough or fever. Negative leukocytosis. Does not appear to have pneumonia     Continue IV Lasix 40 mg twice daily  - Transthoracic echocardiogram  noted ; out pt ischemic workup   -cardiology team consulted   - s/p EBUS; await lab tests results      Liver cirrhosis     -Ultrasound noted for subtotal nodular surface contour of the liver with mildly coarsened echotexture raising the possibility of cirrhosis and no focal as well as ascites seen  -HCC  Screening     #Erythrocytosis  -Hematocrit 59.9  - Serum EPO level     Essential hypertension  - Continue to monitor  - Continue Norvasc     #History of anxiety-continue Xanax and Protonix     #COPD  -Inhalers bronchodilators and IV steroids     Obesity BMI 32.82  - Lifestyle and dietary management        DVT prophylaxis:  Mechanical DVT prophylaxis orders are present.         Code status is   Code Status and Medical Interventions:   Ordered at: 05/07/24 0243     Level Of Support Discussed With:    Patient     Code Status (Patient has no pulse and is not breathing):    CPR (Attempt to Resuscitate)     Medical Interventions (Patient has pulse or is breathing):    Full Support       Plan for disposition: home  in 1-2  days, currently requires IV  diuresis    Time: 35 minutes    Signature: Electronically signed by Bon Dunn MD, 05/10/24, 15:02 EDT.  [facility] [place] Hospitalist Team

## 2024-05-10 NOTE — NURSING NOTE
Pt started on lasix, got up to go to the bathroom and began to urinate on herself. She slipped on the urine and fell down scrapping her back on the computer on wheels. Pt reported no pain. Vitals are stable and unchanged from before the fall. She had a bronchoscopy this morning at 0800. Pt was not a falls risk prior to this fall. Pt assessed head to toe.

## 2024-05-10 NOTE — CASE MANAGEMENT/SOCIAL WORK
Continued Stay Note  SAAD Brewer     Patient Name: Jeanne King  MRN: [id number]  Today's Date: 5/10/2024    Admit Date: 5/7/2024    Plan: Home alone. Watch for O2 needs.   Discharge Plan       Row Name 05/10/24 9337       Plan    Plan Home alone. Watch for O2 needs.    Patient/Family in Agreement with Plan yes    Plan Comments Barriers to discharge: Bronch 5/10.  Weaned form 15L to 4L O2.  IV lasix.  PO steroids.  Pending cultures. Plan stress test OP             Expected Discharge Date and Time       Expected Discharge Date Expected Discharge Time    May 11, 2024           Yolanda Medina RN    Office Phone [phone]  Office Cell [phone]       The patient had an appointment scheduled for 03/23/2021 at 11:30 am with Dr. Garcia. The appointment was canceled via Patient Portal on 03/22/2021 at 9:02 am.    Reason for Cancellation: Inconvenient Time/Day for Patient     Patient Comments: Can we please reschedule this appointment? My wife broke her ankle and she has an orthopedic appointment at the same time. Please advise. I apologize for the last minute notice. Thank you.     More than 24-hour notice was given for cancellation.    Next Appointment: None scheduled - added to wait list    After review of encounter, encounter may be closed.

## 2024-05-11 ENCOUNTER — APPOINTMENT (OUTPATIENT)
Dept: GENERAL RADIOLOGY | Facility: HOSPITAL | Age: 64
DRG: 189 | End: 2024-05-11
Payer: MEDICARE

## 2024-05-11 LAB
ANION GAP SERPL CALCULATED.3IONS-SCNC: 4 MMOL/L (ref 5–15)
ARTERIAL PATENCY WRIST A: POSITIVE
ATMOSPHERIC PRESS: ABNORMAL MM[HG]
BASE EXCESS BLDA CALC-SCNC: 9.4 MMOL/L (ref 0–3)
BDY SITE: ABNORMAL
BUN SERPL-MCNC: 28 MG/DL (ref 8–23)
BUN/CREAT SERPL: 39.4 (ref 7–25)
CA-I BLDA-SCNC: 1.22 MMOL/L (ref 1.15–1.33)
CALCIUM SPEC-SCNC: 9.3 MG/DL (ref 8.6–10.5)
CHLORIDE SERPL-SCNC: 92 MMOL/L (ref 98–107)
CO2 SERPL-SCNC: 40 MMOL/L (ref 22–29)
CREAT BLDA-MCNC: 0.73 MG/DL (ref 0.6–1.3)
CREAT SERPL-MCNC: 0.71 MG/DL (ref 0.57–1)
D-LACTATE SERPL-SCNC: 1.7 MMOL/L (ref 0.5–2)
D-LACTATE SERPL-SCNC: 2.2 MMOL/L (ref 0.2–2)
D-LACTATE SERPL-SCNC: 2.3 MMOL/L (ref 0.5–2)
DEPRECATED RDW RBC AUTO: 56.7 FL (ref 37–54)
EGFRCR SERPLBLD CKD-EPI 2021: 91.4 ML/MIN/1.73
EGFRCR SERPLBLD CKD-EPI 2021: 94.5 ML/MIN/1.73
ERYTHROCYTE [DISTWIDTH] IN BLOOD BY AUTOMATED COUNT: 15.8 % (ref 12.3–15.4)
GLUCOSE BLDC GLUCOMTR-MCNC: 119 MG/DL (ref 70–105)
GLUCOSE BLDC GLUCOMTR-MCNC: 132 MG/DL (ref 70–105)
GLUCOSE BLDC GLUCOMTR-MCNC: 152 MG/DL (ref 70–105)
GLUCOSE BLDC GLUCOMTR-MCNC: 156 MG/DL (ref 70–105)
GLUCOSE BLDC GLUCOMTR-MCNC: 210 MG/DL (ref 74–100)
GLUCOSE BLDC GLUCOMTR-MCNC: 210 MG/DL (ref 74–100)
GLUCOSE SERPL-MCNC: 235 MG/DL (ref 65–99)
HCO3 BLDA-SCNC: 42.1 MMOL/L (ref 21–28)
HCT VFR BLD AUTO: 58.4 % (ref 34–46.6)
HCT VFR BLDA CALC: 61 % (ref 38–51)
HEMODILUTION: NO
HGB BLD-MCNC: 17.5 G/DL (ref 12–15.9)
HGB BLDA-MCNC: 20.6 G/DL (ref 12–17)
INHALED O2 CONCENTRATION: 32 %
MCH RBC QN AUTO: 29.3 PG (ref 26.6–33)
MCHC RBC AUTO-ENTMCNC: 30 G/DL (ref 31.5–35.7)
MCV RBC AUTO: 97.8 FL (ref 79–97)
MODALITY: ABNORMAL
PCO2 BLDA: 87.6 MM HG (ref 35–48)
PH BLDA: 7.29 PH UNITS (ref 7.35–7.45)
PLATELET # BLD AUTO: 105 10*3/MM3 (ref 140–450)
PMV BLD AUTO: 10.4 FL (ref 6–12)
PO2 BLDA: 62.5 MM HG (ref 83–108)
POTASSIUM BLDA-SCNC: 3.8 MMOL/L (ref 3.5–4.5)
POTASSIUM SERPL-SCNC: 4.1 MMOL/L (ref 3.5–5.2)
RBC # BLD AUTO: 5.97 10*6/MM3 (ref 3.77–5.28)
SAO2 % BLDCOA: 86.7 % (ref 94–98)
SODIUM BLD-SCNC: 138 MMOL/L (ref 138–146)
SODIUM SERPL-SCNC: 136 MMOL/L (ref 136–145)
WBC NRBC COR # BLD AUTO: 8.42 10*3/MM3 (ref 3.4–10.8)

## 2024-05-11 PROCEDURE — 82330 ASSAY OF CALCIUM: CPT

## 2024-05-11 PROCEDURE — 25010000002 METHYLPREDNISOLONE PER 40 MG: Performed by: STUDENT IN AN ORGANIZED HEALTH CARE EDUCATION/TRAINING PROGRAM

## 2024-05-11 PROCEDURE — 25010000002 FUROSEMIDE PER 20 MG: Performed by: NURSE PRACTITIONER

## 2024-05-11 PROCEDURE — 80048 BASIC METABOLIC PNL TOTAL CA: CPT | Performed by: PHYSICIAN ASSISTANT

## 2024-05-11 PROCEDURE — 83605 ASSAY OF LACTIC ACID: CPT

## 2024-05-11 PROCEDURE — 82565 ASSAY OF CREATININE: CPT

## 2024-05-11 PROCEDURE — 85018 HEMOGLOBIN: CPT

## 2024-05-11 PROCEDURE — 94799 UNLISTED PULMONARY SVC/PX: CPT

## 2024-05-11 PROCEDURE — 82948 REAGENT STRIP/BLOOD GLUCOSE: CPT

## 2024-05-11 PROCEDURE — 82803 BLOOD GASES ANY COMBINATION: CPT

## 2024-05-11 PROCEDURE — 63710000001 INSULIN LISPRO (HUMAN) PER 5 UNITS: Performed by: STUDENT IN AN ORGANIZED HEALTH CARE EDUCATION/TRAINING PROGRAM

## 2024-05-11 PROCEDURE — 25810000003 SODIUM CHLORIDE 0.9 % SOLUTION: Performed by: PHYSICIAN ASSISTANT

## 2024-05-11 PROCEDURE — 94664 DEMO&/EVAL PT USE INHALER: CPT

## 2024-05-11 PROCEDURE — 71045 X-RAY EXAM CHEST 1 VIEW: CPT

## 2024-05-11 PROCEDURE — 82948 REAGENT STRIP/BLOOD GLUCOSE: CPT | Performed by: STUDENT IN AN ORGANIZED HEALTH CARE EDUCATION/TRAINING PROGRAM

## 2024-05-11 PROCEDURE — 99232 SBSQ HOSP IP/OBS MODERATE 35: CPT | Performed by: INTERNAL MEDICINE

## 2024-05-11 PROCEDURE — 80051 ELECTROLYTE PANEL: CPT

## 2024-05-11 PROCEDURE — 85027 COMPLETE CBC AUTOMATED: CPT | Performed by: PHYSICIAN ASSISTANT

## 2024-05-11 PROCEDURE — 94760 N-INVAS EAR/PLS OXIMETRY 1: CPT

## 2024-05-11 PROCEDURE — 25010000002 ACETAZOLAMIDE PER 500 MG: Performed by: NURSE PRACTITIONER

## 2024-05-11 PROCEDURE — 36600 WITHDRAWAL OF ARTERIAL BLOOD: CPT

## 2024-05-11 PROCEDURE — 94761 N-INVAS EAR/PLS OXIMETRY MLT: CPT

## 2024-05-11 PROCEDURE — 94660 CPAP INITIATION&MGMT: CPT

## 2024-05-11 RX ORDER — DEXTROSE MONOHYDRATE 25 G/50ML
25 INJECTION, SOLUTION INTRAVENOUS
Status: DISCONTINUED | OUTPATIENT
Start: 2024-05-11 | End: 2024-05-14 | Stop reason: HOSPADM

## 2024-05-11 RX ORDER — FUROSEMIDE 10 MG/ML
40 INJECTION INTRAMUSCULAR; INTRAVENOUS DAILY
Status: DISCONTINUED | OUTPATIENT
Start: 2024-05-12 | End: 2024-05-12

## 2024-05-11 RX ORDER — INSULIN LISPRO 100 [IU]/ML
2-7 INJECTION, SOLUTION INTRAVENOUS; SUBCUTANEOUS
Status: DISCONTINUED | OUTPATIENT
Start: 2024-05-11 | End: 2024-05-14 | Stop reason: HOSPADM

## 2024-05-11 RX ORDER — NICOTINE POLACRILEX 4 MG
15 LOZENGE BUCCAL
Status: DISCONTINUED | OUTPATIENT
Start: 2024-05-11 | End: 2024-05-14 | Stop reason: HOSPADM

## 2024-05-11 RX ORDER — ALPRAZOLAM 1 MG/1
1 TABLET ORAL 3 TIMES DAILY PRN
Status: DISCONTINUED | OUTPATIENT
Start: 2024-05-11 | End: 2024-05-14 | Stop reason: HOSPADM

## 2024-05-11 RX ORDER — IBUPROFEN 600 MG/1
1 TABLET ORAL
Status: DISCONTINUED | OUTPATIENT
Start: 2024-05-11 | End: 2024-05-14 | Stop reason: HOSPADM

## 2024-05-11 RX ADMIN — IPRATROPIUM BROMIDE AND ALBUTEROL SULFATE 3 ML: .5; 3 SOLUTION RESPIRATORY (INHALATION) at 18:12

## 2024-05-11 RX ADMIN — FLUOXETINE 10 MG: 10 CAPSULE ORAL at 08:24

## 2024-05-11 RX ADMIN — INSULIN LISPRO 2 UNITS: 100 INJECTION, SOLUTION INTRAVENOUS; SUBCUTANEOUS at 20:35

## 2024-05-11 RX ADMIN — METHYLPREDNISOLONE SODIUM SUCCINATE 40 MG: 40 INJECTION, POWDER, FOR SOLUTION INTRAMUSCULAR; INTRAVENOUS at 09:15

## 2024-05-11 RX ADMIN — Medication 10 ML: at 08:25

## 2024-05-11 RX ADMIN — SODIUM CHLORIDE 250 ML: 9 INJECTION, SOLUTION INTRAVENOUS at 05:19

## 2024-05-11 RX ADMIN — Medication 10 ML: at 20:38

## 2024-05-11 RX ADMIN — METOPROLOL SUCCINATE 25 MG: 25 TABLET, EXTENDED RELEASE ORAL at 08:23

## 2024-05-11 RX ADMIN — Medication 10 ML: at 04:17

## 2024-05-11 RX ADMIN — POTASSIUM CHLORIDE 20 MEQ: 1500 TABLET, EXTENDED RELEASE ORAL at 08:24

## 2024-05-11 RX ADMIN — BUDESONIDE 0.5 MG: 0.5 INHALANT RESPIRATORY (INHALATION) at 06:38

## 2024-05-11 RX ADMIN — BUDESONIDE 0.5 MG: 0.5 INHALANT RESPIRATORY (INHALATION) at 18:12

## 2024-05-11 RX ADMIN — Medication 10 ML: at 04:06

## 2024-05-11 RX ADMIN — ALPRAZOLAM 1 MG: 1 TABLET ORAL at 08:23

## 2024-05-11 RX ADMIN — INSULIN LISPRO 2 UNITS: 100 INJECTION, SOLUTION INTRAVENOUS; SUBCUTANEOUS at 08:24

## 2024-05-11 RX ADMIN — ACETAZOLAMIDE 250 MG: 500 INJECTION, POWDER, LYOPHILIZED, FOR SOLUTION INTRAVENOUS at 08:21

## 2024-05-11 RX ADMIN — IPRATROPIUM BROMIDE AND ALBUTEROL SULFATE 3 ML: .5; 3 SOLUTION RESPIRATORY (INHALATION) at 15:11

## 2024-05-11 RX ADMIN — FUROSEMIDE 40 MG: 10 INJECTION, SOLUTION INTRAMUSCULAR; INTRAVENOUS at 04:06

## 2024-05-11 RX ADMIN — Medication 10 ML: at 21:17

## 2024-05-11 RX ADMIN — POTASSIUM CHLORIDE 20 MEQ: 1500 TABLET, EXTENDED RELEASE ORAL at 17:33

## 2024-05-11 RX ADMIN — METOPROLOL SUCCINATE 25 MG: 25 TABLET, EXTENDED RELEASE ORAL at 20:39

## 2024-05-11 RX ADMIN — Medication 10 ML: at 05:22

## 2024-05-11 RX ADMIN — METOLAZONE 2.5 MG: 2.5 TABLET ORAL at 08:23

## 2024-05-11 RX ADMIN — FLUOXETINE 20 MG: 20 CAPSULE ORAL at 08:24

## 2024-05-11 RX ADMIN — METHYLPREDNISOLONE SODIUM SUCCINATE 40 MG: 40 INJECTION, POWDER, FOR SOLUTION INTRAMUSCULAR; INTRAVENOUS at 21:16

## 2024-05-11 RX ADMIN — IPRATROPIUM BROMIDE AND ALBUTEROL SULFATE 3 ML: .5; 3 SOLUTION RESPIRATORY (INHALATION) at 10:31

## 2024-05-11 RX ADMIN — IPRATROPIUM BROMIDE AND ALBUTEROL SULFATE 3 ML: .5; 3 SOLUTION RESPIRATORY (INHALATION) at 06:38

## 2024-05-11 RX ADMIN — FOLIC ACID 1 MG: 1 TABLET ORAL at 08:23

## 2024-05-11 NOTE — NURSING NOTE
Pt's mentation assessed. Pt can appropriately state her name, , what type of facility she is in, why she went to the hospital.

## 2024-05-11 NOTE — PROCEDURES
Bronchoscopy Procedure Note    Name: Jeanne King   Age:  64 y.o.   Sex: female  :  1960  MRN: [id number]  Time of procedure: 23:24 EDT      Date of Operation: 5/10/2024     Pre-op Diagnosis: lung mass    Post-op Diagnosis: same    Surgeon: Bessy Mcguire MD    Anesthesia: per anesthesia Sedation    Operation: Flexible fiberoptic bronchoscopy, diagnostic bronchoscope  Findings: lung mass     Specimen: BAL rt upper lobe      Estimated Blood Loss: less than 10 mL    Drains: none    Complications: None    Indications and History:  The patient is a 64 y.o. with abnormal ct hilar mediastanial lymph nodes.  The risks, benefits, complications, treatment options and expected outcomes were discussed with the patient and informed consent was obtained. The possibilities of reaction to medication, pulmonary aspiration, pneumothorax, bleeding, respiratory failure and failure to diagnose a condition and creating a complication requiring transfusion or operation were discussed with the patient who freely signed the consent.      Informed consent obtained  Time out performed with staff confirming patient ID, procedure and location.    Description of Procedure:  After the induction of topical nasopharyngeal anesthesia, the bronchoscope was passed through the endotracheal tube .   Careful inspection of the tracheal lumen was accomplished.     An entire bronchial exam was performed including the right and left bronchi with the following findings:     Endobronchial findings: Multiple segment mucus plugs; BAL to rt upper lobe   Trachea: Normal  Kat: Normal shape  Right upper lobe bronchus: nicely open, no lesions  Right middle lobe bronchus:nicely open, no lesions  Right lower lobe bronchus: nicely open, no lesions  Left main bronchus: nicely open, no lesions  Left upper lobe bronchus: nicely open, no lesions  Left lower lobe bronchus: nicely open, no lesions    The patient tolerated the procedure well.       Bessy Mcguire  MD   5/10/2024  23:24 EDT

## 2024-05-11 NOTE — PROCEDURES
Endobronchial ultrasound biopsy Procedure Note    Name: Jeanne King   Age:  64 y.o.   Sex: female  :  1960  MRN: [id number]  Time of procedure: 23:26 EDT      Date of Operation: 5/10/2024     Pre-op Diagnosis: mediastanial lymph nodes    Post-op Diagnosis: same    Surgeon: Bessy Mcguire MD    Anesthesia: per anesthesia Sedation    Operation: Flexible fiberoptic bronchoscopy, diagnostic bronchoscope  Findings: mediastanial lymph nodes    Specimen: FNA subcarina hilar     Estimated Blood Loss: less than 10 mL    Drains: none    Complications: None    Informed consent obtained  Time out performed with staff confirming patient ID, procedure and location.    Indications and History:  The patient is a 64 y.o. with abnormal ct.  The risks, benefits, complications, treatment options and expected outcomes were discussed with the patient and informed consent was obtained. The possibilities of reaction to medication, pulmonary aspiration, pneumothorax, bleeding, respiratory failure and failure to diagnose a condition and creating a complication requiring transfusion or operation were discussed with the patient who freely signed the consent.       Description of Procedure:  After induction of sedation, the bronchoscope was passed through the endotracheal tube .  Careful inspection of the tracheal lumen was accomplished.     An entire bronchial exam was performed including the right and left bronchi .     Endobronchial findings: Ultrasound used to locate subcarina and rt hilar , 6 passes with a 21-gauge needle, passed through good sample. Pathology was present at the site and reporting rersult.      KANDICE Hobson, ABSM  5/10/2024  23:26 EDT    normal sinus rhythm

## 2024-05-11 NOTE — PROGRESS NOTES
RT called to bedside.  Pt desatting, RT placed pt on bipap at this time.   FIO2 was increased to 60% to maintain a sat of 90% and above.  RN made aware.

## 2024-05-11 NOTE — PROGRESS NOTES
Cardiology [place]        LOS:  LOS: 4 days   Patient Name: Jeanne King  Age/Sex: 64 y.o. female  : 1960  MRN: [id number]    Day of Service: 24   Length of Stay: 4  Encounter Provider: Josh Mendez MD  Place of Service: [facility] CARDIOLOGY  Patient Care Team:  Provider, No Known as PCP - General    Subjective:     Chief Complaint: f/u shortness of breath / edema    Subjective: patient remains on supplemental O2, went for bronch today, metolazone added    Current Medications:   Scheduled Meds:acetaZOLAMIDE (DIAMOX) 250 mg in sterile water (preservative free) 2.5 mL injection, 250 mg, Intravenous, Daily  ALPRAZolam, 1 mg, Oral, TID  budesonide, 0.5 mg, Nebulization, BID - RT  FLUoxetine, 10 mg, Oral, Daily  FLUoxetine, 20 mg, Oral, Daily  folic acid, 1 mg, Oral, Daily  furosemide, 40 mg, Intravenous, Q12H  insulin lispro, 2-7 Units, Subcutaneous, 4x Daily AC & at Bedtime  ipratropium-albuterol, 3 mL, Nebulization, 4x Daily - RT  methylPREDNISolone sodium succinate, 40 mg, Intravenous, Q12H  metOLazone, 2.5 mg, Oral, Daily  metoprolol succinate XL, 25 mg, Oral, Q12H  nicotine, 1 patch, Transdermal, Q24H  potassium chloride, 20 mEq, Oral, BID With Meals  sodium chloride, 10 mL, Intravenous, Q12H      Continuous Infusions:     Allergies:  Allergies   Allergen Reactions    Keflex [Cephalexin] Mental Status Change and Provider Review Needed    Morphine Mental Status Change    Premarin [Conjugated Estrogens] Mental Status Change       Review of Systems   Constitutional: Positive for malaise/fatigue. Negative for chills and diaphoresis.   Cardiovascular:  Positive for dyspnea on exertion and leg swelling. Negative for chest pain, irregular heartbeat, near-syncope, orthopnea, palpitations, paroxysmal nocturnal dyspnea and syncope.   Respiratory:  Negative for cough, shortness of breath, sleep disturbances due to breathing and sputum production.    Gastrointestinal:  Negative  for change in bowel habit.   Genitourinary:  Negative for urgency.   Neurological:  Negative for dizziness and headaches.   Psychiatric/Behavioral:  Negative for altered mental status.          Objective:     Temp:  [97.6 °F (36.4 °C)-98.8 °F (37.1 °C)] 98.8 °F (37.1 °C)  Heart Rate:  [55-82] 61  Resp:  [12-18] 14  BP: (106-130)/(53-77) 124/77     Intake/Output Summary (Last 24 hours) at 5/11/2024 1255  Last data filed at 5/11/2024 0840  Gross per 24 hour   Intake 240 ml   Output 3350 ml   Net -3110 ml     Body mass index is 33.65 kg/m².      05/10/24  0500 05/10/24  0743 05/11/24  0500   Weight: 102 kg (224 lb 13.9 oz) 96.2 kg (212 lb) 100 kg (221 lb 5.5 oz)         General Appearance:    Alert, cooperative, in no acute distress                                Head: Atraumatic, normocephalic, PERRLA               Neck:   supple, no JVD   Lungs:     Supplemental O2, dim bilat bases    Heart:    Regular rhythm and normal rate, normal S1 and S2   Abdomen:     Normal bowel sounds, no masses, no organomegaly, soft  nontender, nondistended, no guarding, no rebound  tenderness   Extremities:   Moves all extremities well, edema, no cyanosis, no  redness   Pulses:   Pulses palpable and equal bilaterally   Skin:   No bleeding, bruising or rash   Neurologic:   Awake, alert, oriented x3         Lab Review:   Results from last 7 days   Lab Units 05/11/24  0414 05/11/24  0300 05/10/24  0551 05/08/24  0618 05/07/24  0419   SODIUM mmol/L 136  --  137   < > 140   POTASSIUM mmol/L 4.1  --  5.0   < > 3.8   CHLORIDE mmol/L 92*  --  94*   < > 99   CO2 mmol/L 40.0*  --  36.0*   < > 31.0*   BUN mg/dL 28*  --  27*   < > 12   CREATININE mg/dL 0.71 0.73 0.71   < > 0.59   GLUCOSE mg/dL 235*  --  123*   < > 102*   CALCIUM mg/dL 9.3  --  9.2   < > 8.3*   AST (SGOT) U/L  --   --  36*  --  19   ALT (SGPT) U/L  --   --  45*  --  16    < > = values in this interval not displayed.     Results from last 7 days   Lab Units 05/08/24  1016  05/08/24  0618   HSTROP T ng/L 22* 19*     Results from last 7 days   Lab Units 05/11/24  0414 05/11/24  0300 05/10/24  0551   WBC 10*3/mm3 8.42  --  10.46   HEMOGLOBIN g/dL 17.5*  --  18.1*   HEMOGLOBIN, POC g/dL  --  20.6*  --    HEMATOCRIT % 58.4*  --  59.8*   HEMATOCRIT POC %  --  61*  --    PLATELETS 10*3/mm3 105*  --  124*         Results from last 7 days   Lab Units 05/10/24  0551 05/09/24  0308   MAGNESIUM mg/dL 1.9 1.9     Results from last 7 days   Lab Units 05/07/24  0419   CHOLESTEROL mg/dL 81   TRIGLYCERIDES mg/dL 60   HDL CHOL mg/dL 34*     Results from last 7 days   Lab Units 05/07/24  0419   PROBNP pg/mL 1,213.0*     Results from last 7 days   Lab Units 05/07/24  0419   TSH uIU/mL 0.611       Recent Radiology:  Imaging Results (Most Recent)       Procedure Component Value Units Date/Time    XR Chest 1 View [[id number]] Collected: 05/11/24 1119     Updated: 05/11/24 1124    Narrative:      XR CHEST 1 VW    Date of Exam: 5/11/2024 6:03 AM EDT    Indication: H/O CHF    Comparison: 5/10/2024    Findings:  Cardiomegaly is stable. There is persistent pulmonary vascular congestion. Pulmonary interstitial edema appears improved. There is improving bibasilar airspace disease. No pleural effusion. No pneumothorax. Bony structures are unremarkable.      Impression:      Impression:    1. Stable cardiomegaly.  2. Slight improvement in bibasilar airspace disease.      Electronically Signed: Noble Hernandez MD    5/11/2024 11:22 AM EDT    Workstation ID: [id number]    XR Chest 1 View [[id number]] Collected: 05/10/24 0920     Updated: 05/10/24 0924    Narrative:      XR CHEST 1 VW    Date of Exam: 5/10/2024 9:11 AM EDT    Indication: post op    Comparison: None available.    Findings:  Cardiac silhouette is enlarged. Pulmonary vasculature is indistinct. Scattered bibasilar atelectasis is present. No significant pleural effusion or pneumothorax. No acute osseous lesion is seen.      Impression:       "Impression:  1.Cardiomegaly with pulmonary vascular congestion and scattered bibasilar atelectasis.      Electronically Signed: Frankie Matson MD    5/10/2024 9:22 AM EDT    Workstation ID: [id number]    CT Outside Films [[id number]] Resulted: 05/08/24 0935     Updated: 05/08/24 0935    Narrative:      This procedure was auto-finalized with no dictation required.    XR Outside Films [[id number]] Resulted: 05/08/24 0935     Updated: 05/08/24 0935    Narrative:      This procedure was auto-finalized with no dictation required.    US Liver [[id number]] Collected: 05/07/24 0731     Updated: 05/07/24 0735    Narrative:      DATE OF EXAM:  5/7/2024 4:35 AM     PROCEDURE:  US LIVER-     INDICATIONS:  ascites, eval for cirrhosis     COMPARISON:  No Comparisons Available     TECHNIQUE:   Grayscale and color Doppler ultrasound evaluation of the limited abdomen  was performed.     FINDINGS:  The liver is enlarged, 20.0 cm in length. The liver echotexture is  coarsened. There is a subtle nodular surface contour of the liver  suggestive of cirrhosis. No focal liver lesion is identified. There is a  \"starry eric\" pattern of the liver parenchyma, which may also reflect  changes of chronic liver disease. No ascites is evident. Main portal  vein is patent with hepatopetal flow. The imaged hepatic veins are  patent. Common bile duct caliber is within normal limits, 6 mm. No  intrahepatic biliary ductal dilation is observed. The intrahepatic IVC  demonstrates color flow.       Impression:      1. Hepatomegaly.  2. Subtle nodular surface contour of the liver with mildly coarsened  echotexture, raising the possibility of cirrhosis.  3. No focal liver lesion is seen.  4. No ascites is seen.     Electronically Signed By-Jane Woodruff MD On:5/7/2024 7:33 AM               ECHOCARDIOGRAM:    Results for orders placed during the hospital encounter of 05/07/24    Adult Transthoracic Echo Complete W/ Cont if Necessary Per Protocol    Interpretation " Summary    Left ventricular systolic function is normal. Left ventricular ejection fraction appears to be 56 - 60%.    Left ventricular diastolic function is consistent with (grade I) impaired relaxation.    Mildly reduced right ventricular systolic function noted.    The right ventricular cavity is moderately dilated.    Estimated right ventricular systolic pressure from tricuspid regurgitation is normal (<35 mmHg).    IVC is dilated        I reviewed the patient's new clinical results.    EKG:      Assessment:       Acute hypoxic respiratory failure    Lung mass    Acute hypoxic and hypercapnic respiratory insufficiency  Volume overload / HFpEF / RV dysfunction  HTN  Tobacco abuse  Abnormal CT at OSH with Concern for primary lung neoplasm with 2 lung lesions identified with mediastinal and hilar lymphadenopathy and cirrhotic changes of liver  Polycythemia       Plan:   Patient admitted with shortness of breath, lower extremity edema, long term smoker, has not seen physician in a number of years, CT at [facility] with concern for lung neoplasm and hilar lymphadenopathy  She is overloaded, continue IV diuresis- added diamox for alkalosis and metolazone added  Stress test as outpt once improves from lung standpoint / on less O2  Needs SHANKAR work up as outpt      Patient is seen and examined and findings are verified.  All data is reviewed by me personally.  Assessment and plan formulated by APC was done after discussion with attending.  I spent more than 50% of time in taking care of the patient.    MDM:    1.  Shortness of breath:    Patient underwent bronchoscopy yesterday and mucous plugs were noted.  Patient is slowly getting better but still requiring high oxygen    2.  COPD exacerbation:    Patient is on nebulization treatment.  Patient is on Solu-Medrol current treatment would be continued    3.  Congestive heart failure acute on chronic diastolic:    Continue diuresis.  I will decrease dose of Lasix as patient is  having contraction alkalosis.  Patient may need Diamox    4.  Bilateral leg edema:    Patient leg edema has improved dramatically    5.  Hypertension:    Blood pressure is controlled on metoprolol.    Electronically signed by Josh Mendez MD, 05/11/24, 12:58 PM EDT.            Josh Mendez MD  05/11/24  12:55 EDT

## 2024-05-11 NOTE — PROGRESS NOTES
"PULMONARY CRITICAL CARE PROGRESS  NOTE      PATIENT IDENTIFICATION:  Name: Jeanne King  MRN: [id number]  :  1960     Age: 64 y.o.  Sex: female    DATE OF Note:  2024   Referring Physician: Tiarra Renee MD                  Subjective:   In bed, resting, no SOB, no chest or abd pain, no bowel or bladder issues       Objective:  tMax 24 hrs: Temp (24hrs), Av °F (36.7 °C), Min:97.6 °F (36.4 °C), Max:98.8 °F (37.1 °C)      Vitals Ranges:   Temp:  [97.6 °F (36.4 °C)-98.8 °F (37.1 °C)] 98.8 °F (37.1 °C)  Heart Rate:  [55-82] 61  Resp:  [12-18] 14  BP: (106-130)/(53-77) 124/77    Intake and Output Last 3 Shifts:   I/O last 3 completed shifts:  In: 600 [I.V.:600]  Out: 2450 [Urine:2450]    Exam:  /77 (BP Location: Right arm, Patient Position: Lying)   Pulse 61   Temp 98.8 °F (37.1 °C) (Oral)   Resp 14   Ht 172.7 cm (68\")   Wt 100 kg (221 lb 5.5 oz)   SpO2 93%   BMI 33.65 kg/m²     General Appearance:  AAO   HEENT:  Normocephalic, without obvious abnormality. Conjunctivae/corneas clear.  Normal external ear canals. Nares normal, no drainage     Neck:  Supple, symmetrical, trachea midline. No JVD.  Lungs /Chest wall:   Bilateral basal rhonchi, respirations unlabored, symmetrical wall movement.     Heart:  Regular rate and rhythm, systolic murmur PMI left sternal border  Abdomen: Soft, nontender, no masses, no organomegaly.    Extremities: Trace edema, no clubbing or cyanosis        Medications:  acetaZOLAMIDE (DIAMOX) 250 mg in sterile water (preservative free) 2.5 mL injection, 250 mg, Intravenous, Daily  ALPRAZolam, 1 mg, Oral, TID  budesonide, 0.5 mg, Nebulization, BID - RT  FLUoxetine, 10 mg, Oral, Daily  FLUoxetine, 20 mg, Oral, Daily  folic acid, 1 mg, Oral, Daily  [START ON 2024] furosemide, 40 mg, Intravenous, Daily  insulin lispro, 2-7 Units, Subcutaneous, 4x Daily AC & at Bedtime  ipratropium-albuterol, 3 mL, Nebulization, 4x Daily - RT  methylPREDNISolone sodium " succinate, 40 mg, Intravenous, Q12H  metoprolol succinate XL, 25 mg, Oral, Q12H  nicotine, 1 patch, Transdermal, Q24H  potassium chloride, 20 mEq, Oral, BID With Meals  sodium chloride, 10 mL, Intravenous, Q12H        Infusion:        PRN:    acetaminophen    senna-docusate sodium **AND** polyethylene glycol **AND** bisacodyl **AND** bisacodyl    dextrose    dextrose    glucagon (human recombinant)    ipratropium-albuterol    nitroglycerin    ondansetron    sodium chloride    sodium chloride  Data Review:  All labs (24hrs):   Recent Results (from the past 24 hour(s))   POC Creatinine    Collection Time: 05/11/24  3:00 AM    Specimen: Arterial Blood   Result Value Ref Range    Creatinine 0.73 0.60 - 1.30 mg/dL    eGFR 91.4 >60.0 mL/min/1.73   Blood Gas, Arterial -    Collection Time: 05/11/24  3:00 AM    Specimen: Arterial Blood   Result Value Ref Range    Site Right Radial     Karan's Test Positive     pH, Arterial 7.290 (L) 7.350 - 7.450 pH units    pCO2, Arterial 87.6 (C) 35.0 - 48.0 mm Hg    pO2, Arterial 62.5 (L) 83.0 - 108.0 mm Hg    HCO3, Arterial 42.1 (H) 21.0 - 28.0 mmol/L    Base Excess, Arterial 9.4 (H) 0.0 - 3.0 mmol/L    O2 Saturation, Arterial 86.7 (L) 94.0 - 98.0 %    Barometric Pressure for Blood Gas      Modality HFNC     FIO2 32 %    Hemodilution No    POCT Electrolytes +HGB +HCT    Collection Time: 05/11/24  3:00 AM    Specimen: Arterial Blood   Result Value Ref Range    Sodium 138 138 - 146 mmol/L    POC Potassium 3.8 3.5 - 4.5 mmol/L    Ionized Calcium 1.22 1.15 - 1.33 mmol/L    Glucose 210 (H) 74 - 100 mg/dL    Hematocrit 61 (H) 38 - 51 %    Hemoglobin 20.6 (C) 12.0 - 17.0 g/dL   POC Lactate    Collection Time: 05/11/24  3:00 AM    Specimen: Arterial Blood   Result Value Ref Range    Lactate 2.2 (C) 0.2 - 2.0 mmol/L   POC Glucose Once    Collection Time: 05/11/24  3:00 AM    Specimen: Arterial Blood   Result Value Ref Range    Glucose 210 (H) 74 - 100 mg/dL   STAT Lactic Acid, Reflex     Collection Time: 05/11/24  4:14 AM    Specimen: Blood   Result Value Ref Range    Lactate 2.3 (C) 0.5 - 2.0 mmol/L   CBC (No Diff)    Collection Time: 05/11/24  4:14 AM    Specimen: Blood   Result Value Ref Range    WBC 8.42 3.40 - 10.80 10*3/mm3    RBC 5.97 (H) 3.77 - 5.28 10*6/mm3    Hemoglobin 17.5 (H) 12.0 - 15.9 g/dL    Hematocrit 58.4 (H) 34.0 - 46.6 %    MCV 97.8 (H) 79.0 - 97.0 fL    MCH 29.3 26.6 - 33.0 pg    MCHC 30.0 (L) 31.5 - 35.7 g/dL    RDW 15.8 (H) 12.3 - 15.4 %    RDW-SD 56.7 (H) 37.0 - 54.0 fl    MPV 10.4 6.0 - 12.0 fL    Platelets 105 (L) 140 - 450 10*3/mm3   Basic Metabolic Panel    Collection Time: 05/11/24  4:14 AM    Specimen: Blood   Result Value Ref Range    Glucose 235 (H) 65 - 99 mg/dL    BUN 28 (H) 8 - 23 mg/dL    Creatinine 0.71 0.57 - 1.00 mg/dL    Sodium 136 136 - 145 mmol/L    Potassium 4.1 3.5 - 5.2 mmol/L    Chloride 92 (L) 98 - 107 mmol/L    CO2 40.0 (H) 22.0 - 29.0 mmol/L    Calcium 9.3 8.6 - 10.5 mg/dL    BUN/Creatinine Ratio 39.4 (H) 7.0 - 25.0    Anion Gap 4.0 (L) 5.0 - 15.0 mmol/L    eGFR 94.5 >60.0 mL/min/1.73   STAT Lactic Acid, Reflex    Collection Time: 05/11/24  7:18 AM    Specimen: Blood   Result Value Ref Range    Lactate 1.7 0.5 - 2.0 mmol/L   POC Glucose Once    Collection Time: 05/11/24  7:35 AM    Specimen: Blood   Result Value Ref Range    Glucose 156 (H) 70 - 105 mg/dL   POC Glucose 4x Daily Before Meals & at Bedtime    Collection Time: 05/11/24 11:09 AM    Specimen: Blood   Result Value Ref Range    Glucose 119 (H) 70 - 105 mg/dL        Imaging:  XR Chest 1 View  Narrative: XR CHEST 1 VW    Date of Exam: 5/11/2024 6:03 AM EDT    Indication: H/O CHF    Comparison: 5/10/2024    Findings:  Cardiomegaly is stable. There is persistent pulmonary vascular congestion. Pulmonary interstitial edema appears improved. There is improving bibasilar airspace disease. No pleural effusion. No pneumothorax. Bony structures are unremarkable.  Impression: Impression:    1. Stable  cardiomegaly.  2. Slight improvement in bibasilar airspace disease.    Electronically Signed: Noble Hernandez MD    5/11/2024 11:22 AM EDT    Workstation ID: [id number]       ASSESSMENT:  Acute hypoxic respiratory failure   AE COPD  Hx COVID-19   RLL spiculated mass lesion    HTN  Tobacco abuse  Obesity  Anxiety     PLAN:   S/p EBUS with preliminary results neg for cancer will await final culture  Encourage OOB during day   Bronchodilators  Inhaled corticosteroids  IV steroids   Incentive spirometer  Nicotine patch   Electrolytes/ glycemic control  No DVT prophylaxis     Discussed with Dr Shayla Guadalupe, FRANCIS   5/11/2024  13:00 EDT      I personally have examined  and interviewed the patient. I have reviewed the history, data, problems, assessment and plan with our NP.  Total Critical care time in direct medical management (   ) minutes, This time specifically excludes time spent performing procedures.    Bessy Mcguire MD   5/11/2024  21:40 EDT

## 2024-05-11 NOTE — PLAN OF CARE
Goal Outcome Evaluation:      Pt has been on 5L highflow for most of the day, went to sleep and oxygen went into 85, place on bipap. No  complaints of pain. Continue to monitor                                         [Normal Growth] : growth [Normal Development] : development  [No Elimination Concerns] : elimination [Continue Regimen] : feeding [No Skin Concerns] : skin [Normal Sleep Pattern] : sleep [None] : no medical problems [Anticipatory Guidance Given] : Anticipatory guidance addressed as per the history of present illness section [No Vaccines] : no vaccines needed [No Medications] : ~He/She~ is not on any medications [Patient] : patient [Parent/Guardian] : Parent/Guardian [] : The components of the vaccine(s) to be administered today are listed in the plan of care. The disease(s) for which the vaccine(s) are intended to prevent and the risks have been discussed with the caretaker.  The risks are also included in the appropriate vaccination information statements which have been provided to the patient's caregiver.  The caregiver has given consent to vaccinate. [FreeTextEntry1] : Continue balanced diet with all food groups. Brush teeth twice a day with toothbrush. Recommend visit to dentist. Maintain consistent daily routines and sleep schedule. Personal hygiene, puberty, and sexual health reviewed. Risky behaviors assessed. School discussed. Limit screen time to no more than 2 hours per day. Encourage physical activity.\par Return 1 year for routine well child check.\par

## 2024-05-11 NOTE — PLAN OF CARE
Goal Outcome Evaluation:  Plan of Care Reviewed With: patient        Progress: no change       Patient came in here with a diagnosis of Acute Hypoxic Respiratory Failure- Had a bronchoscopy yesterday.     Noticed patient saturation level had dropped to 87-88 on 4L. Increased O2 to 5.5  High flow NC- currently patient saturation level is 91%    ABG was also done- Mr. Vargas is aware of result. Ordered Bipap machine- at bedside.     Plan of care is ongoing.

## 2024-05-11 NOTE — PROGRESS NOTES
[facility] MEDICINE SERVICE  DAILY PROGRESS NOTE    NAME: Jeanne King  : 1960  MRN: [id number]      LOS: 4 days     PROVIDER OF SERVICE: Bon Dunn MD    Chief Complaint: Acute hypoxic respiratory failure    Subjective:     Interval History:  History taken from: patient  Patient Complaints: Sob improving; to have hypercapnia with contraction alkalosis diuretics being adjusted by cardiology   Patient Denies: Nausea or vomiting    Review of Systems:   Review of Systems  14 point review of system unremarkable except mentioned above  Objective:     Vital Signs  Temp:  [97.6 °F (36.4 °C)-98.8 °F (37.1 °C)] 98.8 °F (37.1 °C)  Heart Rate:  [55-82] 61  Resp:  [12-18] 14  BP: (106-130)/(53-77) 124/77  Flow (L/min):  [4-5] 5   Body mass index is 33.65 kg/m².    Physical Exam  Physical Exam  HENT:      Head: Atraumatic.      Mouth/Throat:      Mouth: Mucous membranes are moist.   Eyes:      Pupils: Pupils are equal, round, and reactive to light.   Cardiovascular:      Rate and Rhythm: Normal rate and regular rhythm.      Pulses: Normal pulses.      Heart sounds: Normal heart sounds.   Abdominal:      General: Bowel sounds are normal.      Palpations: Abdomen is soft.   Musculoskeletal:      Right lower leg: Edema present.      Left lower leg: Edema present.   Skin:     General: Skin is warm.   Neurological:      General: No focal deficit present.      Mental Status: She is alert and oriented to person, place, and time.   Psychiatric:         Mood and Affect: Mood normal.         Scheduled Meds   acetaZOLAMIDE (DIAMOX) 250 mg in sterile water (preservative free) 2.5 mL injection, 250 mg, Intravenous, Daily  ALPRAZolam, 1 mg, Oral, TID  budesonide, 0.5 mg, Nebulization, BID - RT  FLUoxetine, 10 mg, Oral, Daily  FLUoxetine, 20 mg, Oral, Daily  folic acid, 1 mg, Oral, Daily  [START ON 2024] furosemide, 40 mg, Intravenous, Daily  insulin lispro, 2-7 Units, Subcutaneous, 4x Daily AC & at  Bedtime  ipratropium-albuterol, 3 mL, Nebulization, 4x Daily - RT  methylPREDNISolone sodium succinate, 40 mg, Intravenous, Q12H  metoprolol succinate XL, 25 mg, Oral, Q12H  nicotine, 1 patch, Transdermal, Q24H  potassium chloride, 20 mEq, Oral, BID With Meals  sodium chloride, 10 mL, Intravenous, Q12H       PRN Meds     acetaminophen    senna-docusate sodium **AND** polyethylene glycol **AND** bisacodyl **AND** bisacodyl    dextrose    dextrose    glucagon (human recombinant)    ipratropium-albuterol    nitroglycerin    ondansetron    sodium chloride    sodium chloride   Infusions         Diagnostic Data    Results from last 7 days   Lab Units 05/11/24  0414 05/11/24  0300 05/10/24  0551   WBC 10*3/mm3 8.42  --  10.46   HEMOGLOBIN g/dL 17.5*  --  18.1*   HEMOGLOBIN, POC   --    < >  --    HEMATOCRIT % 58.4*  --  59.8*   HEMATOCRIT POC   --    < >  --    PLATELETS 10*3/mm3 105*  --  124*   GLUCOSE mg/dL 235*  --  123*   CREATININE mg/dL 0.71   < > 0.71   BUN mg/dL 28*  --  27*   SODIUM mmol/L 136  --  137   POTASSIUM mmol/L 4.1  --  5.0   AST (SGOT) U/L  --   --  36*   ALT (SGPT) U/L  --   --  45*   ALK PHOS U/L  --   --  92   BILIRUBIN mg/dL  --   --  0.9   ANION GAP mmol/L 4.0*  --  7.0    < > = values in this interval not displayed.       XR Chest 1 View    Result Date: 5/11/2024  Impression: 1. Stable cardiomegaly. 2. Slight improvement in bibasilar airspace disease. Electronically Signed: Noble Hernandez MD  5/11/2024 11:22 AM EDT  Workstation ID: [id number]    XR Chest 1 View    Result Date: 5/10/2024  Impression: 1.Cardiomegaly with pulmonary vascular congestion and scattered bibasilar atelectasis. Electronically Signed: Frankie Matson MD  5/10/2024 9:22 AM EDT  Workstation ID: [id number]       I reviewed the patient's new clinical results.    Assessment/Plan:     Active and Resolved Problems  #Acute hypoxemic respiratory failure  #Acute exacerbation of likely diastolic heart failure  #Acute pulmonary edema  #  suspicious Malignant lung neoplasm and mediastinal   Lymphadenopathy  #Bilateral lower extremity edema likely from above  -Continue to supplement oxygen to keep saturation above 90 to 92%  - CT of the chest report as below  1. No pulmonary emboli 2. Bilateral rond glass patchy opacities nonspecific suggestion of pulmonary edema.  A right lower lobe 2.3 cm spiculated mass lesion, another more distal lesion 1.7 cm.  These lesions are highly concerning for primary lung neoplasm and recommend biopsy or PET/CT exam.  Lingular atelectasis.  No pneumothorax or pleural effusion.  Mediastinal and hilar lymphadenopathy.  #3 cardiomegaly and coronary artery calcifications.  #4 cirrhotic enlarged liver.  Splenomegaly.  Abdominal ascites.  - afebrile. Negative for cough or fever. Negative leukocytosis. Does not appear to have pneumonia     Continue IV Lasix   - Transthoracic echocardiogram  noted ; out pt ischemic workup   -cardiology team consulted   - s/p EBUS; await lab tests results   -oncology team following the pt      Liver cirrhosis     -Ultrasound noted for subtotal nodular surface contour of the liver with mildly coarsened echotexture raising the possibility of cirrhosis and no focal as well as ascites seen  -HCC  Screening     #Erythrocytosis  -Hematocrit 59.9  - Serum EPO level low      Essential hypertension  - Continue to monitor  - Continue Norvasc     #History of anxiety-continue Xanax and Protonix     #COPD  #Suspected SHANKAR-outpatient sleep study  -Inhalers bronchodilators and IV steroids     Obesity BMI 32.82  - Lifestyle and dietary management        DVT prophylaxis:  Mechanical DVT prophylaxis orders are present.         Code status is   Code Status and Medical Interventions:   Ordered at: 05/07/24 0243     Level Of Support Discussed With:    Patient     Code Status (Patient has no pulse and is not breathing):    CPR (Attempt to Resuscitate)     Medical Interventions (Patient has pulse or is breathing):     Full Support       Plan for disposition: home  in 1-2  days, currently requires IV  diuresis    Time: 35 minutes    Signature: Electronically signed by Bon Dunn MD, 05/11/24, 13:28 EDT.  [facility] Hospitalist Team

## 2024-05-12 LAB
ANION GAP SERPL CALCULATED.3IONS-SCNC: 6 MMOL/L (ref 5–15)
ARTERIAL PATENCY WRIST A: POSITIVE
ATMOSPHERIC PRESS: ABNORMAL MM[HG]
BACTERIA SPEC AEROBE CULT: NORMAL
BASE EXCESS BLDA CALC-SCNC: 12.2 MMOL/L (ref 0–3)
BASOPHILS # BLD AUTO: 0.02 10*3/MM3 (ref 0–0.2)
BASOPHILS NFR BLD AUTO: 0.2 % (ref 0–1.5)
BDY SITE: ABNORMAL
BUN SERPL-MCNC: 28 MG/DL (ref 8–23)
BUN/CREAT SERPL: 43.8 (ref 7–25)
CALCIUM SPEC-SCNC: 9.5 MG/DL (ref 8.6–10.5)
CHLORIDE SERPL-SCNC: 90 MMOL/L (ref 98–107)
CO2 BLDA-SCNC: 45 MMOL/L (ref 22–29)
CO2 SERPL-SCNC: 40 MMOL/L (ref 22–29)
CREAT SERPL-MCNC: 0.64 MG/DL (ref 0.57–1)
DEPRECATED RDW RBC AUTO: 54.9 FL (ref 37–54)
EGFRCR SERPLBLD CKD-EPI 2021: 98.2 ML/MIN/1.73
EOSINOPHIL # BLD AUTO: 0 10*3/MM3 (ref 0–0.4)
EOSINOPHIL NFR BLD AUTO: 0 % (ref 0.3–6.2)
ERYTHROCYTE [DISTWIDTH] IN BLOOD BY AUTOMATED COUNT: 15.6 % (ref 12.3–15.4)
GLUCOSE BLDC GLUCOMTR-MCNC: 111 MG/DL (ref 70–105)
GLUCOSE BLDC GLUCOMTR-MCNC: 132 MG/DL (ref 70–105)
GLUCOSE BLDC GLUCOMTR-MCNC: 152 MG/DL (ref 70–105)
GLUCOSE BLDC GLUCOMTR-MCNC: 174 MG/DL (ref 70–105)
GLUCOSE SERPL-MCNC: 116 MG/DL (ref 65–99)
GRAM STN SPEC: NORMAL
HCO3 BLDA-SCNC: 42.8 MMOL/L (ref 21–28)
HCT VFR BLD AUTO: 58.7 % (ref 34–46.6)
HEMODILUTION: NO
HGB BLD-MCNC: 17.8 G/DL (ref 12–15.9)
IMM GRANULOCYTES # BLD AUTO: 0.08 10*3/MM3 (ref 0–0.05)
IMM GRANULOCYTES NFR BLD AUTO: 0.8 % (ref 0–0.5)
INHALED O2 CONCENTRATION: 50 %
LYMPHOCYTES # BLD AUTO: 0.97 10*3/MM3 (ref 0.7–3.1)
LYMPHOCYTES NFR BLD AUTO: 9.1 % (ref 19.6–45.3)
MCH RBC QN AUTO: 29.1 PG (ref 26.6–33)
MCHC RBC AUTO-ENTMCNC: 30.3 G/DL (ref 31.5–35.7)
MCV RBC AUTO: 96.1 FL (ref 79–97)
MODALITY: ABNORMAL
MONOCYTES # BLD AUTO: 0.72 10*3/MM3 (ref 0.1–0.9)
MONOCYTES NFR BLD AUTO: 6.8 % (ref 5–12)
NEUTROPHILS NFR BLD AUTO: 8.87 10*3/MM3 (ref 1.7–7)
NEUTROPHILS NFR BLD AUTO: 83.1 % (ref 42.7–76)
NRBC BLD AUTO-RTO: 0 /100 WBC (ref 0–0.2)
PCO2 BLDA: 71.7 MM HG (ref 35–48)
PH BLDA: 7.38 PH UNITS (ref 7.35–7.45)
PLATELET # BLD AUTO: 102 10*3/MM3 (ref 140–450)
PMV BLD AUTO: 9.4 FL (ref 6–12)
PO2 BLDA: 79.7 MM HG (ref 83–108)
POTASSIUM SERPL-SCNC: 4.5 MMOL/L (ref 3.5–5.2)
RBC # BLD AUTO: 6.11 10*6/MM3 (ref 3.77–5.28)
SAO2 % BLDCOA: 94.7 % (ref 94–98)
SODIUM SERPL-SCNC: 136 MMOL/L (ref 136–145)
WBC NRBC COR # BLD AUTO: 10.66 10*3/MM3 (ref 3.4–10.8)

## 2024-05-12 PROCEDURE — 93005 ELECTROCARDIOGRAM TRACING: CPT | Performed by: STUDENT IN AN ORGANIZED HEALTH CARE EDUCATION/TRAINING PROGRAM

## 2024-05-12 PROCEDURE — 94664 DEMO&/EVAL PT USE INHALER: CPT

## 2024-05-12 PROCEDURE — 94799 UNLISTED PULMONARY SVC/PX: CPT

## 2024-05-12 PROCEDURE — 94761 N-INVAS EAR/PLS OXIMETRY MLT: CPT

## 2024-05-12 PROCEDURE — 36600 WITHDRAWAL OF ARTERIAL BLOOD: CPT | Performed by: STUDENT IN AN ORGANIZED HEALTH CARE EDUCATION/TRAINING PROGRAM

## 2024-05-12 PROCEDURE — 25010000002 ACETAZOLAMIDE PER 500 MG: Performed by: NURSE PRACTITIONER

## 2024-05-12 PROCEDURE — 99232 SBSQ HOSP IP/OBS MODERATE 35: CPT | Performed by: INTERNAL MEDICINE

## 2024-05-12 PROCEDURE — 85025 COMPLETE CBC W/AUTO DIFF WBC: CPT | Performed by: NURSE PRACTITIONER

## 2024-05-12 PROCEDURE — 25010000002 METHYLPREDNISOLONE PER 40 MG: Performed by: STUDENT IN AN ORGANIZED HEALTH CARE EDUCATION/TRAINING PROGRAM

## 2024-05-12 PROCEDURE — 80048 BASIC METABOLIC PNL TOTAL CA: CPT | Performed by: NURSE PRACTITIONER

## 2024-05-12 PROCEDURE — 82803 BLOOD GASES ANY COMBINATION: CPT | Performed by: STUDENT IN AN ORGANIZED HEALTH CARE EDUCATION/TRAINING PROGRAM

## 2024-05-12 PROCEDURE — 25010000002 FUROSEMIDE PER 20 MG: Performed by: INTERNAL MEDICINE

## 2024-05-12 PROCEDURE — 82948 REAGENT STRIP/BLOOD GLUCOSE: CPT | Performed by: STUDENT IN AN ORGANIZED HEALTH CARE EDUCATION/TRAINING PROGRAM

## 2024-05-12 PROCEDURE — 63710000001 INSULIN LISPRO (HUMAN) PER 5 UNITS: Performed by: STUDENT IN AN ORGANIZED HEALTH CARE EDUCATION/TRAINING PROGRAM

## 2024-05-12 PROCEDURE — 94660 CPAP INITIATION&MGMT: CPT

## 2024-05-12 PROCEDURE — 82948 REAGENT STRIP/BLOOD GLUCOSE: CPT

## 2024-05-12 PROCEDURE — 93010 ELECTROCARDIOGRAM REPORT: CPT | Performed by: INTERNAL MEDICINE

## 2024-05-12 RX ORDER — FUROSEMIDE 40 MG/1
40 TABLET ORAL DAILY
Status: DISCONTINUED | OUTPATIENT
Start: 2024-05-13 | End: 2024-05-14 | Stop reason: HOSPADM

## 2024-05-12 RX ORDER — FUROSEMIDE 40 MG/1
40 TABLET ORAL DAILY
Status: DISCONTINUED | OUTPATIENT
Start: 2024-05-12 | End: 2024-05-12

## 2024-05-12 RX ADMIN — FUROSEMIDE 40 MG: 10 INJECTION, SOLUTION INTRAMUSCULAR; INTRAVENOUS at 09:22

## 2024-05-12 RX ADMIN — IPRATROPIUM BROMIDE AND ALBUTEROL SULFATE 3 ML: .5; 3 SOLUTION RESPIRATORY (INHALATION) at 06:23

## 2024-05-12 RX ADMIN — Medication 10 ML: at 21:25

## 2024-05-12 RX ADMIN — INSULIN LISPRO 2 UNITS: 100 INJECTION, SOLUTION INTRAVENOUS; SUBCUTANEOUS at 17:48

## 2024-05-12 RX ADMIN — POTASSIUM CHLORIDE 20 MEQ: 1500 TABLET, EXTENDED RELEASE ORAL at 17:48

## 2024-05-12 RX ADMIN — METHYLPREDNISOLONE SODIUM SUCCINATE 40 MG: 40 INJECTION, POWDER, FOR SOLUTION INTRAMUSCULAR; INTRAVENOUS at 09:23

## 2024-05-12 RX ADMIN — IPRATROPIUM BROMIDE AND ALBUTEROL SULFATE 3 ML: .5; 3 SOLUTION RESPIRATORY (INHALATION) at 10:53

## 2024-05-12 RX ADMIN — FLUOXETINE 20 MG: 20 CAPSULE ORAL at 09:25

## 2024-05-12 RX ADMIN — BUDESONIDE 0.5 MG: 0.5 INHALANT RESPIRATORY (INHALATION) at 18:28

## 2024-05-12 RX ADMIN — FLUOXETINE 10 MG: 10 CAPSULE ORAL at 09:24

## 2024-05-12 RX ADMIN — POTASSIUM CHLORIDE 20 MEQ: 1500 TABLET, EXTENDED RELEASE ORAL at 09:25

## 2024-05-12 RX ADMIN — BUDESONIDE 0.5 MG: 0.5 INHALANT RESPIRATORY (INHALATION) at 06:23

## 2024-05-12 RX ADMIN — Medication 10 ML: at 09:26

## 2024-05-12 RX ADMIN — METHYLPREDNISOLONE SODIUM SUCCINATE 40 MG: 40 INJECTION, POWDER, FOR SOLUTION INTRAMUSCULAR; INTRAVENOUS at 21:25

## 2024-05-12 RX ADMIN — ACETAZOLAMIDE 250 MG: 500 INJECTION, POWDER, LYOPHILIZED, FOR SOLUTION INTRAVENOUS at 09:21

## 2024-05-12 RX ADMIN — INSULIN LISPRO 2 UNITS: 100 INJECTION, SOLUTION INTRAVENOUS; SUBCUTANEOUS at 21:24

## 2024-05-12 RX ADMIN — IPRATROPIUM BROMIDE AND ALBUTEROL SULFATE 3 ML: .5; 3 SOLUTION RESPIRATORY (INHALATION) at 14:46

## 2024-05-12 RX ADMIN — FOLIC ACID 1 MG: 1 TABLET ORAL at 09:25

## 2024-05-12 RX ADMIN — IPRATROPIUM BROMIDE AND ALBUTEROL SULFATE 3 ML: .5; 3 SOLUTION RESPIRATORY (INHALATION) at 18:34

## 2024-05-12 NOTE — PLAN OF CARE
Goal Outcome Evaluation:  Plan of Care Reviewed With: patient        Progress: improving    Patient has no voiced complaint as of this time.   VS has been stable for now.     Plan of care is ongoing.

## 2024-05-12 NOTE — PROGRESS NOTES
"PULMONARY CRITICAL CARE PROGRESS  NOTE      PATIENT IDENTIFICATION:  Name: Jeanne King  MRN: [id number]  :  1960     Age: 64 y.o.  Sex: female    DATE OF Note:  2024   Referring Physician: Tiarra Renee MD                  Subjective:   Sitting in room, no SOB, no chest or abd pain, no bowel or bladder issues       Objective:  tMax 24 hrs: Temp (24hrs), Av.6 °F (37 °C), Min:98.2 °F (36.8 °C), Max:98.8 °F (37.1 °C)      Vitals Ranges:   Temp:  [98.2 °F (36.8 °C)-98.8 °F (37.1 °C)] 98.7 °F (37.1 °C)  Heart Rate:  [50-61] 52  Resp:  [12-24] 20  BP: (124-142)/(69-81) 142/81    Intake and Output Last 3 Shifts:   I/O last 3 completed shifts:  In: 960 [P.O.:960]  Out: 2290 [Urine:2290]    Exam:  /81 (BP Location: Right arm, Patient Position: Lying)   Pulse 52   Temp 98.7 °F (37.1 °C) (Oral)   Resp 20   Ht 172.7 cm (68\")   Wt 100 kg (221 lb 5.5 oz)   SpO2 98%   BMI 33.65 kg/m²     General Appearance:  AAO   HEENT:  Normocephalic, without obvious abnormality. Conjunctivae/corneas clear.  Normal external ear canals. Nares normal, no drainage     Neck:  Supple, symmetrical, trachea midline. No JVD.  Lungs /Chest wall:   Bilateral basal rhonchi, respirations unlabored, symmetrical wall movement.     Heart:  Regular rate and rhythm, systolic murmur PMI left sternal border  Abdomen: Soft, nontender, no masses, no organomegaly.    Extremities: Trace edema, no clubbing or cyanosis        Medications:  acetaZOLAMIDE (DIAMOX) 250 mg in sterile water (preservative free) 2.5 mL injection, 250 mg, Intravenous, Daily  budesonide, 0.5 mg, Nebulization, BID - RT  FLUoxetine, 10 mg, Oral, Daily  FLUoxetine, 20 mg, Oral, Daily  folic acid, 1 mg, Oral, Daily  furosemide, 40 mg, Intravenous, Daily  insulin lispro, 2-7 Units, Subcutaneous, 4x Daily AC & at Bedtime  ipratropium-albuterol, 3 mL, Nebulization, 4x Daily - RT  methylPREDNISolone sodium succinate, 40 mg, Intravenous, Q12H  metoprolol succinate " XL, 25 mg, Oral, Q12H  nicotine, 1 patch, Transdermal, Q24H  potassium chloride, 20 mEq, Oral, BID With Meals  sodium chloride, 10 mL, Intravenous, Q12H        Infusion:        PRN:    acetaminophen    ALPRAZolam    senna-docusate sodium **AND** polyethylene glycol **AND** bisacodyl **AND** bisacodyl    dextrose    dextrose    glucagon (human recombinant)    ipratropium-albuterol    nitroglycerin    ondansetron    sodium chloride    sodium chloride  Data Review:  All labs (24hrs):   Recent Results (from the past 24 hour(s))   POC Glucose Once    Collection Time: 05/11/24  4:56 PM    Specimen: Blood   Result Value Ref Range    Glucose 132 (H) 70 - 105 mg/dL   POC Glucose Once    Collection Time: 05/11/24  7:03 PM    Specimen: Blood   Result Value Ref Range    Glucose 152 (H) 70 - 105 mg/dL   Basic Metabolic Panel    Collection Time: 05/12/24  4:15 AM    Specimen: Blood   Result Value Ref Range    Glucose 116 (H) 65 - 99 mg/dL    BUN 28 (H) 8 - 23 mg/dL    Creatinine 0.64 0.57 - 1.00 mg/dL    Sodium 136 136 - 145 mmol/L    Potassium 4.5 3.5 - 5.2 mmol/L    Chloride 90 (L) 98 - 107 mmol/L    CO2 40.0 (H) 22.0 - 29.0 mmol/L    Calcium 9.5 8.6 - 10.5 mg/dL    BUN/Creatinine Ratio 43.8 (H) 7.0 - 25.0    Anion Gap 6.0 5.0 - 15.0 mmol/L    eGFR 98.2 >60.0 mL/min/1.73   CBC Auto Differential    Collection Time: 05/12/24  4:15 AM    Specimen: Blood   Result Value Ref Range    WBC 10.66 3.40 - 10.80 10*3/mm3    RBC 6.11 (H) 3.77 - 5.28 10*6/mm3    Hemoglobin 17.8 (H) 12.0 - 15.9 g/dL    Hematocrit 58.7 (H) 34.0 - 46.6 %    MCV 96.1 79.0 - 97.0 fL    MCH 29.1 26.6 - 33.0 pg    MCHC 30.3 (L) 31.5 - 35.7 g/dL    RDW 15.6 (H) 12.3 - 15.4 %    RDW-SD 54.9 (H) 37.0 - 54.0 fl    MPV 9.4 6.0 - 12.0 fL    Platelets 102 (L) 140 - 450 10*3/mm3    Neutrophil % 83.1 (H) 42.7 - 76.0 %    Lymphocyte % 9.1 (L) 19.6 - 45.3 %    Monocyte % 6.8 5.0 - 12.0 %    Eosinophil % 0.0 (L) 0.3 - 6.2 %    Basophil % 0.2 0.0 - 1.5 %    Immature Grans % 0.8  (H) 0.0 - 0.5 %    Neutrophils, Absolute 8.87 (H) 1.70 - 7.00 10*3/mm3    Lymphocytes, Absolute 0.97 0.70 - 3.10 10*3/mm3    Monocytes, Absolute 0.72 0.10 - 0.90 10*3/mm3    Eosinophils, Absolute 0.00 0.00 - 0.40 10*3/mm3    Basophils, Absolute 0.02 0.00 - 0.20 10*3/mm3    Immature Grans, Absolute 0.08 (H) 0.00 - 0.05 10*3/mm3    nRBC 0.0 0.0 - 0.2 /100 WBC   POC Glucose 4x Daily Before Meals & at Bedtime    Collection Time: 05/12/24  7:01 AM    Specimen: Blood   Result Value Ref Range    Glucose 111 (H) 70 - 105 mg/dL   Blood Gas, Arterial -    Collection Time: 05/12/24  8:57 AM    Specimen: Arterial Blood   Result Value Ref Range    Site Left Radial     Karan's Test Positive     pH, Arterial 7.384 7.350 - 7.450 pH units    pCO2, Arterial 71.7 (C) 35.0 - 48.0 mm Hg    pO2, Arterial 79.7 (L) 83.0 - 108.0 mm Hg    HCO3, Arterial 42.8 (H) 21.0 - 28.0 mmol/L    Base Excess, Arterial 12.2 (H) 0.0 - 3.0 mmol/L    O2 Saturation, Arterial 94.7 94.0 - 98.0 %    CO2 Content 45.0 (H) 22 - 29 mmol/L    Barometric Pressure for Blood Gas      Modality HFNC     FIO2 50 %    Hemodilution No    ECG 12 Lead Bradycardia    Collection Time: 05/12/24 10:40 AM   Result Value Ref Range    QT Interval 431 ms    QTC Interval 386 ms        Imaging:  XR Chest 1 View  Narrative: XR CHEST 1 VW    Date of Exam: 5/11/2024 6:03 AM EDT    Indication: H/O CHF    Comparison: 5/10/2024    Findings:  Cardiomegaly is stable. There is persistent pulmonary vascular congestion. Pulmonary interstitial edema appears improved. There is improving bibasilar airspace disease. No pleural effusion. No pneumothorax. Bony structures are unremarkable.  Impression: Impression:    1. Stable cardiomegaly.  2. Slight improvement in bibasilar airspace disease.    Electronically Signed: Nobel Hernandez MD    5/11/2024 11:22 AM EDT    Workstation ID: [id number]       ASSESSMENT:  Acute hypoxic respiratory failure   AE COPD  Hx COVID-19   RLL spiculated mass lesion     HTN  Tobacco abuse  Obesity  Anxiety     PLAN:   Biopsy results still pending   S/p EBUS with preliminary results neg for cancer will await final culture  Encourage OOB during day   Bronchodilators  Inhaled corticosteroids  IV steroids   Incentive spirometer  Nicotine patch   Electrolytes/ glycemic control  No DVT prophylaxis     Discussed with Dr Shayla Guadalupe, APRN   5/12/2024  11:12 EDT          I personally have examined  and interviewed the patient. I have reviewed the history, data, problems, assessment and plan with our NP.  Total Critical care time in direct medical management (   ) minutes, This time specifically excludes time spent performing procedures.    Bessy Mcguire MD

## 2024-05-12 NOTE — PROGRESS NOTES
[facility] MEDICINE SERVICE  DAILY PROGRESS NOTE    NAME: Jeanne King  : 1960  MRN: [id number]      LOS: 5 days     PROVIDER OF SERVICE: Bon Dunn MD    Chief Complaint: Acute hypoxic respiratory failure    Subjective:     Interval History:  History taken from: patient  Patient Complaints: Headaches, ABG noted  patient to be on BiPAP overnight   denies: Nausea or vomiting    Review of Systems:   Review of Systems  14 point review of system unremarkable except mentioned above  Objective:     Vital Signs  Temp:  [98.2 °F (36.8 °C)-98.8 °F (37.1 °C)] 98.7 °F (37.1 °C)  Heart Rate:  [50-61] 52  Resp:  [12-24] 20  BP: (124-142)/(69-81) 142/81  Flow (L/min):  [5] 5   Body mass index is 33.65 kg/m².    Physical Exam  Physical Exam  HENT:      Head: Atraumatic.      Mouth/Throat:      Mouth: Mucous membranes are moist.   Eyes:      Pupils: Pupils are equal, round, and reactive to light.   Cardiovascular:      Rate and Rhythm: Normal rate and regular rhythm.      Pulses: Normal pulses.      Heart sounds: Normal heart sounds.   Abdominal:      General: Bowel sounds are normal.      Palpations: Abdomen is soft.   Musculoskeletal:      Right lower leg: Edema present.      Left lower leg: Edema present.   Skin:     General: Skin is warm.   Neurological:      General: No focal deficit present.      Mental Status: She is alert and oriented to person, place, and time.   Psychiatric:         Mood and Affect: Mood normal.         Scheduled Meds   acetaZOLAMIDE (DIAMOX) 250 mg in sterile water (preservative free) 2.5 mL injection, 250 mg, Intravenous, Daily  budesonide, 0.5 mg, Nebulization, BID - RT  FLUoxetine, 10 mg, Oral, Daily  FLUoxetine, 20 mg, Oral, Daily  folic acid, 1 mg, Oral, Daily  furosemide, 40 mg, Intravenous, Daily  insulin lispro, 2-7 Units, Subcutaneous, 4x Daily AC & at Bedtime  ipratropium-albuterol, 3 mL, Nebulization, 4x Daily - RT  methylPREDNISolone sodium succinate, 40 mg, Intravenous,  Q12H  metoprolol succinate XL, 25 mg, Oral, Q12H  nicotine, 1 patch, Transdermal, Q24H  potassium chloride, 20 mEq, Oral, BID With Meals  sodium chloride, 10 mL, Intravenous, Q12H       PRN Meds     acetaminophen    ALPRAZolam    senna-docusate sodium **AND** polyethylene glycol **AND** bisacodyl **AND** bisacodyl    dextrose    dextrose    glucagon (human recombinant)    ipratropium-albuterol    nitroglycerin    ondansetron    sodium chloride    sodium chloride   Infusions         Diagnostic Data    Results from last 7 days   Lab Units 05/12/24  0415 05/11/24  0300 05/10/24  0551   WBC 10*3/mm3 10.66   < > 10.46   HEMOGLOBIN g/dL 17.8*   < > 18.1*   HEMOGLOBIN, POC   --    < >  --    HEMATOCRIT % 58.7*   < > 59.8*   HEMATOCRIT POC   --    < >  --    PLATELETS 10*3/mm3 102*   < > 124*   GLUCOSE mg/dL 116*   < > 123*   CREATININE mg/dL 0.64   < > 0.71   BUN mg/dL 28*   < > 27*   SODIUM mmol/L 136   < > 137   POTASSIUM mmol/L 4.5   < > 5.0   AST (SGOT) U/L  --   --  36*   ALT (SGPT) U/L  --   --  45*   ALK PHOS U/L  --   --  92   BILIRUBIN mg/dL  --   --  0.9   ANION GAP mmol/L 6.0   < > 7.0    < > = values in this interval not displayed.       XR Chest 1 View    Result Date: 5/11/2024  Impression: 1. Stable cardiomegaly. 2. Slight improvement in bibasilar airspace disease. Electronically Signed: Noble Hernandez MD  5/11/2024 11:22 AM EDT  Workstation ID: [id number]       I reviewed the patient's new clinical results.    Assessment/Plan:     Active and Resolved Problems  #Acute hypoxemic respiratory failure  #Acute exacerbation of likely diastolic heart failure  #Acute pulmonary edema  # suspicious Malignant lung neoplasm and mediastinal   Lymphadenopathy  #Bilateral lower extremity edema likely from above  -Continue to supplement oxygen to keep saturation above 90 to 92%  - CT of the chest report as below  1. No pulmonary emboli 2. Bilateral rond glass patchy opacities nonspecific suggestion of pulmonary edema.  A right  lower lobe 2.3 cm spiculated mass lesion, another more distal lesion 1.7 cm.  These lesions are highly concerning for primary lung neoplasm and recommend biopsy or PET/CT exam.  Lingular atelectasis.  No pneumothorax or pleural effusion.  Mediastinal and hilar lymphadenopathy.  #3 cardiomegaly and coronary artery calcifications.  #4 cirrhotic enlarged liver.  Splenomegaly.  Abdominal ascites.  - afebrile. Negative for cough or fever. Negative leukocytosis. Does not appear to have pneumonia     Continue IV Lasix   - Transthoracic echocardiogram  noted ; out pt ischemic workup   -cardiology team consulted   - s/p EBUS; await lab tests results   -oncology team following the pt      Liver cirrhosis     -Ultrasound noted for subtotal nodular surface contour of the liver with mildly coarsened echotexture raising the possibility of cirrhosis and no focal as well as ascites seen  -HCC  Screening     #Erythrocytosis  -Hematocrit 59.9  - Serum EPO level low      Essential hypertension  - Continue to monitor  - Continue Norvasc     #History of anxiety-continue Xanax and Protonix     #COPD  #Suspected SHANKAR-outpatient sleep study  -Inhalers bronchodilators and IV steroids     Obesity BMI 32.82  - Lifestyle and dietary management        DVT prophylaxis:  Mechanical DVT prophylaxis orders are present.         Code status is   Code Status and Medical Interventions:   Ordered at: 05/07/24 0243     Level Of Support Discussed With:    Patient     Code Status (Patient has no pulse and is not breathing):    CPR (Attempt to Resuscitate)     Medical Interventions (Patient has pulse or is breathing):    Full Support       Plan for disposition: home  in 1-2  days, currently requires IV  diuresis    Time: 35 minutes    Signature: Electronically signed by Bon Dunn MD, 05/12/24, 12:19 EDT.  [facility] Hospitalist Team

## 2024-05-12 NOTE — PLAN OF CARE
Goal Outcome Evaluation:         Pt word bipap machine for awhile today. Pt currently on 5 L highflow oxygen. Daughter has been at bedside. Continue to monitor

## 2024-05-12 NOTE — PROGRESS NOTES
Cardiology [place]        LOS:  LOS: 5 days   Patient Name: Jeanne King  Age/Sex: 64 y.o. female  : 1960  MRN: [id number]    Day of Service: 24   Length of Stay: 5  Encounter Provider: Josh Mendez MD  Place of Service: [facility] CARDIOLOGY  Patient Care Team:  Provider, No Known as PCP - General    Subjective:     Chief Complaint: f/u shortness of breath / edema    Subjective: patient remains on supplemental O2, went for bronch today, metolazone added    Current Medications:   Scheduled Meds:acetaZOLAMIDE (DIAMOX) 250 mg in sterile water (preservative free) 2.5 mL injection, 250 mg, Intravenous, Daily  budesonide, 0.5 mg, Nebulization, BID - RT  FLUoxetine, 10 mg, Oral, Daily  FLUoxetine, 20 mg, Oral, Daily  folic acid, 1 mg, Oral, Daily  furosemide, 40 mg, Intravenous, Daily  insulin lispro, 2-7 Units, Subcutaneous, 4x Daily AC & at Bedtime  ipratropium-albuterol, 3 mL, Nebulization, 4x Daily - RT  methylPREDNISolone sodium succinate, 40 mg, Intravenous, Q12H  metoprolol succinate XL, 25 mg, Oral, Q12H  nicotine, 1 patch, Transdermal, Q24H  potassium chloride, 20 mEq, Oral, BID With Meals  sodium chloride, 10 mL, Intravenous, Q12H      Continuous Infusions:     Allergies:  Allergies   Allergen Reactions    Keflex [Cephalexin] Mental Status Change and Provider Review Needed    Morphine Mental Status Change    Premarin [Conjugated Estrogens] Mental Status Change       Review of Systems   Constitutional: Positive for malaise/fatigue. Negative for chills and diaphoresis.   Cardiovascular:  Positive for dyspnea on exertion and leg swelling. Negative for chest pain, irregular heartbeat, near-syncope, orthopnea, palpitations, paroxysmal nocturnal dyspnea and syncope.   Respiratory:  Negative for cough, shortness of breath, sleep disturbances due to breathing and sputum production.    Gastrointestinal:  Negative for change in bowel habit.   Genitourinary:  Negative for  urgency.   Neurological:  Negative for dizziness and headaches.   Psychiatric/Behavioral:  Negative for altered mental status.          Objective:     Temp:  [98.2 °F (36.8 °C)-98.8 °F (37.1 °C)] 98.7 °F (37.1 °C)  Heart Rate:  [50-61] 52  Resp:  [12-24] 20  BP: (124-142)/(69-81) 142/81     Intake/Output Summary (Last 24 hours) at 5/12/2024 1235  Last data filed at 5/12/2024 1034  Gross per 24 hour   Intake 960 ml   Output 1490 ml   Net -530 ml     Body mass index is 33.65 kg/m².      05/10/24  0500 05/10/24  0743 05/11/24  0500   Weight: 102 kg (224 lb 13.9 oz) 96.2 kg (212 lb) 100 kg (221 lb 5.5 oz)         General Appearance:    Alert, cooperative, in no acute distress                                Head: Atraumatic, normocephalic, PERRLA               Neck:   supple, no JVD   Lungs:     Supplemental O2, dim bilat bases    Heart:    Regular rhythm and normal rate, normal S1 and S2   Abdomen:     Normal bowel sounds, no masses, no organomegaly, soft  nontender, nondistended, no guarding, no rebound  tenderness   Extremities:   Moves all extremities well, edema, no cyanosis, no  redness   Pulses:   Pulses palpable and equal bilaterally   Skin:   No bleeding, bruising or rash   Neurologic:   Awake, alert, oriented x3         Lab Review:   Results from last 7 days   Lab Units 05/12/24  0415 05/11/24  0414 05/11/24  0300 05/10/24  0551 05/08/24  0618 05/07/24  0419   SODIUM mmol/L 136 136  --  137   < > 140   POTASSIUM mmol/L 4.5 4.1  --  5.0   < > 3.8   CHLORIDE mmol/L 90* 92*  --  94*   < > 99   CO2 mmol/L 40.0* 40.0*  --  36.0*   < > 31.0*   BUN mg/dL 28* 28*  --  27*   < > 12   CREATININE mg/dL 0.64 0.71   < > 0.71   < > 0.59   GLUCOSE mg/dL 116* 235*  --  123*   < > 102*   CALCIUM mg/dL 9.5 9.3  --  9.2   < > 8.3*   AST (SGOT) U/L  --   --   --  36*  --  19   ALT (SGPT) U/L  --   --   --  45*  --  16    < > = values in this interval not displayed.     Results from last 7 days   Lab Units 05/08/24  1016  05/08/24  0618   HSTROP T ng/L 22* 19*     Results from last 7 days   Lab Units 05/12/24  0415 05/11/24  0414   WBC 10*3/mm3 10.66 8.42   HEMOGLOBIN g/dL 17.8* 17.5*   HEMATOCRIT % 58.7* 58.4*   PLATELETS 10*3/mm3 102* 105*         Results from last 7 days   Lab Units 05/10/24  0551 05/09/24  0308   MAGNESIUM mg/dL 1.9 1.9     Results from last 7 days   Lab Units 05/07/24  0419   CHOLESTEROL mg/dL 81   TRIGLYCERIDES mg/dL 60   HDL CHOL mg/dL 34*     Results from last 7 days   Lab Units 05/07/24  0419   PROBNP pg/mL 1,213.0*     Results from last 7 days   Lab Units 05/07/24  0419   TSH uIU/mL 0.611       Recent Radiology:  Imaging Results (Most Recent)       Procedure Component Value Units Date/Time    XR Chest 1 View [[id number]] Collected: 05/11/24 1119     Updated: 05/11/24 1124    Narrative:      XR CHEST 1 VW    Date of Exam: 5/11/2024 6:03 AM EDT    Indication: H/O CHF    Comparison: 5/10/2024    Findings:  Cardiomegaly is stable. There is persistent pulmonary vascular congestion. Pulmonary interstitial edema appears improved. There is improving bibasilar airspace disease. No pleural effusion. No pneumothorax. Bony structures are unremarkable.      Impression:      Impression:    1. Stable cardiomegaly.  2. Slight improvement in bibasilar airspace disease.      Electronically Signed: Noble Hernandez MD    5/11/2024 11:22 AM EDT    Workstation ID: [id number]    XR Chest 1 View [[id number]] Collected: 05/10/24 0920     Updated: 05/10/24 0924    Narrative:      XR CHEST 1 VW    Date of Exam: 5/10/2024 9:11 AM EDT    Indication: post op    Comparison: None available.    Findings:  Cardiac silhouette is enlarged. Pulmonary vasculature is indistinct. Scattered bibasilar atelectasis is present. No significant pleural effusion or pneumothorax. No acute osseous lesion is seen.      Impression:      Impression:  1.Cardiomegaly with pulmonary vascular congestion and scattered bibasilar atelectasis.      Electronically Signed:  "Frankie Matson MD    5/10/2024 9:22 AM EDT    Workstation ID: [id number]    CT Outside Films [[id number]] Resulted: 05/08/24 0935     Updated: 05/08/24 0935    Narrative:      This procedure was auto-finalized with no dictation required.    XR Outside Films [[id number]] Resulted: 05/08/24 0935     Updated: 05/08/24 0935    Narrative:      This procedure was auto-finalized with no dictation required.    US Liver [[id number]] Collected: 05/07/24 0731     Updated: 05/07/24 0735    Narrative:      DATE OF EXAM:  5/7/2024 4:35 AM     PROCEDURE:  US LIVER-     INDICATIONS:  ascites, eval for cirrhosis     COMPARISON:  No Comparisons Available     TECHNIQUE:   Grayscale and color Doppler ultrasound evaluation of the limited abdomen  was performed.     FINDINGS:  The liver is enlarged, 20.0 cm in length. The liver echotexture is  coarsened. There is a subtle nodular surface contour of the liver  suggestive of cirrhosis. No focal liver lesion is identified. There is a  \"starry eric\" pattern of the liver parenchyma, which may also reflect  changes of chronic liver disease. No ascites is evident. Main portal  vein is patent with hepatopetal flow. The imaged hepatic veins are  patent. Common bile duct caliber is within normal limits, 6 mm. No  intrahepatic biliary ductal dilation is observed. The intrahepatic IVC  demonstrates color flow.       Impression:      1. Hepatomegaly.  2. Subtle nodular surface contour of the liver with mildly coarsened  echotexture, raising the possibility of cirrhosis.  3. No focal liver lesion is seen.  4. No ascites is seen.     Electronically Signed By-Jane Woodruff MD On:5/7/2024 7:33 AM               ECHOCARDIOGRAM:    Results for orders placed during the hospital encounter of 05/07/24    Adult Transthoracic Echo Complete W/ Cont if Necessary Per Protocol    Interpretation Summary    Left ventricular systolic function is normal. Left ventricular ejection fraction appears to be 56 - 60%.    Left " ventricular diastolic function is consistent with (grade I) impaired relaxation.    Mildly reduced right ventricular systolic function noted.    The right ventricular cavity is moderately dilated.    Estimated right ventricular systolic pressure from tricuspid regurgitation is normal (<35 mmHg).    IVC is dilated        I reviewed the patient's new clinical results.    EKG:      Assessment:       Acute hypoxic respiratory failure    Lung mass    Acute hypoxic and hypercapnic respiratory insufficiency  Volume overload / HFpEF / RV dysfunction  HTN  Tobacco abuse  Abnormal CT at OSH with Concern for primary lung neoplasm with 2 lung lesions identified with mediastinal and hilar lymphadenopathy and cirrhotic changes of liver  Polycythemia       Plan:   Patient admitted with shortness of breath, lower extremity edema, long term smoker, has not seen physician in a number of years, CT at [facility] with concern for lung neoplasm and hilar lymphadenopathy  She is overloaded, continue IV diuresis- added diamox for alkalosis and metolazone added  Stress test as outpt once improves from lung standpoint / on less O2  Needs SHANKAR work up as outpt      Patient is seen and examined and findings are verified.  All data is reviewed by me personally.  Assessment and plan formulated by APC was done after discussion with attending.  I spent more than 50% of time in taking care of the patient.    MDM:    1.  Shortness of breath:    Patient underwent bronchoscopy yesterday and mucous plugs were noted.  Patient is slowly getting better but still requiring high oxygen    2.  COPD exacerbation:    Patient is on nebulization treatment.  Patient is on Solu-Medrol current treatment would be continued    3.  Congestive heart failure acute on chronic diastolic:    Diamox given.  I will switch Lasix to p.o. today.    4.  Bilateral leg edema:    Patient leg edema has improved dramatically    5.  Hypertension:    Blood pressure is controlled on  metoprolol.    Electronically signed by Josh Mendez MD, 05/12/24, 1:10 PM EDT.              Josh Mendez MD  05/12/24  12:35 EDT

## 2024-05-13 LAB
ALBUMIN SERPL-MCNC: 3.7 G/DL (ref 3.5–5.2)
ALBUMIN/GLOB SERPL: 1.4 G/DL
ALP SERPL-CCNC: 91 U/L (ref 39–117)
ALT SERPL W P-5'-P-CCNC: 51 U/L (ref 1–33)
AMMONIA BLD-SCNC: 46 UMOL/L (ref 11–51)
ANION GAP SERPL CALCULATED.3IONS-SCNC: 7 MMOL/L (ref 5–15)
AST SERPL-CCNC: 15 U/L (ref 1–32)
BACTERIA UR QL AUTO: ABNORMAL /HPF
BILIRUB SERPL-MCNC: 2.7 MG/DL (ref 0–1.2)
BILIRUB UR QL STRIP: NEGATIVE
BUN SERPL-MCNC: 22 MG/DL (ref 8–23)
BUN/CREAT SERPL: 36.1 (ref 7–25)
CALCIUM SPEC-SCNC: 9.6 MG/DL (ref 8.6–10.5)
CHLORIDE SERPL-SCNC: 93 MMOL/L (ref 98–107)
CLARITY UR: ABNORMAL
CO2 SERPL-SCNC: 38 MMOL/L (ref 22–29)
COLOR UR: YELLOW
CREAT SERPL-MCNC: 0.61 MG/DL (ref 0.57–1)
EGFRCR SERPLBLD CKD-EPI 2021: 99.4 ML/MIN/1.73
GLOBULIN UR ELPH-MCNC: 2.7 GM/DL
GLUCOSE BLDC GLUCOMTR-MCNC: 131 MG/DL (ref 70–105)
GLUCOSE BLDC GLUCOMTR-MCNC: 137 MG/DL (ref 70–105)
GLUCOSE BLDC GLUCOMTR-MCNC: 152 MG/DL (ref 70–105)
GLUCOSE BLDC GLUCOMTR-MCNC: 182 MG/DL (ref 70–105)
GLUCOSE SERPL-MCNC: 128 MG/DL (ref 65–99)
GLUCOSE UR STRIP-MCNC: NEGATIVE MG/DL
HGB UR QL STRIP.AUTO: NEGATIVE
HYALINE CASTS UR QL AUTO: ABNORMAL /LPF
KETONES UR QL STRIP: NEGATIVE
LEUKOCYTE ESTERASE UR QL STRIP.AUTO: ABNORMAL
NITRITE UR QL STRIP: NEGATIVE
PH UR STRIP.AUTO: 8.5 [PH] (ref 5–8)
POTASSIUM SERPL-SCNC: 4 MMOL/L (ref 3.5–5.2)
PROT SERPL-MCNC: 6.4 G/DL (ref 6–8.5)
PROT UR QL STRIP: NEGATIVE
QT INTERVAL: 431 MS
QTC INTERVAL: 386 MS
RBC # UR STRIP: ABNORMAL /HPF
REF LAB TEST METHOD: ABNORMAL
SODIUM SERPL-SCNC: 138 MMOL/L (ref 136–145)
SP GR UR STRIP: 1.01 (ref 1–1.03)
SQUAMOUS #/AREA URNS HPF: ABNORMAL /HPF
UROBILINOGEN UR QL STRIP: ABNORMAL
WBC # UR STRIP: ABNORMAL /HPF

## 2024-05-13 PROCEDURE — 82948 REAGENT STRIP/BLOOD GLUCOSE: CPT

## 2024-05-13 PROCEDURE — 97162 PT EVAL MOD COMPLEX 30 MIN: CPT

## 2024-05-13 PROCEDURE — 82948 REAGENT STRIP/BLOOD GLUCOSE: CPT | Performed by: STUDENT IN AN ORGANIZED HEALTH CARE EDUCATION/TRAINING PROGRAM

## 2024-05-13 PROCEDURE — 94799 UNLISTED PULMONARY SVC/PX: CPT

## 2024-05-13 PROCEDURE — 94660 CPAP INITIATION&MGMT: CPT

## 2024-05-13 PROCEDURE — 63710000001 INSULIN LISPRO (HUMAN) PER 5 UNITS: Performed by: STUDENT IN AN ORGANIZED HEALTH CARE EDUCATION/TRAINING PROGRAM

## 2024-05-13 PROCEDURE — 81001 URINALYSIS AUTO W/SCOPE: CPT | Performed by: NURSE PRACTITIONER

## 2024-05-13 PROCEDURE — 80053 COMPREHEN METABOLIC PANEL: CPT | Performed by: NURSE PRACTITIONER

## 2024-05-13 PROCEDURE — 25010000002 ACETAZOLAMIDE PER 500 MG: Performed by: NURSE PRACTITIONER

## 2024-05-13 PROCEDURE — 99232 SBSQ HOSP IP/OBS MODERATE 35: CPT | Performed by: NURSE PRACTITIONER

## 2024-05-13 PROCEDURE — 94761 N-INVAS EAR/PLS OXIMETRY MLT: CPT

## 2024-05-13 PROCEDURE — 82140 ASSAY OF AMMONIA: CPT | Performed by: NURSE PRACTITIONER

## 2024-05-13 PROCEDURE — 94664 DEMO&/EVAL PT USE INHALER: CPT

## 2024-05-13 PROCEDURE — 25010000002 METHYLPREDNISOLONE PER 40 MG: Performed by: STUDENT IN AN ORGANIZED HEALTH CARE EDUCATION/TRAINING PROGRAM

## 2024-05-13 RX ADMIN — ANTI-FUNGAL POWDER MICONAZOLE NITRATE TALC FREE 1 APPLICATION: 1.42 POWDER TOPICAL at 13:13

## 2024-05-13 RX ADMIN — BUDESONIDE 0.5 MG: 0.5 INHALANT RESPIRATORY (INHALATION) at 18:31

## 2024-05-13 RX ADMIN — FLUOXETINE 20 MG: 20 CAPSULE ORAL at 08:54

## 2024-05-13 RX ADMIN — INSULIN LISPRO 2 UNITS: 100 INJECTION, SOLUTION INTRAVENOUS; SUBCUTANEOUS at 11:26

## 2024-05-13 RX ADMIN — Medication 10 ML: at 08:59

## 2024-05-13 RX ADMIN — IPRATROPIUM BROMIDE AND ALBUTEROL SULFATE 3 ML: .5; 3 SOLUTION RESPIRATORY (INHALATION) at 14:40

## 2024-05-13 RX ADMIN — ACETAZOLAMIDE 250 MG: 500 INJECTION, POWDER, LYOPHILIZED, FOR SOLUTION INTRAVENOUS at 08:52

## 2024-05-13 RX ADMIN — FLUOXETINE 10 MG: 10 CAPSULE ORAL at 08:53

## 2024-05-13 RX ADMIN — IPRATROPIUM BROMIDE AND ALBUTEROL SULFATE 3 ML: .5; 3 SOLUTION RESPIRATORY (INHALATION) at 18:37

## 2024-05-13 RX ADMIN — FOLIC ACID 1 MG: 1 TABLET ORAL at 08:53

## 2024-05-13 RX ADMIN — POTASSIUM CHLORIDE 20 MEQ: 1500 TABLET, EXTENDED RELEASE ORAL at 08:53

## 2024-05-13 RX ADMIN — METHYLPREDNISOLONE SODIUM SUCCINATE 40 MG: 40 INJECTION, POWDER, FOR SOLUTION INTRAMUSCULAR; INTRAVENOUS at 08:54

## 2024-05-13 RX ADMIN — METHYLPREDNISOLONE SODIUM SUCCINATE 40 MG: 40 INJECTION, POWDER, FOR SOLUTION INTRAMUSCULAR; INTRAVENOUS at 21:03

## 2024-05-13 RX ADMIN — FUROSEMIDE 40 MG: 40 TABLET ORAL at 08:53

## 2024-05-13 RX ADMIN — Medication 10 ML: at 21:04

## 2024-05-13 RX ADMIN — ANTI-FUNGAL POWDER MICONAZOLE NITRATE TALC FREE 1 APPLICATION: 1.42 POWDER TOPICAL at 21:03

## 2024-05-13 RX ADMIN — METOPROLOL SUCCINATE 25 MG: 25 TABLET, EXTENDED RELEASE ORAL at 21:03

## 2024-05-13 RX ADMIN — INSULIN LISPRO 2 UNITS: 100 INJECTION, SOLUTION INTRAVENOUS; SUBCUTANEOUS at 17:09

## 2024-05-13 RX ADMIN — POTASSIUM CHLORIDE 20 MEQ: 1500 TABLET, EXTENDED RELEASE ORAL at 17:09

## 2024-05-13 RX ADMIN — IPRATROPIUM BROMIDE AND ALBUTEROL SULFATE 3 ML: .5; 3 SOLUTION RESPIRATORY (INHALATION) at 06:24

## 2024-05-13 RX ADMIN — METOPROLOL SUCCINATE 25 MG: 25 TABLET, EXTENDED RELEASE ORAL at 08:53

## 2024-05-13 RX ADMIN — IPRATROPIUM BROMIDE AND ALBUTEROL SULFATE 3 ML: .5; 3 SOLUTION RESPIRATORY (INHALATION) at 10:35

## 2024-05-13 RX ADMIN — BUDESONIDE 0.5 MG: 0.5 INHALANT RESPIRATORY (INHALATION) at 06:28

## 2024-05-13 NOTE — PROGRESS NOTES
Cardiology [place]        LOS:  LOS: 6 days   Patient Name: Jeanne King  Age/Sex: 64 y.o. female  : 1960  MRN: [id number]    Day of Service: 24   Length of Stay: 6  Encounter Provider: FRANCIS Wills  Place of Service: [facility] CARDIOLOGY  Patient Care Team:  Provider, No Known as PCP - General    Subjective:     Chief Complaint: f/u shortness of breath / edema    Subjective: patient remains on supplemental O2, daughter at bedside, concerned about intermittent confusion patient has been experiencing   Daughter requesting ammonia level be checked and urine for UTI  She does have foul smelling urine  ABG yesterday PCO2 in the 70s which is down from prior ABGs    Current Medications:   Scheduled Meds:acetaZOLAMIDE (DIAMOX) 250 mg in sterile water (preservative free) 2.5 mL injection, 250 mg, Intravenous, Daily  budesonide, 0.5 mg, Nebulization, BID - RT  FLUoxetine, 10 mg, Oral, Daily  FLUoxetine, 20 mg, Oral, Daily  folic acid, 1 mg, Oral, Daily  furosemide, 40 mg, Oral, Daily  insulin lispro, 2-7 Units, Subcutaneous, 4x Daily AC & at Bedtime  ipratropium-albuterol, 3 mL, Nebulization, 4x Daily - RT  methylPREDNISolone sodium succinate, 40 mg, Intravenous, Q12H  metoprolol succinate XL, 25 mg, Oral, Q12H  miconazole, 1 Application, Topical, Q12H  nicotine, 1 patch, Transdermal, Q24H  potassium chloride, 20 mEq, Oral, BID With Meals  sodium chloride, 10 mL, Intravenous, Q12H      Continuous Infusions:     Allergies:  Allergies   Allergen Reactions    Keflex [Cephalexin] Mental Status Change and Provider Review Needed    Morphine Mental Status Change    Premarin [Conjugated Estrogens] Mental Status Change       Review of Systems   Constitutional: Positive for malaise/fatigue. Negative for chills and diaphoresis.   Cardiovascular:  Positive for dyspnea on exertion. Negative for chest pain, irregular heartbeat, leg swelling, near-syncope, orthopnea, palpitations,  paroxysmal nocturnal dyspnea and syncope.   Respiratory:  Negative for cough, shortness of breath, sleep disturbances due to breathing and sputum production.    Gastrointestinal:  Negative for change in bowel habit.   Genitourinary:  Negative for urgency.   Neurological:  Negative for dizziness and headaches.   Psychiatric/Behavioral:  Negative for altered mental status.          Objective:     Temp:  [97.6 °F (36.4 °C)-98.5 °F (36.9 °C)] 98.5 °F (36.9 °C)  Heart Rate:  [50-66] 54  Resp:  [11-24] 16  BP: (118-146)/(59-85) 127/68     Intake/Output Summary (Last 24 hours) at 5/13/2024 1145  Last data filed at 5/13/2024 1100  Gross per 24 hour   Intake 460 ml   Output 2200 ml   Net -1740 ml     Body mass index is 33.65 kg/m².      05/10/24  0500 05/10/24  0743 05/11/24  0500   Weight: 102 kg (224 lb 13.9 oz) 96.2 kg (212 lb) 100 kg (221 lb 5.5 oz)         General Appearance:    Alert, cooperative, in no acute distress                                Head: Atraumatic, normocephalic, PERRLA               Neck:   supple, no JVD   Lungs:     Supplemental O2, dim bilat bases    Heart:    Regular rhythm and normal rate, normal S1 and S2   Abdomen:     Normal bowel sounds, no masses, no organomegaly, soft  nontender, nondistended, no guarding, no rebound  tenderness   Extremities:   Moves all extremities well, edema, no cyanosis, no  redness   Pulses:   Pulses palpable and equal bilaterally   Skin:   No bleeding, bruising or rash   Neurologic:   Awake, alert, oriented x3, intermittent confusion         Lab Review:   Results from last 7 days   Lab Units 05/12/24  0415 05/11/24  0414 05/11/24  0300 05/10/24  0551 05/08/24  0618 05/07/24  0419   SODIUM mmol/L 136 136  --  137   < > 140   POTASSIUM mmol/L 4.5 4.1  --  5.0   < > 3.8   CHLORIDE mmol/L 90* 92*  --  94*   < > 99   CO2 mmol/L 40.0* 40.0*  --  36.0*   < > 31.0*   BUN mg/dL 28* 28*  --  27*   < > 12   CREATININE mg/dL 0.64 0.71   < > 0.71   < > 0.59   GLUCOSE mg/dL 116*  235*  --  123*   < > 102*   CALCIUM mg/dL 9.5 9.3  --  9.2   < > 8.3*   AST (SGOT) U/L  --   --   --  36*  --  19   ALT (SGPT) U/L  --   --   --  45*  --  16    < > = values in this interval not displayed.     Results from last 7 days   Lab Units 05/08/24  1016 05/08/24  0618   HSTROP T ng/L 22* 19*     Results from last 7 days   Lab Units 05/12/24  0415 05/11/24  0414   WBC 10*3/mm3 10.66 8.42   HEMOGLOBIN g/dL 17.8* 17.5*   HEMATOCRIT % 58.7* 58.4*   PLATELETS 10*3/mm3 102* 105*         Results from last 7 days   Lab Units 05/10/24  0551 05/09/24  0308   MAGNESIUM mg/dL 1.9 1.9     Results from last 7 days   Lab Units 05/07/24  0419   CHOLESTEROL mg/dL 81   TRIGLYCERIDES mg/dL 60   HDL CHOL mg/dL 34*     Results from last 7 days   Lab Units 05/07/24  0419   PROBNP pg/mL 1,213.0*     Results from last 7 days   Lab Units 05/07/24  0419   TSH uIU/mL 0.611       Recent Radiology:  Imaging Results (Most Recent)       Procedure Component Value Units Date/Time    XR Chest 1 View [[id number]] Collected: 05/11/24 1119     Updated: 05/11/24 1124    Narrative:      XR CHEST 1 VW    Date of Exam: 5/11/2024 6:03 AM EDT    Indication: H/O CHF    Comparison: 5/10/2024    Findings:  Cardiomegaly is stable. There is persistent pulmonary vascular congestion. Pulmonary interstitial edema appears improved. There is improving bibasilar airspace disease. No pleural effusion. No pneumothorax. Bony structures are unremarkable.      Impression:      Impression:    1. Stable cardiomegaly.  2. Slight improvement in bibasilar airspace disease.      Electronically Signed: Noble Hernandez MD    5/11/2024 11:22 AM EDT    Workstation ID: [id number]    XR Chest 1 View [[id number]] Collected: 05/10/24 0920     Updated: 05/10/24 0924    Narrative:      XR CHEST 1 VW    Date of Exam: 5/10/2024 9:11 AM EDT    Indication: post op    Comparison: None available.    Findings:  Cardiac silhouette is enlarged. Pulmonary vasculature is indistinct. Scattered  "bibasilar atelectasis is present. No significant pleural effusion or pneumothorax. No acute osseous lesion is seen.      Impression:      Impression:  1.Cardiomegaly with pulmonary vascular congestion and scattered bibasilar atelectasis.      Electronically Signed: Frankie Matson MD    5/10/2024 9:22 AM EDT    Workstation ID: [id number]    CT Outside Films [[id number]] Resulted: 05/08/24 0935     Updated: 05/08/24 0935    Narrative:      This procedure was auto-finalized with no dictation required.    XR Outside Films [[id number]] Resulted: 05/08/24 0935     Updated: 05/08/24 0935    Narrative:      This procedure was auto-finalized with no dictation required.    US Liver [[id number]] Collected: 05/07/24 0731     Updated: 05/07/24 0735    Narrative:      DATE OF EXAM:  5/7/2024 4:35 AM     PROCEDURE:  US LIVER-     INDICATIONS:  ascites, eval for cirrhosis     COMPARISON:  No Comparisons Available     TECHNIQUE:   Grayscale and color Doppler ultrasound evaluation of the limited abdomen  was performed.     FINDINGS:  The liver is enlarged, 20.0 cm in length. The liver echotexture is  coarsened. There is a subtle nodular surface contour of the liver  suggestive of cirrhosis. No focal liver lesion is identified. There is a  \"starry eric\" pattern of the liver parenchyma, which may also reflect  changes of chronic liver disease. No ascites is evident. Main portal  vein is patent with hepatopetal flow. The imaged hepatic veins are  patent. Common bile duct caliber is within normal limits, 6 mm. No  intrahepatic biliary ductal dilation is observed. The intrahepatic IVC  demonstrates color flow.       Impression:      1. Hepatomegaly.  2. Subtle nodular surface contour of the liver with mildly coarsened  echotexture, raising the possibility of cirrhosis.  3. No focal liver lesion is seen.  4. No ascites is seen.     Electronically Signed By-Jane Woodruff MD On:5/7/2024 7:33 AM               ECHOCARDIOGRAM:    Results for orders " placed during the hospital encounter of 05/07/24    Adult Transthoracic Echo Complete W/ Cont if Necessary Per Protocol    Interpretation Summary    Left ventricular systolic function is normal. Left ventricular ejection fraction appears to be 56 - 60%.    Left ventricular diastolic function is consistent with (grade I) impaired relaxation.    Mildly reduced right ventricular systolic function noted.    The right ventricular cavity is moderately dilated.    Estimated right ventricular systolic pressure from tricuspid regurgitation is normal (<35 mmHg).    IVC is dilated        I reviewed the patient's new clinical results.    EKG:      Assessment:       Acute hypoxic respiratory failure    Lung mass    Acute hypoxic and hypercapnic respiratory insufficiency  Volume overload / HFpEF / RV dysfunction  HTN  Tobacco abuse  Abnormal CT at OSH with Concern for primary lung neoplasm with 2 lung lesions identified with mediastinal and hilar lymphadenopathy and cirrhotic changes of liver  Polycythemia  Intermittent confusion       Plan:   Patient admitted with shortness of breath, lower extremity edema, long term smoker, has not seen physician in a number of years, CT at [facility] with concern for lung neoplasm and hilar lymphadenopathy  Volume status improving  Stress test as outpt once improves from lung standpoint / on less O2  Needs SHANKAR work up as outpt      Family concerned about intermittent confusion, want UTI ruled out and ammonia checked, will check CMP also            Rosmery Dunn, FRANCIS  05/13/24  11:45 EDT

## 2024-05-13 NOTE — PLAN OF CARE
Goal Outcome Evaluation:  Plan of Care Reviewed With: patient, daughter        Progress: improving   Pt is a 65 y/o F admitted to [facility] on 5/7/24 with complaints of feeling unwell for a long time, BLE swelling and weeping, and acute respiratory hypoxia. CT Chest (+) for multiple lung lesions and US (+) for liver cirrhosis. Admitted to [facility] for acute hypoxemic respiratory failure, acute pulmonary edema, and CHF exacerbation. She is currently living at home alone, but planning on returning back to daughter's house in [facility] with two steps to enter. At baseline, she is normally IND with household mobility and ADLs with no AD. No home O2. Daughter has been assisting with cooking and cleaning recently. This date, she is AAOx4 and sitting up in recliner with no complaints of pain reading 90% on 5L. She completes transfers SBA and amb 100' CGA with no AD. O2 sats remained stable this date, but pt still requiring 5-6L supplemental O2. Exhibits decreased endurance this date, but seems to be general deconditioning at baseline. Pt would likely benefit from O2 walking test prior to d/c as she continues to need supplemental O2 at rest and with activity. PT is recommending HHPT at d/c and will continue to follow during stay.     Anticipated Discharge Disposition (PT): home with home health

## 2024-05-13 NOTE — PLAN OF CARE
Goal Outcome Evaluation:      Patient has been on 4-5L n/c. Patient ambulated hallways with PT. Patient has been up to the chair and bathroom multiple times. Goal to continue to wean down oxygen.       Problem: Adult Inpatient Plan of Care  Goal: Plan of Care Review  Outcome: Ongoing, Progressing  Goal: Patient-Specific Goal (Individualized)  Outcome: Ongoing, Progressing  Goal: Absence of Hospital-Acquired Illness or Injury  Outcome: Ongoing, Progressing  Intervention: Identify and Manage Fall Risk  Recent Flowsheet Documentation  Taken 5/13/2024 1200 by Lucy Brown RN  Safety Promotion/Fall Prevention:   activity supervised   clutter free environment maintained   nonskid shoes/slippers when out of bed   safety round/check completed  Taken 5/13/2024 1000 by Lucy Brown RN  Safety Promotion/Fall Prevention:   activity supervised   clutter free environment maintained   nonskid shoes/slippers when out of bed   safety round/check completed  Taken 5/13/2024 0800 by Lucy Brown RN  Safety Promotion/Fall Prevention:   activity supervised   clutter free environment maintained   nonskid shoes/slippers when out of bed   safety round/check completed  Intervention: Prevent Skin Injury  Recent Flowsheet Documentation  Taken 5/13/2024 1200 by Lucy Brown RN  Skin Protection: incontinence pads utilized  Taken 5/13/2024 0800 by Lucy Brown RN  Skin Protection: incontinence pads utilized  Intervention: Prevent and Manage VTE (Venous Thromboembolism) Risk  Recent Flowsheet Documentation  Taken 5/13/2024 0800 by Lucy Brown RN  Activity Management: up in chair  Intervention: Prevent Infection  Recent Flowsheet Documentation  Taken 5/13/2024 1200 by Lucy Brown RN  Infection Prevention:   hand hygiene promoted   personal protective equipment utilized  Taken 5/13/2024 1000 by Lucy Brown RN  Infection Prevention:   hand hygiene promoted   personal protective equipment  utilized  Taken 5/13/2024 0800 by Lucy Brown, RN  Infection Prevention:   hand hygiene promoted   personal protective equipment utilized  Goal: Optimal Comfort and Wellbeing  Outcome: Ongoing, Progressing  Goal: Readiness for Transition of Care  Outcome: Ongoing, Progressing

## 2024-05-13 NOTE — THERAPY EVALUATION
Patient Name: Jeanne King  : 1960    MRN: [id number]                              Today's Date: 2024       Admit Date: 2024    Visit Dx:     ICD-10-CM ICD-9-CM   1. Lung mass  R91.8 786.6     Patient Active Problem List   Diagnosis    Acute hypoxic respiratory failure    Lung mass     Past Medical History:   Diagnosis Date    COPD (chronic obstructive pulmonary disease)     Hypertension      Past Surgical History:   Procedure Laterality Date    BRONCHOSCOPY N/A 5/10/2024    Procedure: BRONCHOSCOPY WITH BRONCHOALVEOLAR LAVAGE AND ENDOBRONCHIAL ULTRASOUND WITH FINE NEEDLE ASPIRATION x2;  Surgeon: Bessy Mcguire MD;  Location: [facility] ENDOSCOPY;  Service: Pulmonary;  Laterality: N/A;  lung mass    HYSTERECTOMY        General Information       Row Name 24 1233          Physical Therapy Time and Intention    Document Type evaluation  -MB     Mode of Treatment physical therapy  -MB       Row Name 24 1233          General Information    Patient Profile Reviewed yes  -MB     Prior Level of Function independent:;all household mobility;community mobility;gait;transfer;ADL's;home management;driving  -MB     Existing Precautions/Restrictions no known precautions/restrictions  -MB     Barriers to Rehab none identified  -MB       Row Name 24 1233          Living Environment    People in Home alone  -MB       Row Name 24 1233          Home Main Entrance    Number of Stairs, Main Entrance two  -MB       Row Name 24 1233          Stairs Within Home, Primary    Number of Stairs, Within Home, Primary none  -MB       Row Name 24 1233          Cognition    Orientation Status (Cognition) oriented x 4  -MB       Row Name 24 1233          Safety Issues, Functional Mobility    Impairments Affecting Function (Mobility) endurance/activity tolerance;strength  -MB               User Key  (r) = Recorded By, (t) = Taken By, (c) = Cosigned By      Initials Name Provider Type    MB  Austin Raymundo, PT Physical Therapist                   Mobility       Row Name 05/13/24 1234          Bed Mobility    Bed Mobility bed mobility (all) activities  -MB     All Activities, [place] (Bed Mobility) not tested  -MB     Comment, (Bed Mobility) Pt began and ended session sitting in recliner  -MB       Row Name 05/13/24 1234          Bed-Chair Transfer    Bed-Chair Herod (Transfers) standby assist  -MB     Comment, (Bed-Chair Transfer) no AD  -MB       Row Name 05/13/24 1234          Sit-Stand Transfer    Sit-Stand Herod (Transfers) standby assist  -MB     Comment, (Sit-Stand Transfer) no AD  -MB       Row Name 05/13/24 1234          Gait/Stairs (Locomotion)    [place] Level (Gait) contact guard  -MB     Patient was able to Ambulate yes  -MB     Distance in Feet (Gait) 100  -MB     Comment, (Gait/Stairs) 100' CGA with no AD  -MB               User Key  (r) = Recorded By, (t) = Taken By, (c) = Cosigned By      Initials Name Provider Type    MB Austin Raymundo, PT Physical Therapist                   Obj/Interventions       Row Name 05/13/24 1235          Range of Motion Comprehensive    General Range of Motion no range of motion deficits identified  -[facility] Name 05/13/24 1235          Strength Comprehensive (MMT)    Comment, General Manual Muscle Testing (MMT) Assessment BLE Strength 4/5 grossly  -[facility] Name 05/13/24 1235          Balance    Balance Assessment sitting static balance;sitting dynamic balance;sit to stand dynamic balance;standing static balance;standing dynamic balance  -MB     Static Sitting Balance independent  -MB     Dynamic Sitting Balance independent  -MB     Position, Sitting Balance supported;sitting in chair  -MB     Sit to Stand Dynamic Balance contact guard  -MB     Static Standing Balance standby assist  -MB     Dynamic Standing Balance contact guard  -MB       Row Name 05/13/24 1235          Sensory Assessment (Somatosensory)    Sensory Assessment  (Somatosensory) sensation intact  -MB               User Key  (r) = Recorded By, (t) = Taken By, (c) = Cosigned By      Initials Name Provider Type    Austin Mota, PT Physical Therapist                   Goals/Plan       Row Name 05/13/24 1237          Bed Mobility Goal 1 (PT)    Activity/Assistive Device (Bed Mobility Goal 1, PT) bed mobility activities, all  -MB     [place] Level/Cues Needed (Bed Mobility Goal 1, PT) independent  -MB     Time Frame (Bed Mobility Goal 1, PT) long term goal (LTG);2 weeks  -MB       Row Name 05/13/24 1237          Transfer Goal 1 (PT)    Activity/Assistive Device (Transfer Goal 1, PT) transfers, all  -MB     [place] Level/Cues Needed (Transfer Goal 1, PT) independent  -MB     Time Frame (Transfer Goal 1, PT) long term goal (LTG);2 weeks  -MB       Row Name 05/13/24 1237          Gait Training Goal 1 (PT)    Activity/Assistive Device (Gait Training Goal 1, PT) gait (walking locomotion)  -MB     [place] Level (Gait Training Goal 1, PT) standby assist  -MB     Distance (Gait Training Goal 1, PT) 200  -MB     Time Frame (Gait Training Goal 1, PT) long term goal (LTG);2 weeks  -MB       Row Name 05/13/24 1237          Therapy Assessment/Plan (PT)    Planned Therapy Interventions (PT) balance training;bed mobility training;gait training;home exercise program;neuromuscular re-education;patient/family education;strengthening;transfer training  -MB               User Key  (r) = Recorded By, (t) = Taken By, (c) = Cosigned By      Initials Name Provider Type    Austin Mota, PT Physical Therapist                   Clinical Impression       Row Name 05/13/24 1236          Pain    Pretreatment Pain Rating 0/10 - no pain  -MB     Posttreatment Pain Rating 0/10 - no pain  -MB       Row Name 05/13/24 1243 05/13/24 1236       Plan of Care Review    Plan of Care Reviewed With -- patient;daughter  -MB    Progress -- improving  -MB    Outcome Evaluation Pt is a 65 y/o F admitted to  [facility] on 5/7/24 with complaints of feeling unwell for a long time, BLE swelling and weeping, and acute respiratory hypoxia. CT Chest (+) for multiple lung lesions and US (+) for liver cirrhosis. Admitted to [facility] for acute hypoxemic respiratory failure, acute pulmonary edema, and CHF exacerbation. She is currently living at home alone, but planning on returning back to daughter's house in [facility] with two steps to enter. At baseline, she is normally IND with household mobility and ADLs with no AD. No home O2. Daughter has been assisting with cooking and cleaning recently. This date, she is AAOx4 and sitting up in recliner with no complaints of pain reading 90% on 5L. She completes transfers SBA and amb 100' CGA with no AD. O2 sats remained stable this date, but pt still requiring 5-6L supplemental O2. Exhibits decreased endurance this date, but seems to be general deconditioning at baseline. Pt would likely benefit from O2 walking test prior to d/c as she continues to need supplemental O2 at rest and with activity. PT is recommending HHPT at d/c and will continue to follow during stay.  -MB --      Row Name 05/13/24 1236          Therapy Assessment/Plan (PT)    Rehab Potential (PT) good, to achieve stated therapy goals  -MB     Criteria for Skilled Interventions Met (PT) yes;meets criteria  -MB     Therapy Frequency (PT) 3 times/wk  -MB     Predicted Duration of Therapy Intervention (PT) until d/c  -MB       Row Name 05/13/24 1236          Vital Signs    Pretreatment Heart Rate (beats/min) 54  -MB     Posttreatment Heart Rate (beats/min) 52  -MB     Pre SpO2 (%) 90  -MB     O2 Delivery Pre Treatment supplemental O2  5L  -MB     Intra SpO2 (%) 91  -MB     O2 Delivery Intra Treatment supplemental O2  6L  -MB     Post SpO2 (%) 92  -MB     O2 Delivery Post Treatment supplemental O2  5L  -MB     Pre Patient Position Sitting  -MB     Intra Patient Position Standing  -MB     Post Patient Position Sitting  -MB       Row Name  05/13/24 1236          Positioning and Restraints    Pre-Treatment Position sitting in chair/recliner  -MB     Post Treatment Position chair  -MB     In Chair notified nsg;reclined;sitting;call light within reach;encouraged to call for assist;exit alarm on;with family/caregiver  -MB               User Key  (r) = Recorded By, (t) = Taken By, (c) = Cosigned By      Initials Name Provider Type    Austin Mota, PT Physical Therapist                   Outcome Measures       Row Name 05/13/24 1238 05/13/24 0800       How much help from another person do you currently need...    Turning from your back to your side while in flat bed without using bedrails? 4  -MB 4  -LASHELL    Moving from lying on back to sitting on the side of a flat bed without bedrails? 4  -MB 4  -LASHELL    Moving to and from a bed to a chair (including a wheelchair)? 4  -MB 3  -LASHELL    Standing up from a chair using your arms (e.g., wheelchair, bedside chair)? 4  -MB 3  -LASHELL    Climbing 3-5 steps with a railing? 3  -MB 3  -LASHELL    To walk in hospital room? 3  -MB 3  -LASHELL    AM-PAC 6 Clicks Score (PT) 22  -MB 20  -LASHELL    Highest Level of Mobility Goal 7 --> Walk 25 feet or more  -MB 6 --> Walk 10 steps or more  -LASHELL              User Key  (r) = Recorded By, (t) = Taken By, (c) = Cosigned By      Initials Name Provider Type    Lucy Blakely RN Registered Nurse    Austin Mota, PT Physical Therapist                                 Physical Therapy Education       Title: PT OT SLP Therapies (Done)       Topic: Physical Therapy (Done)       Point: Mobility training (Done)       Learning Progress Summary             Patient Acceptance, E,TB, VU by MB at 5/13/2024 1238                         Point: Body mechanics (Done)       Learning Progress Summary             Patient Acceptance, E,TB, VU by MB at 5/13/2024 1238                         Point: Precautions (Done)       Learning Progress Summary             Patient Acceptance, E,TB, VU by MB at 5/13/2024 1238                                          User Key       Initials Effective Dates Name Provider Type Discipline    MB 06/06/23 -  Austin Raymundo, PT Physical Therapist PT                  PT Recommendation and Plan  Planned Therapy Interventions (PT): balance training, bed mobility training, gait training, home exercise program, neuromuscular re-education, patient/family education, strengthening, transfer training  Plan of Care Reviewed With: patient, daughter  Progress: improving  Outcome Evaluation: Pt is a 65 y/o F admitted to [facility] on 5/7/24 with complaints of feeling unwell for a long time, BLE swelling and weeping, and acute respiratory hypoxia. CT Chest (+) for multiple lung lesions and US (+) for liver cirrhosis. Admitted to [facility] for acute hypoxemic respiratory failure, acute pulmonary edema, and CHF exacerbation. She is currently living at home alone, but planning on returning back to daughter's house in [facility] with two steps to enter. At baseline, she is normally IND with household mobility and ADLs with no AD. No home O2. Daughter has been assisting with cooking and cleaning recently. This date, she is AAOx4 and sitting up in recliner with no complaints of pain reading 90% on 5L. She completes transfers SBA and amb 100' CGA with no AD. O2 sats remained stable this date, but pt still requiring 5-6L supplemental O2. Exhibits decreased endurance this date, but seems to be general deconditioning at baseline. Pt would likely benefit from O2 walking test prior to d/c as she continues to need supplemental O2 at rest and with activity. PT is recommending HHPT at d/c and will continue to follow during stay.     Time Calculation:   PT Evaluation Complexity  History, PT Evaluation Complexity: 1-2 personal factors and/or comorbidities  Examination of Body Systems (PT Eval Complexity): total of 3 or more elements  Clinical Presentation (PT Evaluation Complexity): evolving  Clinical Decision Making (PT Evaluation Complexity):  moderate complexity  Overall Complexity (PT Evaluation Complexity): moderate complexity     PT Charges       Row Name 05/13/24 1238             Time Calculation    Start Time 1013  -MB      Stop Time 1036  -MB      Time Calculation (min) 23 min  -MB      PT Received On 05/13/24  -MB      PT - Next Appointment 05/15/24  -MB      PT Goal Re-Cert Due Date 05/27/24  -MB         Time Calculation- PT    Total Timed Code Minutes- PT 0 minute(s)  -MB                User Key  (r) = Recorded By, (t) = Taken By, (c) = Cosigned By      Initials Name Provider Type    Austin Mota, PT Physical Therapist                  Therapy Charges for Today       Code Description Service Date Service Provider Modifiers Qty    19055410645 HC PT EVAL MOD COMPLEXITY 4 5/13/2024 Austin Raymundo, PT GP 1            PT G-Codes  AM-PAC 6 Clicks Score (PT): 22  PT Discharge Summary  Anticipated Discharge Disposition (PT): home with home health    Austin Raymundo, FELIX  5/13/2024

## 2024-05-13 NOTE — CASE MANAGEMENT/SOCIAL WORK
Continued Stay Note  SAAD Brewer     Patient Name: Jeanne Knig  MRN: [id number]  Today's Date: 5/13/2024    Admit Date: 5/7/2024    Plan: Home alone. Watch for O2 needs.   Discharge Plan       Row Name 05/13/24 1738       Plan    Plan Home alone. Watch for O2 needs.    Patient/Family in Agreement with Plan yes    Plan Comments BArriers to discharge: 5L O2.  Changed lasix to oral. IV steroids.  bronch cx pending.             Expected Discharge Date and Time       Expected Discharge Date Expected Discharge Time    May 15, 2024           Yolanda Medina RN    Office Phone [phone]  Office Cell [phone]

## 2024-05-13 NOTE — PROGRESS NOTES
[facility] MEDICINE SERVICE  DAILY PROGRESS NOTE    NAME: Jeanne King  : 1960  MRN: [id number]      LOS: 6 days     PROVIDER OF SERVICE: Stacia Olson MD    Chief Complaint: Acute hypoxic respiratory failure    Subjective:     Interval History:  History taken from: patient  Patient Complaints: Headaches, ABG noted  patient to be on BiPAP overnight   denies: Nausea or vomiting     patient seen and examined, denies any chest pain shortness of breath, she is requiring 5 L of oxygen and does not use any oxygen at home at all.    Review of Systems:   Review of Systems  14 point review of system unremarkable except mentioned above  Objective:     Vital Signs  Temp:  [97.6 °F (36.4 °C)-98.5 °F (36.9 °C)] 98.5 °F (36.9 °C)  Heart Rate:  [50-66] 57  Resp:  [12-24] 14  BP: (118-146)/(59-85) 134/72  Flow (L/min):  [5] 5   Body mass index is 33.65 kg/m².    Physical Exam  Physical Exam  HENT:      Head: Atraumatic.      Mouth/Throat:      Mouth: Mucous membranes are moist.   Eyes:      Pupils: Pupils are equal, round, and reactive to light.   Cardiovascular:      Rate and Rhythm: Normal rate and regular rhythm.      Pulses: Normal pulses.      Heart sounds: Normal heart sounds.   Abdominal:      General: Bowel sounds are normal.      Palpations: Abdomen is soft.   Musculoskeletal:      Right lower leg: Edema present.      Left lower leg: Edema present.   Skin:     General: Skin is warm.   Neurological:      General: No focal deficit present.      Mental Status: She is alert and oriented to person, place, and time.   Psychiatric:         Mood and Affect: Mood normal.         Scheduled Meds   acetaZOLAMIDE (DIAMOX) 250 mg in sterile water (preservative free) 2.5 mL injection, 250 mg, Intravenous, Daily  budesonide, 0.5 mg, Nebulization, BID - RT  FLUoxetine, 10 mg, Oral, Daily  FLUoxetine, 20 mg, Oral, Daily  folic acid, 1 mg, Oral, Daily  furosemide, 40 mg, Oral, Daily  insulin lispro, 2-7 Units,  Subcutaneous, 4x Daily AC & at Bedtime  ipratropium-albuterol, 3 mL, Nebulization, 4x Daily - RT  methylPREDNISolone sodium succinate, 40 mg, Intravenous, Q12H  metoprolol succinate XL, 25 mg, Oral, Q12H  miconazole, 1 Application, Topical, Q12H  nicotine, 1 patch, Transdermal, Q24H  potassium chloride, 20 mEq, Oral, BID With Meals  sodium chloride, 10 mL, Intravenous, Q12H       PRN Meds     acetaminophen    ALPRAZolam    senna-docusate sodium **AND** polyethylene glycol **AND** bisacodyl **AND** bisacodyl    dextrose    dextrose    glucagon (human recombinant)    ipratropium-albuterol    nitroglycerin    ondansetron    sodium chloride    sodium chloride   Infusions         Diagnostic Data    Results from last 7 days   Lab Units 05/12/24  0415 05/11/24  0300 05/10/24  0551   WBC 10*3/mm3 10.66   < > 10.46   HEMOGLOBIN g/dL 17.8*   < > 18.1*   HEMOGLOBIN, POC   --    < >  --    HEMATOCRIT % 58.7*   < > 59.8*   HEMATOCRIT POC   --    < >  --    PLATELETS 10*3/mm3 102*   < > 124*   GLUCOSE mg/dL 116*   < > 123*   CREATININE mg/dL 0.64   < > 0.71   BUN mg/dL 28*   < > 27*   SODIUM mmol/L 136   < > 137   POTASSIUM mmol/L 4.5   < > 5.0   AST (SGOT) U/L  --   --  36*   ALT (SGPT) U/L  --   --  45*   ALK PHOS U/L  --   --  92   BILIRUBIN mg/dL  --   --  0.9   ANION GAP mmol/L 6.0   < > 7.0    < > = values in this interval not displayed.       No radiology results for the last day      I reviewed the patient's new clinical results.    Assessment/Plan:     Active and Resolved Problems  #Acute hypoxemic respiratory failure  #Acute exacerbation of likely diastolic heart failure  #Acute pulmonary edema  # suspicious Malignant lung neoplasm and mediastinal   Lymphadenopathy  #Bilateral lower extremity edema likely from above  -Continue to supplement oxygen to keep saturation above 90 to 92%  - CT of the chest report as below  1. No pulmonary emboli 2. Bilateral rond glass patchy opacities nonspecific suggestion of pulmonary  edema.  A right lower lobe 2.3 cm spiculated mass lesion, another more distal lesion 1.7 cm.  These lesions are highly concerning for primary lung neoplasm and recommend biopsy or PET/CT exam.  Lingular atelectasis.  No pneumothorax or pleural effusion.  Mediastinal and hilar lymphadenopathy.  #3 cardiomegaly and coronary artery calcifications.  #4 cirrhotic enlarged liver.  Splenomegaly.  Abdominal ascites.  - afebrile. Negative for cough or fever. Negative leukocytosis. Does not appear to have pneumonia     Continue IV Lasix --will be transitioned to p.o. per cardiology recommendations.  - Transthoracic echocardiogram  noted ; out pt ischemic workup   -cardiology team  following  - s/p EBUS; await lab tests results   -oncology team following the pt      Liver cirrhosis     -Ultrasound noted for subtotal nodular surface contour of the liver with mildly coarsened echotexture raising the possibility of cirrhosis and no focal as well as ascites seen  -HCC  Screening     #Erythrocytosis  -Hematocrit 59.9  - Serum EPO level low      Essential hypertension  - Continue to monitor  - Continue Norvasc     #History of anxiety-continue Xanax and Protonix     #COPD  #Suspected SHANKAR-outpatient sleep study  -Inhalers bronchodilators and IV steroids     Obesity BMI 32.82  - Lifestyle and dietary management        DVT prophylaxis:  Mechanical DVT prophylaxis orders are present.         Code status is   Code Status and Medical Interventions:   Ordered at: 05/07/24 0243     Level Of Support Discussed With:    Patient     Code Status (Patient has no pulse and is not breathing):    CPR (Attempt to Resuscitate)     Medical Interventions (Patient has pulse or is breathing):    Full Support       Plan for disposition: home  in 1-2  days, currently on 5 L of oxygen and does not have any oxygen at home.  Time: 35 minutes    Signature: Electronically signed by Stacia Olson MD, 05/13/24, 13:52 EDT.  [facility] Hospitalist Team

## 2024-05-13 NOTE — PLAN OF CARE
Goal Outcome Evaluation:      Pt had bipap on throughout the shift, daughter at bedside, pt is sinus bradycardic and asymptomatic, provider on call notified. No voiced complaints at the moment. Call light placed within reach. Will continue to monitor

## 2024-05-14 ENCOUNTER — READMISSION MANAGEMENT (OUTPATIENT)
Dept: CALL CENTER | Facility: HOSPITAL | Age: 64
End: 2024-05-14
Payer: MEDICARE

## 2024-05-14 VITALS
HEART RATE: 58 BPM | BODY MASS INDEX: 32.68 KG/M2 | DIASTOLIC BLOOD PRESSURE: 81 MMHG | WEIGHT: 215.61 LBS | RESPIRATION RATE: 16 BRPM | HEIGHT: 68 IN | OXYGEN SATURATION: 92 % | TEMPERATURE: 98.7 F | SYSTOLIC BLOOD PRESSURE: 132 MMHG

## 2024-05-14 PROBLEM — J44.1 CHRONIC OBSTRUCTIVE PULMONARY DISEASE WITH ACUTE EXACERBATION: Status: ACTIVE | Noted: 2024-05-14

## 2024-05-14 LAB
GLUCOSE BLDC GLUCOMTR-MCNC: 146 MG/DL (ref 70–105)
GLUCOSE BLDC GLUCOMTR-MCNC: 151 MG/DL (ref 70–105)
P JIROVECII DNA L RESP QL NAA+NON-PROBE: NEGATIVE
REF LAB TEST METHOD: NORMAL

## 2024-05-14 PROCEDURE — 25010000002 METHYLPREDNISOLONE PER 40 MG: Performed by: STUDENT IN AN ORGANIZED HEALTH CARE EDUCATION/TRAINING PROGRAM

## 2024-05-14 PROCEDURE — 94660 CPAP INITIATION&MGMT: CPT

## 2024-05-14 PROCEDURE — 94799 UNLISTED PULMONARY SVC/PX: CPT

## 2024-05-14 PROCEDURE — 82948 REAGENT STRIP/BLOOD GLUCOSE: CPT | Performed by: STUDENT IN AN ORGANIZED HEALTH CARE EDUCATION/TRAINING PROGRAM

## 2024-05-14 PROCEDURE — 94618 PULMONARY STRESS TESTING: CPT

## 2024-05-14 PROCEDURE — 94664 DEMO&/EVAL PT USE INHALER: CPT

## 2024-05-14 PROCEDURE — 25010000002 ACETAZOLAMIDE PER 500 MG: Performed by: NURSE PRACTITIONER

## 2024-05-14 PROCEDURE — 94761 N-INVAS EAR/PLS OXIMETRY MLT: CPT

## 2024-05-14 PROCEDURE — 63710000001 INSULIN LISPRO (HUMAN) PER 5 UNITS: Performed by: STUDENT IN AN ORGANIZED HEALTH CARE EDUCATION/TRAINING PROGRAM

## 2024-05-14 PROCEDURE — 99232 SBSQ HOSP IP/OBS MODERATE 35: CPT | Performed by: INTERNAL MEDICINE

## 2024-05-14 RX ORDER — IPRATROPIUM BROMIDE AND ALBUTEROL SULFATE 2.5; .5 MG/3ML; MG/3ML
3 SOLUTION RESPIRATORY (INHALATION) EVERY 4 HOURS PRN
Qty: 360 ML | Refills: 3 | Status: SHIPPED | OUTPATIENT
Start: 2024-05-14 | End: 2024-05-14

## 2024-05-14 RX ORDER — FUROSEMIDE 40 MG/1
40 TABLET ORAL DAILY
Qty: 30 TABLET | Refills: 0 | Status: SHIPPED | OUTPATIENT
Start: 2024-05-15

## 2024-05-14 RX ORDER — METOPROLOL SUCCINATE 25 MG/1
25 TABLET, EXTENDED RELEASE ORAL EVERY 12 HOURS SCHEDULED
Qty: 60 TABLET | Refills: 0 | Status: SHIPPED | OUTPATIENT
Start: 2024-05-14

## 2024-05-14 RX ORDER — AMOXICILLIN 250 MG
2 CAPSULE ORAL 2 TIMES DAILY PRN
Qty: 30 TABLET | Refills: 0 | Status: SHIPPED | OUTPATIENT
Start: 2024-05-14

## 2024-05-14 RX ORDER — PREDNISONE 10 MG/1
TABLET ORAL
Qty: 30 TABLET | Refills: 0 | Status: SHIPPED | OUTPATIENT
Start: 2024-05-14 | End: 2024-05-26

## 2024-05-14 RX ORDER — ACETAZOLAMIDE 250 MG/1
250 TABLET ORAL DAILY
Status: DISCONTINUED | OUTPATIENT
Start: 2024-05-15 | End: 2024-05-14 | Stop reason: HOSPADM

## 2024-05-14 RX ORDER — FOLIC ACID 1 MG/1
1 TABLET ORAL DAILY
Qty: 30 TABLET | Refills: 0 | Status: SHIPPED | OUTPATIENT
Start: 2024-05-15

## 2024-05-14 RX ORDER — NICOTINE 21 MG/24HR
1 PATCH, TRANSDERMAL 24 HOURS TRANSDERMAL
Qty: 28 EACH | Refills: 0 | Status: SHIPPED | OUTPATIENT
Start: 2024-05-15

## 2024-05-14 RX ORDER — ACETAZOLAMIDE 250 MG/1
250 TABLET ORAL DAILY
Status: DISCONTINUED | OUTPATIENT
Start: 2024-05-14 | End: 2024-05-14

## 2024-05-14 RX ORDER — IPRATROPIUM BROMIDE AND ALBUTEROL SULFATE 2.5; .5 MG/3ML; MG/3ML
3 SOLUTION RESPIRATORY (INHALATION) EVERY 4 HOURS PRN
Qty: 360 ML | Refills: 3 | Status: SHIPPED | OUTPATIENT
Start: 2024-05-14

## 2024-05-14 RX ORDER — ACETAZOLAMIDE 250 MG/1
250 TABLET ORAL DAILY
Qty: 30 TABLET | Refills: 0 | Status: SHIPPED | OUTPATIENT
Start: 2024-05-15

## 2024-05-14 RX ORDER — BUDESONIDE 0.5 MG/2ML
0.5 INHALANT ORAL
Qty: 120 ML | Refills: 0 | Status: SHIPPED | OUTPATIENT
Start: 2024-05-14

## 2024-05-14 RX ORDER — BUDESONIDE 0.5 MG/2ML
0.5 INHALANT ORAL
Qty: 120 ML | Refills: 0 | Status: SHIPPED | OUTPATIENT
Start: 2024-05-14 | End: 2024-05-14

## 2024-05-14 RX ADMIN — METHYLPREDNISOLONE SODIUM SUCCINATE 40 MG: 40 INJECTION, POWDER, FOR SOLUTION INTRAMUSCULAR; INTRAVENOUS at 08:53

## 2024-05-14 RX ADMIN — FLUOXETINE 10 MG: 10 CAPSULE ORAL at 08:53

## 2024-05-14 RX ADMIN — FLUOXETINE 20 MG: 20 CAPSULE ORAL at 08:53

## 2024-05-14 RX ADMIN — ANTI-FUNGAL POWDER MICONAZOLE NITRATE TALC FREE 1 APPLICATION: 1.42 POWDER TOPICAL at 08:54

## 2024-05-14 RX ADMIN — INSULIN LISPRO 2 UNITS: 100 INJECTION, SOLUTION INTRAVENOUS; SUBCUTANEOUS at 07:25

## 2024-05-14 RX ADMIN — IPRATROPIUM BROMIDE AND ALBUTEROL SULFATE 3 ML: .5; 3 SOLUTION RESPIRATORY (INHALATION) at 11:52

## 2024-05-14 RX ADMIN — Medication 10 ML: at 08:55

## 2024-05-14 RX ADMIN — METOPROLOL SUCCINATE 25 MG: 25 TABLET, EXTENDED RELEASE ORAL at 08:53

## 2024-05-14 RX ADMIN — ACETAZOLAMIDE 250 MG: 500 INJECTION, POWDER, LYOPHILIZED, FOR SOLUTION INTRAVENOUS at 08:53

## 2024-05-14 RX ADMIN — FUROSEMIDE 40 MG: 40 TABLET ORAL at 08:53

## 2024-05-14 RX ADMIN — IPRATROPIUM BROMIDE AND ALBUTEROL SULFATE 3 ML: .5; 3 SOLUTION RESPIRATORY (INHALATION) at 06:25

## 2024-05-14 RX ADMIN — BUDESONIDE 0.5 MG: 0.5 INHALANT RESPIRATORY (INHALATION) at 06:30

## 2024-05-14 RX ADMIN — FOLIC ACID 1 MG: 1 TABLET ORAL at 08:53

## 2024-05-14 RX ADMIN — POTASSIUM CHLORIDE 20 MEQ: 1500 TABLET, EXTENDED RELEASE ORAL at 08:53

## 2024-05-14 NOTE — PROGRESS NOTES
Cardiology [place]        LOS:  LOS: 7 days   Patient Name: Jeanne King  Age/Sex: 64 y.o. female  : 1960  MRN: [id number]    Day of Service: 24   Length of Stay: 7  Encounter Provider: FRANCIS Wills  Place of Service: [facility] CARDIOLOGY  Patient Care Team:  Provider, No Known as PCP - General    Subjective:     Chief Complaint: f/u shortness of breath / edema    Subjective: patient remains on supplemental O2, 6 min walk planned  Planning for discharge home today  She is on oral diuresis  No acute CV events    Current Medications:   Scheduled Meds:[START ON 5/15/2024] acetaZOLAMIDE, 250 mg, Oral, Daily  budesonide, 0.5 mg, Nebulization, BID - RT  FLUoxetine, 10 mg, Oral, Daily  FLUoxetine, 20 mg, Oral, Daily  folic acid, 1 mg, Oral, Daily  furosemide, 40 mg, Oral, Daily  insulin lispro, 2-7 Units, Subcutaneous, 4x Daily AC & at Bedtime  ipratropium-albuterol, 3 mL, Nebulization, 4x Daily - RT  methylPREDNISolone sodium succinate, 40 mg, Intravenous, Q12H  metoprolol succinate XL, 25 mg, Oral, Q12H  miconazole, 1 Application, Topical, Q12H  nicotine, 1 patch, Transdermal, Q24H  potassium chloride, 20 mEq, Oral, BID With Meals  sodium chloride, 10 mL, Intravenous, Q12H      Continuous Infusions:     Allergies:  Allergies   Allergen Reactions    Keflex [Cephalexin] Mental Status Change and Provider Review Needed    Morphine Mental Status Change    Premarin [Conjugated Estrogens] Mental Status Change       Review of Systems   Constitutional: Negative for chills, diaphoresis and malaise/fatigue.   Cardiovascular:  Positive for dyspnea on exertion (chronic). Negative for chest pain, irregular heartbeat, leg swelling, near-syncope, orthopnea, palpitations, paroxysmal nocturnal dyspnea and syncope.   Respiratory:  Negative for cough, shortness of breath, sleep disturbances due to breathing and sputum production.    Gastrointestinal:  Negative for change in bowel habit.    Genitourinary:  Negative for urgency.   Neurological:  Negative for dizziness and headaches.   Psychiatric/Behavioral:  Negative for altered mental status.          Objective:     Temp:  [98.1 °F (36.7 °C)-98.6 °F (37 °C)] 98.4 °F (36.9 °C)  Heart Rate:  [55-75] 58  Resp:  [12-22] 12  BP: (125-148)/(62-77) 134/70     Intake/Output Summary (Last 24 hours) at 5/14/2024 1131  Last data filed at 5/13/2024 2000  Gross per 24 hour   Intake 360 ml   Output --   Net 360 ml     Body mass index is 32.78 kg/m².      05/10/24  0743 05/11/24  0500 05/14/24  0621   Weight: 96.2 kg (212 lb) 100 kg (221 lb 5.5 oz) 97.8 kg (215 lb 9.8 oz)         General Appearance:    Alert, cooperative, in no acute distress                                Head: Atraumatic, normocephalic, PERRLA               Neck:   supple, no JVD   Lungs:     Supplemental O2, dim bilat bases    Heart:    Regular rhythm and normal rate, normal S1 and S2   Abdomen:     Normal bowel sounds, no masses, no organomegaly, soft  nontender, nondistended, no guarding, no rebound  tenderness   Extremities:   Moves all extremities well, edema, no cyanosis, no  redness   Pulses:   Pulses palpable and equal bilaterally   Skin:   No bleeding, bruising or rash   Neurologic:   Awake, alert, oriented x3, intermittent confusion         Lab Review:   Results from last 7 days   Lab Units 05/13/24  1349 05/12/24  0415 05/11/24  0300 05/10/24  0551   SODIUM mmol/L 138 136   < > 137   POTASSIUM mmol/L 4.0 4.5   < > 5.0   CHLORIDE mmol/L 93* 90*   < > 94*   CO2 mmol/L 38.0* 40.0*   < > 36.0*   BUN mg/dL 22 28*   < > 27*   CREATININE mg/dL 0.61 0.64   < > 0.71   GLUCOSE mg/dL 128* 116*   < > 123*   CALCIUM mg/dL 9.6 9.5   < > 9.2   AST (SGOT) U/L 15  --   --  36*   ALT (SGPT) U/L 51*  --   --  45*    < > = values in this interval not displayed.     Results from last 7 days   Lab Units 05/08/24  1016 05/08/24  0618   HSTROP T ng/L 22* 19*     Results from last 7 days   Lab Units  "05/12/24  0415 05/11/24  0414   WBC 10*3/mm3 10.66 8.42   HEMOGLOBIN g/dL 17.8* 17.5*   HEMATOCRIT % 58.7* 58.4*   PLATELETS 10*3/mm3 102* 105*         Results from last 7 days   Lab Units 05/10/24  0551 05/09/24  0308   MAGNESIUM mg/dL 1.9 1.9           Invalid input(s): \"LDLCALC\"                  Recent Radiology:  Imaging Results (Most Recent)       Procedure Component Value Units Date/Time    XR Chest 1 View [[id number]] Collected: 05/11/24 1119     Updated: 05/11/24 1124    Narrative:      XR CHEST 1 VW    Date of Exam: 5/11/2024 6:03 AM EDT    Indication: H/O CHF    Comparison: 5/10/2024    Findings:  Cardiomegaly is stable. There is persistent pulmonary vascular congestion. Pulmonary interstitial edema appears improved. There is improving bibasilar airspace disease. No pleural effusion. No pneumothorax. Bony structures are unremarkable.      Impression:      Impression:    1. Stable cardiomegaly.  2. Slight improvement in bibasilar airspace disease.      Electronically Signed: Noble Hernandez MD    5/11/2024 11:22 AM EDT    Workstation ID: [id number]    XR Chest 1 View [[id number]] Collected: 05/10/24 0920     Updated: 05/10/24 0924    Narrative:      XR CHEST 1 VW    Date of Exam: 5/10/2024 9:11 AM EDT    Indication: post op    Comparison: None available.    Findings:  Cardiac silhouette is enlarged. Pulmonary vasculature is indistinct. Scattered bibasilar atelectasis is present. No significant pleural effusion or pneumothorax. No acute osseous lesion is seen.      Impression:      Impression:  1.Cardiomegaly with pulmonary vascular congestion and scattered bibasilar atelectasis.      Electronically Signed: Frankie Matson MD    5/10/2024 9:22 AM EDT    Workstation ID: [id number]    CT Outside Films [[id number]] Resulted: 05/08/24 0935     Updated: 05/08/24 0935    Narrative:      This procedure was auto-finalized with no dictation required.    XR Outside Films [[id number]] Resulted: 05/08/24 0935     Updated: " "05/08/24 0935    Narrative:      This procedure was auto-finalized with no dictation required.    US Liver [[id number]] Collected: 05/07/24 0731     Updated: 05/07/24 0735    Narrative:      DATE OF EXAM:  5/7/2024 4:35 AM     PROCEDURE:  US LIVER-     INDICATIONS:  ascites, eval for cirrhosis     COMPARISON:  No Comparisons Available     TECHNIQUE:   Grayscale and color Doppler ultrasound evaluation of the limited abdomen  was performed.     FINDINGS:  The liver is enlarged, 20.0 cm in length. The liver echotexture is  coarsened. There is a subtle nodular surface contour of the liver  suggestive of cirrhosis. No focal liver lesion is identified. There is a  \"starry eric\" pattern of the liver parenchyma, which may also reflect  changes of chronic liver disease. No ascites is evident. Main portal  vein is patent with hepatopetal flow. The imaged hepatic veins are  patent. Common bile duct caliber is within normal limits, 6 mm. No  intrahepatic biliary ductal dilation is observed. The intrahepatic IVC  demonstrates color flow.       Impression:      1. Hepatomegaly.  2. Subtle nodular surface contour of the liver with mildly coarsened  echotexture, raising the possibility of cirrhosis.  3. No focal liver lesion is seen.  4. No ascites is seen.     Electronically Signed By-Jane Woodruff MD On:5/7/2024 7:33 AM               ECHOCARDIOGRAM:    Results for orders placed during the hospital encounter of 05/07/24    Adult Transthoracic Echo Complete W/ Cont if Necessary Per Protocol    Interpretation Summary    Left ventricular systolic function is normal. Left ventricular ejection fraction appears to be 56 - 60%.    Left ventricular diastolic function is consistent with (grade I) impaired relaxation.    Mildly reduced right ventricular systolic function noted.    The right ventricular cavity is moderately dilated.    Estimated right ventricular systolic pressure from tricuspid regurgitation is normal (<35 mmHg).    IVC is " dilated        I reviewed the patient's new clinical results.    EKG:      Assessment:       Acute hypoxic respiratory failure    Lung mass    Acute hypoxic and hypercapnic respiratory insufficiency  Volume overload / HFpEF / RV dysfunction  HTN  Tobacco abuse  Abnormal CT at OSH with Concern for primary lung neoplasm with 2 lung lesions identified with mediastinal and hilar lymphadenopathy and cirrhotic changes of liver  Polycythemia  Intermittent confusion       Plan:   Patient admitted with shortness of breath, lower extremity edema, long term smoker, has not seen physician in a number of years, CT at [facility] with concern for lung neoplasm and hilar lymphadenopathy  Volume status improving  On oral diuresis  Stress test as outpt once improves from lung standpoint / on less O2  Needs SHANKAR work up as outpt  6 min. Walk  Planned for discharge today    Recommend outpt cardiology f/u in 4 weeks    Patient is seen and examined and findings are verified.  All data is reviewed by me personally.  Assessment and plan formulated by APC was done after discussion with attending.  I spent more than 50% of time in taking care of the patient.    Patient is lying comfortably in bed.  Patient wants to go home    Normal S1 and S2.  No pericardial rub or murmur abdominal exam is benign    Patient is feeling much better.  Pulmonary status has improved.  She has still oxygen.  However oxygen requirement is much less.  Patient leg edema has improved.  Continue baseline diuretics.  Patient can be discharged and follow-up with me in 1 to 2 months.    Electronically signed by Josh Mendez MD, 05/14/24, 2:33 PM EDT.            FRANCIS Wills  05/14/24  11:31 EDT

## 2024-05-14 NOTE — PROGRESS NOTES
Daily Progress Note          Assessment    Acute hypoxic/hypercapnic respiratory failure   AE COPD  Hx COVID-19   RLL spiculated mass lesion    HTN  Tobacco abuse  Obesity  Anxiety     PLAN:   Oxygen supplement and titration to maintain saturation 90-95%: Currently on 3-5 L per nasal cannula, BiPAP at bedtime and as needed     Patient can be discharged home from pulmonary standpoint once her oxygen concentrator is set up at home, follow-up in the pulmonary clinic in 2 weeks      S/p EBUS 5/10/2024 with preliminary results neg for cancer will await final culture  Patient will need PET scan as an outpatient and if positive to schedule robotic/ion bronchoscopy  Encourage OOB during day   Bronchodilators  Inhaled corticosteroids  IV steroids: Switch to tapering dose prednisone  Incentive spirometer  Smoking cessation counseling, nicotine patch  Diuresis  Electrolytes/ glycemic control    I personally reviewed the radiological images               LOS: 7 days     Subjective     Patient reports improvement in the cough and shortness of breath    Objective     Vital signs for last 24 hours:  Vitals:    05/14/24 0629 05/14/24 0630 05/14/24 0634 05/14/24 0903   BP:    134/70   BP Location:    Right arm   Patient Position:    Lying   Pulse: 57 56 57 58   Resp: 20 20 20 12   Temp:    98.4 °F (36.9 °C)   TempSrc:    Oral   SpO2: 97% 99% 99% 91%   Weight:       Height:           Intake/Output last 3 shifts:  I/O last 3 completed shifts:  In: 600 [P.O.:600]  Out: 300 [Urine:300]  Intake/Output this shift:  No intake/output data recorded.      Radiology  Imaging Results (Last 24 Hours)       ** No results found for the last 24 hours. **            Labs:  Results from last 7 days   Lab Units 05/12/24  0415   WBC 10*3/mm3 10.66   HEMOGLOBIN g/dL 17.8*   HEMATOCRIT % 58.7*   PLATELETS 10*3/mm3 102*     Results from last 7 days   Lab Units 05/13/24  1349   SODIUM mmol/L 138   POTASSIUM mmol/L 4.0   CHLORIDE mmol/L 93*   CO2 mmol/L  38.0*   BUN mg/dL 22   CREATININE mg/dL 0.61   CALCIUM mg/dL 9.6   BILIRUBIN mg/dL 2.7*   ALK PHOS U/L 91   ALT (SGPT) U/L 51*   AST (SGOT) U/L 15   GLUCOSE mg/dL 128*     Results from last 7 days   Lab Units 05/12/24  0857   PH, ARTERIAL pH units 7.384   PO2 ART mm Hg 79.7*   PCO2, ARTERIAL mm Hg 71.7*   HCO3 ART mmol/L 42.8*     Results from last 7 days   Lab Units 05/13/24  1349 05/10/24  0551   ALBUMIN g/dL 3.7 3.4*     Results from last 7 days   Lab Units 05/08/24  1016 05/08/24  0618   HSTROP T ng/L 22* 19*         Results from last 7 days   Lab Units 05/10/24  0551   MAGNESIUM mg/dL 1.9                     Meds:   SCHEDULE  acetaZOLAMIDE (DIAMOX) 250 mg in sterile water (preservative free) 2.5 mL injection, 250 mg, Intravenous, Daily  budesonide, 0.5 mg, Nebulization, BID - RT  FLUoxetine, 10 mg, Oral, Daily  FLUoxetine, 20 mg, Oral, Daily  folic acid, 1 mg, Oral, Daily  furosemide, 40 mg, Oral, Daily  insulin lispro, 2-7 Units, Subcutaneous, 4x Daily AC & at Bedtime  ipratropium-albuterol, 3 mL, Nebulization, 4x Daily - RT  methylPREDNISolone sodium succinate, 40 mg, Intravenous, Q12H  metoprolol succinate XL, 25 mg, Oral, Q12H  miconazole, 1 Application, Topical, Q12H  nicotine, 1 patch, Transdermal, Q24H  potassium chloride, 20 mEq, Oral, BID With Meals  sodium chloride, 10 mL, Intravenous, Q12H      Infusions     PRNs    acetaminophen    ALPRAZolam    senna-docusate sodium **AND** polyethylene glycol **AND** bisacodyl **AND** bisacodyl    dextrose    dextrose    glucagon (human recombinant)    ipratropium-albuterol    nitroglycerin    ondansetron    sodium chloride    sodium chloride    Physical Exam:  General Appearance:  Alert   HEENT:  Normocephalic, without obvious abnormality, Conjunctiva/corneas clear,.   Nares normal, no drainage     Neck:  Supple, symmetrical, trachea midline.   Lungs /Chest wall: Good air entry with mild bilateral basal rhonchi, respirations unlabored, symmetrical wall movement.      Heart:  Regular rate and rhythm, S1 S2 normal  Abdomen: Soft, non-tender, no masses, no organomegaly.    Extremities: No edema, no clubbing or cyanosis     ROS  Constitutional: Negative for chills, fever and malaise/fatigue.   HENT: Negative.    Eyes: Negative.    Cardiovascular: Negative.    Respiratory: Positive for gradual improvement in the cough and shortness of breath.    Skin: Negative.    Musculoskeletal: Negative.    Gastrointestinal: Negative.    Genitourinary: Negative.    Neurological: Negative.    Psychiatric/Behavioral: Negative.      I reviewed the recent clinical results  I personally reviewed the latest radiological studies    Part of this note may be an electronic transcription/translation of spoken language to printed text using the Dragon Dictation System.

## 2024-05-14 NOTE — PLAN OF CARE
Goal Outcome Evaluation:      Pt had bipap on throughout the night and was tolerated well, pt voiced no complaints, will continue to monitor

## 2024-05-14 NOTE — DISCHARGE PLACEMENT REQUEST
"Neville King (64 y.o. Female)       Date of Birth   1960    Social Security Number       Address   [address] [place] [address] [address] [facility] [place] [address]    Home Phone   [phone]    MRN   [id number]       Sikh   None    Marital Status                               Admission Date   5/7/24    Admission Type   Urgent    Admitting Provider   Tiarra Renee MD    Attending Provider   Kirk Flores MD    Department, Room/Bed   [facility], 2101/1       Discharge Date       Discharge Disposition   Home or Self Care    Discharge Destination                                 Attending Provider: Kirk Flores MD    Allergies: Keflex [Cephalexin], Morphine, Premarin [Conjugated Estrogens]    Isolation: None   Infection: None   Code Status: CPR    Ht: 172.7 cm (68\")   Wt: 97.8 kg (215 lb 9.8 oz)    Admission Cmt: None   Principal Problem: Acute hypoxic respiratory failure [J96.01]                   Active Insurance as of 5/7/2024       Primary Coverage       Payor Plan Insurance Group Employer/Plan Group    ANTHEM MEDICARE REPLACEMENT ANTHEM MEDICARE ADVANTAGE [id number]       Payor Plan Address Payor Plan Phone Number Payor Plan Fax Number Effective Dates    [address] [phone]  1/1/2024 - None Entered    [facility] [address]         Subscriber Name Subscriber Birth Date Member ID       NEVILLE KING 1960 [id number]               Secondary Coverage       Payor Plan Insurance Group Employer/Plan Group    [id number]        Payor Plan Address Payor Plan Phone Number Payor Plan Fax Number Effective Dates    [address]   1/1/2024 - None Entered    [place] [address]         Subscriber Name Subscriber Birth Date Member ID       NEVILLE KING 1960 [id number]                     Emergency Contacts        (Rel.) Home Phone Work Phone Mobile Phone    SUKH KING (Daughter) -- -- [phone]        Home " Nebulizer [] (Order [id number])  Order  Date: 5/14/2024 Department: [facility] Ordering/Authorizing: Kirk Flores MD     Order History  Outpatient  Date/Time Action Taken User Additional Information   05/14/24 1643 Sign Yolanda Medina RN Ordering Mode: Verbal with readback   05/14/24 1705 Verbal Cosign Kirk Flores MD      Order Details    Frequency Duration Priority Order Class   None None Routine External     Start Date/Time    Start Date   05/14/24     Order Information    Order Date Service Start Date Start Time   05/14/24 Medicine 05/14/24      Reference Links    Associated Reports   View Encounter     Order Questions    Question Answer   Nebulizer Equipment:  Nebulizer w/ Compressor   Nebulizer Accessories  Nebulizer Kit - Administration Set, Non-Disposable   Length of Need 99 Months = Lifetime            Verbal / Cosign Order Info    Action Created on Order Mode Entered by Responsible Provider Signed by Signed on   Ordering 05/14/24 164 Verbal with readback Yolanda Medina RN Salcedo, Federico N, MD Salcedo, Federico N, MD 05/14/24 1705     Source Order Set / Preference List    Preference List   AMB SUPPLIES [[id number]]             Clinical Indications     ICD-10-CM ICD-9-CM   Lung mass  - Primary    R91.8 786.6   Acute hypoxic respiratory failure    J96.01 518.81   Chronic obstructive pulmonary disease with acute exacerbation    J44.1 491.21                             Reprint Order Requisition    Home Nebulizer (Order #[id number]) on 5/14/24         Encounter    View Encounter                Order Provider Info        Office phone Pager E-mail   Ordering User Yolanda Medina RN -- -- --   Authorizing Provider  Kirk Flores MD  [phone] -- --   Attending Provider  Kirk Flores MD  [phone] -- --   Entered By Yolanda Medina RN -- -- --   Ordering Provider  Kirk Flores MD  [phone] -- --   Signed By (on  05/14/2024 1705)  Kirk Flores MD  [phone] -- --     Tracking Reports    Cosign Tracking Order Transmittal Tracking     Authorized by:  Kirk Flores MD  (NPI: [id number])                Lab Component SmartPhrase Guide    Home Nebulizer (Order #[id number]) on 5/14/24

## 2024-05-14 NOTE — DISCHARGE PLACEMENT REQUEST
"Neville King (64 y.o. Female)       Date of Birth   1960    Social Security Number       Address   [address] [place] [address] [place] [address]    Home Phone   [phone]    MRN   [id number]       Scientology   None    Marital Status                               Admission Date   5/7/24    Admission Type   Urgent    Admitting Provider   Tiarra Renee MD    Attending Provider   Kirk Flores MD    Department, Room/Bed   [facility], 2101/1       Discharge Date       Discharge Disposition       Discharge Destination                                 Attending Provider: Kirk Flores MD    Allergies: Keflex [Cephalexin], Morphine, Premarin [Conjugated Estrogens]    Isolation: None   Infection: None   Code Status: CPR    Ht: 172.7 cm (68\")   Wt: 97.8 kg (215 lb 9.8 oz)    Admission Cmt: None   Principal Problem: Acute hypoxic respiratory failure [J96.01]                   Active Insurance as of 5/7/2024       Primary Coverage       Payor Plan Insurance Group Employer/Plan Group    ANTHEM MEDICARE REPLACEMENT ANTHEM MEDICARE ADVANTAGE [id number]       Payor Plan Address Payor Plan Phone Number Payor Plan Fax Number Effective Dates    [address] [phone]  1/1/2024 - None Entered    [facility] [phone]         Subscriber Name Subscriber Birth Date Member ID       NEVILLE KING 1960 [id number]               Secondary Coverage       Payor Plan Insurance Group Employer/Plan Group    [id number]        Payor Plan Address Payor Plan Phone Number Payor Plan Fax Number Effective Dates    [address]   1/1/2024 - None Entered    [place] [address]         Subscriber Name Subscriber Birth Date Member ID       NEVILLE KING 1960 [id number]                     Emergency Contacts        (Rel.) Home Phone Work Phone Mobile Phone    SUKH KING (Daughter) -- -- [phone]                 History & " Physical        Mariah Cheney APRN at 24 0248       Attestation signed by Tiarra Renee MD at 24 1253    I have reviewed this documentation and agree.                      [facility] Medicine Services    Hospitalist History and Physical     Jeanne Fernando : 1960 MRN:[id number] LOS:0 ROOM: [facility]     Reason for admission: Acute hypoxic respiratory failure     Assessment / Plan     Acute hypoxic respiratory failure  Possibly mild encephalopathy - resolved   Due to acute pulmonary edema with possible underlying COPD  Pt reports is a COVID long hauler  Also rule out cirrhosis with ascites  BLE edema   - responded well to lasix   - Reportedly sats in the 70s on admission. Placed initially on 6L then escalted to 30L high flow. Briefly on Bi-pap. Now sats 91% on 5L NC   - CT chest: 1. No pulmonary emboli 2. Bilateral rond glass patchy opacities nonspecific suggestion of pulmonary edema.  A right lower lobe 2.3 cm spiculated mass lesion, another more distal lesion 1.7 cm.  These lesions are highly concerning for primary lung neoplasm and recommend biopsy or PET/CT exam.  Lingular atelectasis.  No pneumothorax or pleural effusion.  Mediastinal and hilar lymphadenopathy.  #3 cardiomegaly and coronary artery calcifications.  #4 cirrhotic enlarged liver.  Splenomegaly.  Abdominal ascites.  - afebrile. Negative for cough or fever. Negative leukocytosis. Does not appear to have pneumonia  - VBG: pH 7.53, PCO2 35, Po2 101, bicarb 29 O2 98%   - will order 2D echo to evaluate cardiomegaly  - will order US liver to evaluate cirrhosis  - I&O  - daily weight  -duo neb prn   - Pulmonary consult  - consider cardiology, GI, or oncology consult based on results     HTN  - resume home Norvasc    Anxiety  - resume home Xanax and Prozac    COPD  - resume home inhalers Albuterol and Advair    Tobacco dependency  -recommend complete cessation  -nicotine patch as needed    Obesity  - BMI 32.82  - recommend diet  "and lifestyle changes       Level Of Support Discussed With: Patient  Code Status (Patient has no pulse and is not breathing): CPR (Attempt to Resuscitate)  Medical Interventions (Patient has pulse or is breathing): Full Support       Nutrition:   Diet: Cardiac; Healthy Heart (2-3 Na+); Fluid Consistency: Thin (IDDSI 0)     DVT prophylaxis:  Mechanical DVT prophylaxis orders are present.         History of Present illness     Jeanne King is a 64 y.o. female with PMH of hypertension, COPD, anxiety presented initially to [facility].  Patient reports she has been feeling unwell for \"a long time\".  She does not use oxygen at home.  Uses albuterol and Advair.  Does not like going to the doctor and very anxious and was not willing to seek medical help until her daughter persuaded her due to acute edema to bilateral lower extremities with signs of weeping.  Patient denies any cough fever chills.  Positive for shortness of breath.  Reports family history of sarcoidosis and Rodriguez cirrhosis    Patient reports feels that she \"was not thinking straight\" when she arrived in the ED.  She remembers getting told that her oxygen was in the 70s and she recalls thinking \"that is not bad\".  Once O2 improved to normal range she realized that she had been slightly delirious.  It is reported that she was initially on 6 L nasal cannula but then escalated to 30 L high flow with FiO2 of 60.  She was given 40 of Lasix with approximately 1 L of output.  I requested patient be placed on BiPAP which she did tolerate shortly .VBG showed pH 7.53, pCO2 35, pO2 101, bicarb 29 and O2 sat 98.  COVID was negative negative for leukocytosis.  Hemoglobin 19.4 and platelet 125.  Other labs reviewed potassium 3.4.  High-sensitivity troponin was 34 lactate 1.3 .  CT of the chest was negative for pulmonary emboli.  Concern for primary lung neoplasm with 2 lung lesions identified with mediastinal and hilar lymphadenopathy.  Cardiomegaly and coronary " artery calcifications.  Cirrhotic enlarged liver splenomegaly and abdominal ascites.  She was transferred to [facility].  On arrival patient sats 91% on 5 L nasal cannula.  She is negative for any respiratory distress.  She has been admitted for further testing    Patient was seen and examined on 05/07/24 at 04:13 EDT .    Subjective / Review of systems     Review of Systems   Constitutional:  Positive for activity change and fatigue.   Respiratory:  Positive for shortness of breath.    Cardiovascular:  Positive for leg swelling.          Past Medical/Surgical/Social/Family History & Allergies     Past Medical History:   Diagnosis Date    COPD (chronic obstructive pulmonary disease)     Hypertension       Past Surgical History:   Procedure Laterality Date    HYSTERECTOMY        Social History     Socioeconomic History    Marital status:    Tobacco Use    Smoking status: Every Day     Current packs/day: 1.50     Average packs/day: 1.5 packs/day for 53.3 years (80.0 ttl pk-yrs)     Types: Cigarettes     Start date: 1971    Smokeless tobacco: Never   Vaping Use    Vaping status: Never Used   Substance and Sexual Activity    Alcohol use: Never    Drug use: Never      Family History   Problem Relation Age of Onset    No Known Problems Mother     No Known Problems Father     No Known Problems Sister     No Known Problems Brother       Allergies   Allergen Reactions    Keflex [Cephalexin] Mental Status Change and Provider Review Needed    Morphine Mental Status Change    Premarin [Conjugated Estrogens] Mental Status Change      Social Determinants of Health     Tobacco Use: High Risk (5/7/2024)    Patient History     Smoking Tobacco Use: Every Day     Smokeless Tobacco Use: Never     Passive Exposure: Not on file   Alcohol Use: Not At Risk (5/7/2024)    AUDIT-C     Frequency of Alcohol Consumption: Never     Average Number of Drinks: Patient does not drink     Frequency of Binge Drinking: Never    Financial Resource Strain: Not on file   Food Insecurity: Not on file   Transportation Needs: Not on file   Physical Activity: Not on file   Stress: Not on file   Social Connections: Unknown (5/6/2024)    Family and Community Support     Help with Day-to-Day Activities: Not on file     Lonely or Isolated: Not on file   Interpersonal Safety: Not At Risk (5/7/2024)    Abuse Screen     Unsafe at Home or Work/School: no     Feels Threatened by Someone?: no     Does Anyone Keep You from Contacting Others or Doint Things Outside the Home?: no     Physical Sign of Abuse Present: no   Depression: Not on file   Housing Stability: Unknown (5/7/2024)    Housing Stability     Current Living Arrangements: home     Potentially Unsafe Housing Conditions: Not on file   Utilities: Not on file   Health Literacy: Unknown (5/6/2024)    Education     Help with school or training?: Not on file     Preferred Language: Not on file   Employment: Unknown (5/6/2024)    Employment     Do you want help finding or keeping work or a job?: Not on file   Disabilities: Not At Risk (5/7/2024)    Disabilities     Concentrating, Remembering, or Making Decisions Difficulty: no     Doing Errands Independently Difficulty: no        Home Medications     Prior to Admission medications    Medication Sig Start Date End Date Taking? Authorizing Provider   ALPRAZolam (XANAX) 1 MG tablet Take 1 tablet by mouth 3 (Three) Times a Day As Needed for Anxiety.   Yes Memo Powers MD   amLODIPine (NORVASC) 5 MG tablet Take 1 tablet by mouth Daily.   Yes Memo Powers MD   FLUoxetine (PROzac) 10 MG capsule Take 1 capsule by mouth Daily.   Yes Memo Powers MD   FLUoxetine (PROzac) 20 MG capsule Take 1 capsule by mouth Daily.   Yes Memo Powers MD        Objective / Physical Exam     Vital signs:  Temp: 97.9 °F (36.6 °C)  BP: 119/72  Heart Rate: 80  Resp: 15  SpO2: 93 %  Weight: 97.9 kg (215 lb 13.3 oz)    Admission Weight: Weight: 97.9  kg (215 lb 13.3 oz)    Physical Exam  Constitutional:       Appearance: She is obese.   Eyes:      Pupils: Pupils are equal, round, and reactive to light.   Cardiovascular:      Rate and Rhythm: Normal rate and regular rhythm.   Pulmonary:      Breath sounds: Rhonchi present.   Abdominal:      General: There is no distension.      Palpations: Abdomen is soft.   Musculoskeletal:      Right lower leg: Edema present.      Left lower leg: Edema present.   Skin:     General: Skin is warm and dry.   Neurological:      Mental Status: She is alert and oriented to person, place, and time.   Psychiatric:         Mood and Affect: Mood normal.         Behavior: Behavior normal.            Labs     2024 white blood cell 9.2, hemoglobin 19.4, hematocrit 59.4, platelet 124, sodium 139, potassium 3.4, chloride 98, carbon dioxide 34, anion gap of 7, BUN 15, creatinine 0.65, glucose 113, ALT 15, AST 11, bilirubin 2, albumin 3.2, high-sensitivity troponin 34, lactate 1.3    Imaging     See above assessment and plan.  Copy of CT PE protocol in chart    Current Medications     Scheduled Meds:  amLODIPine, 5 mg, Oral, Daily  budesonide-formoterol, 2 puff, Inhalation, BID - RT  famotidine, 40 mg, Oral, Daily  FLUoxetine, 10 mg, Oral, Daily  FLUoxetine, 20 mg, Oral, Daily  furosemide, 40 mg, Intravenous, Q12H  nicotine, 1 patch, Transdermal, Q24H  potassium chloride, 20 mEq, Oral, BID  sodium chloride, 10 mL, Intravenous, Q12H         Continuous Infusions:          Mariah Cheney [facility] Medicine  24   04:13 EDT      Electronically signed by Tiarra Renee MD at 24 0548          Physician Progress Notes (last 24 hours)        Kirk Flores MD at 24 0889              [facility] MEDICINE SERVICE  DAILY PROGRESS NOTE    NAME: Jeanne King  : 1960  MRN: [id number]      LOS: 7 days     PROVIDER OF SERVICE: Kirk Flores MD    Chief Complaint: Acute hypoxic respiratory  "failure    Subjective:        History of Present illness      Jeanne King is a 64 y.o. female with PMH of hypertension, COPD, anxiety presented initially [facility].  Patient reports she has been feeling unwell for \"a long time\".  She does not use oxygen at home.  Uses albuterol and Advair.  Does not like going to the doctor and very anxious and was not willing to seek medical help until her daughter persuaded her due to acute edema to bilateral lower extremities with signs of weeping.  Patient denies any cough fever chills.  Positive for shortness of breath.  Reports family history of sarcoidosis and Rodriguez cirrhosis     Patient reports feels that she \"was not thinking straight\" when she arrived in the ED.  She remembers getting told that her oxygen was in the 70s and she recalls thinking \"that is not bad\".  Once O2 improved to normal range she realized that she had been slightly delirious.  It is reported that she was initially on 6 L nasal cannula but then escalated to 30 L high flow with FiO2 of 60.  She was given 40 of Lasix with approximately 1 L of output.  I requested patient be placed on BiPAP which she did tolerate shortly .VBG showed pH 7.53, pCO2 35, pO2 101, bicarb 29 and O2 sat 98.  COVID was negative negative for leukocytosis.  Hemoglobin 19.4 and platelet 125.  Other labs reviewed potassium 3.4.  High-sensitivity troponin was 34 lactate 1.3 .  CT of the chest was negative for pulmonary emboli.  Concern for primary lung neoplasm with 2 lung lesions identified with mediastinal and hilar lymphadenopathy.  Cardiomegaly and coronary artery calcifications.  Cirrhotic enlarged liver splenomegaly and abdominal ascites.  She was transferred to [facility].  On arrival patient sats 91% on 5 L nasal cannula.  She is negative for any respiratory distress.  She has been admitted for further testing     Patient was seen and examined on 05/07/24 at 04:13 EDT .    Interval History:  History taken " from: patient  Patient Complaints: Headaches, ABG noted  patient to be on BiPAP overnight   denies: Nausea or vomiting    5/13 patient seen and examined, denies any chest pain shortness of breath, she is requiring 5 L of oxygen and does not use any oxygen at home at all.  5/14/24 patient seen and examined examined in bed no acute distress, doing better today, worked with physical therapy, discussed with RN.  Will order 6-minute walk, patient is to go home on discharge, she lives alone.  Will likely need oxygen.  On p.o. Lasix.     Review of Systems  14 point review of system unremarkable except mentioned above  Objective:     Vital Signs  Temp:  [98.1 °F (36.7 °C)-98.6 °F (37 °C)] 98.1 °F (36.7 °C)  Heart Rate:  [54-75] 57  Resp:  [14-22] 20  BP: (125-148)/(62-77) 148/77  Flow (L/min):  [4-5] 5   Body mass index is 32.78 kg/m².    Physical Exam  HENT:      Head: Atraumatic.      Mouth/Throat:      Mouth: Mucous membranes are moist.   Eyes:      Pupils: Pupils are equal, round, and reactive to light.   Cardiovascular:      Rate and Rhythm: Normal rate and regular rhythm.      Pulses: Normal pulses.      Heart sounds: Normal heart sounds.   Abdominal:      General: Bowel sounds are normal.      Palpations: Abdomen is soft.   Musculoskeletal:      Right lower leg: Edema present.      Left lower leg: Edema present.   Skin:     General: Skin is warm.   Neurological:      General: No focal deficit present.      Mental Status: She is alert and oriented to person, place, and time.   Psychiatric:         Mood and Affect: Mood normal.         Scheduled Meds   acetaZOLAMIDE (DIAMOX) 250 mg in sterile water (preservative free) 2.5 mL injection, 250 mg, Intravenous, Daily  budesonide, 0.5 mg, Nebulization, BID - RT  FLUoxetine, 10 mg, Oral, Daily  FLUoxetine, 20 mg, Oral, Daily  folic acid, 1 mg, Oral, Daily  furosemide, 40 mg, Oral, Daily  insulin lispro, 2-7 Units, Subcutaneous, 4x Daily AC & at Bedtime  ipratropium-albuterol,  3 mL, Nebulization, 4x Daily - RT  methylPREDNISolone sodium succinate, 40 mg, Intravenous, Q12H  metoprolol succinate XL, 25 mg, Oral, Q12H  miconazole, 1 Application, Topical, Q12H  nicotine, 1 patch, Transdermal, Q24H  potassium chloride, 20 mEq, Oral, BID With Meals  sodium chloride, 10 mL, Intravenous, Q12H       PRN Meds     acetaminophen    ALPRAZolam    senna-docusate sodium **AND** polyethylene glycol **AND** bisacodyl **AND** bisacodyl    dextrose    dextrose    glucagon (human recombinant)    ipratropium-albuterol    nitroglycerin    ondansetron    sodium chloride    sodium chloride   Infusions         Diagnostic Data    Results from last 7 days   Lab Units 05/13/24  1349 05/12/24  0415   WBC 10*3/mm3  --  10.66   HEMOGLOBIN g/dL  --  17.8*   HEMATOCRIT %  --  58.7*   PLATELETS 10*3/mm3  --  102*   GLUCOSE mg/dL 128* 116*   CREATININE mg/dL 0.61 0.64   BUN mg/dL 22 28*   SODIUM mmol/L 138 136   POTASSIUM mmol/L 4.0 4.5   AST (SGOT) U/L 15  --    ALT (SGPT) U/L 51*  --    ALK PHOS U/L 91  --    BILIRUBIN mg/dL 2.7*  --    ANION GAP mmol/L 7.0 6.0       No radiology results for the last day      I reviewed the patient's new clinical results.    Assessment/Plan:     #Acute hypoxemic respiratory failure  #Acute exacerbation of likely diastolic heart failure  #Acute pulmonary edema  # suspicious Malignant lung neoplasm and mediastinal   Lymphadenopathy  #Bilateral lower extremity edema likely from above  -Continue to supplement oxygen to keep saturation above 90 to 92%  - CT of the chest 1. No pulmonary emboli 2. Bilateral rond glass patchy opacities nonspecific suggestion of pulmonary edema.  A right lower lobe 2.3 cm spiculated mass lesion, another more distal lesion 1.7 cm.  These lesions are highly concerning for primary lung neoplasm and recommend biopsy or PET/CT exam.  Lingular atelectasis.  No pneumothorax or pleural effusion.  Mediastinal and hilar lymphadenopathy.  #3 cardiomegaly and coronary artery  calcifications.  #4 cirrhotic enlarged liver.  Splenomegaly.  Abdominal ascites.  - afebrile. Negative for cough or fever. Negative leukocytosis. Does not appear to have pneumonia  -Continue IV Lasix --will be transitioned to p.o. per cardiology recommendations.  - Transthoracic echocardiogram  noted ; out pt ischemic workup   -cardiology team  following  - s/p EBUS; await lab tests results   -oncology team following the pt      Liver cirrhosis  -Ultrasound noted for subtotal nodular surface contour of the liver with mildly coarsened echotexture raising the possibility of cirrhosis and no focal as well as ascites seen  -HCC  Screening     #Erythrocytosis  -Hematocrit 59.9  - Serum EPO level low      Essential hypertension  - Continue to monitor  - Continue Norvasc     #History of anxiety-continue Xanax and Protonix     #COPD  #Suspected SHANKAR-outpatient sleep study  -Inhalers bronchodilators and IV steroids     Obesity BMI 32.82  - Lifestyle and dietary management    DVT prophylaxis:  Mechanical DVT prophylaxis orders are present.    Code status is   Code Status and Medical Interventions:   Ordered at: 24 0243     Level Of Support Discussed With:    Patient     Code Status (Patient has no pulse and is not breathing):    CPR (Attempt to Resuscitate)     Medical Interventions (Patient has pulse or is breathing):    Full Support       Plan for disposition: home  in 1-2  days, currently on 5 L of oxygen and does not have any oxygen at home.  Time: 35 minutes    Signature: Electronically signed by Kirk Flores MD, 24, 08:25 EDT.  [facility] Hospitalist Team     Electronically signed by Kirk Flores MD at 24 0849       Stacia Olson MD at 24 [facility]              [facility] MEDICINE SERVICE  DAILY PROGRESS NOTE    NAME: Jeanne King  : 1960  MRN: [id number]      LOS: 6 days     PROVIDER OF SERVICE: Stacia Olson MD    Chief Complaint: Acute hypoxic respiratory  failure    Subjective:     Interval History:  History taken from: patient  Patient Complaints: Headaches, ABG noted  patient to be on BiPAP overnight   denies: Nausea or vomiting    5/13 patient seen and examined, denies any chest pain shortness of breath, she is requiring 5 L of oxygen and does not use any oxygen at home at all.    Review of Systems:   Review of Systems  14 point review of system unremarkable except mentioned above  Objective:     Vital Signs  Temp:  [97.6 °F (36.4 °C)-98.5 °F (36.9 °C)] 98.5 °F (36.9 °C)  Heart Rate:  [50-66] 57  Resp:  [12-24] 14  BP: (118-146)/(59-85) 134/72  Flow (L/min):  [5] 5   Body mass index is 33.65 kg/m².    Physical Exam  Physical Exam  HENT:      Head: Atraumatic.      Mouth/Throat:      Mouth: Mucous membranes are moist.   Eyes:      Pupils: Pupils are equal, round, and reactive to light.   Cardiovascular:      Rate and Rhythm: Normal rate and regular rhythm.      Pulses: Normal pulses.      Heart sounds: Normal heart sounds.   Abdominal:      General: Bowel sounds are normal.      Palpations: Abdomen is soft.   Musculoskeletal:      Right lower leg: Edema present.      Left lower leg: Edema present.   Skin:     General: Skin is warm.   Neurological:      General: No focal deficit present.      Mental Status: She is alert and oriented to person, place, and time.   Psychiatric:         Mood and Affect: Mood normal.         Scheduled Meds   acetaZOLAMIDE (DIAMOX) 250 mg in sterile water (preservative free) 2.5 mL injection, 250 mg, Intravenous, Daily  budesonide, 0.5 mg, Nebulization, BID - RT  FLUoxetine, 10 mg, Oral, Daily  FLUoxetine, 20 mg, Oral, Daily  folic acid, 1 mg, Oral, Daily  furosemide, 40 mg, Oral, Daily  insulin lispro, 2-7 Units, Subcutaneous, 4x Daily AC & at Bedtime  ipratropium-albuterol, 3 mL, Nebulization, 4x Daily - RT  methylPREDNISolone sodium succinate, 40 mg, Intravenous, Q12H  metoprolol succinate XL, 25 mg, Oral, Q12H  miconazole, 1  Application, Topical, Q12H  nicotine, 1 patch, Transdermal, Q24H  potassium chloride, 20 mEq, Oral, BID With Meals  sodium chloride, 10 mL, Intravenous, Q12H       PRN Meds     acetaminophen    ALPRAZolam    senna-docusate sodium **AND** polyethylene glycol **AND** bisacodyl **AND** bisacodyl    dextrose    dextrose    glucagon (human recombinant)    ipratropium-albuterol    nitroglycerin    ondansetron    sodium chloride    sodium chloride   Infusions         Diagnostic Data    Results from last 7 days   Lab Units 05/12/24  0415 05/11/24  0300 05/10/24  0551   WBC 10*3/mm3 10.66   < > 10.46   HEMOGLOBIN g/dL 17.8*   < > 18.1*   HEMOGLOBIN, POC   --    < >  --    HEMATOCRIT % 58.7*   < > 59.8*   HEMATOCRIT POC   --    < >  --    PLATELETS 10*3/mm3 102*   < > 124*   GLUCOSE mg/dL 116*   < > 123*   CREATININE mg/dL 0.64   < > 0.71   BUN mg/dL 28*   < > 27*   SODIUM mmol/L 136   < > 137   POTASSIUM mmol/L 4.5   < > 5.0   AST (SGOT) U/L  --   --  36*   ALT (SGPT) U/L  --   --  45*   ALK PHOS U/L  --   --  92   BILIRUBIN mg/dL  --   --  0.9   ANION GAP mmol/L 6.0   < > 7.0    < > = values in this interval not displayed.       No radiology results for the last day      I reviewed the patient's new clinical results.    Assessment/Plan:     Active and Resolved Problems  #Acute hypoxemic respiratory failure  #Acute exacerbation of likely diastolic heart failure  #Acute pulmonary edema  # suspicious Malignant lung neoplasm and mediastinal   Lymphadenopathy  #Bilateral lower extremity edema likely from above  -Continue to supplement oxygen to keep saturation above 90 to 92%  - CT of the chest report as below  1. No pulmonary emboli 2. Bilateral rond glass patchy opacities nonspecific suggestion of pulmonary edema.  A right lower lobe 2.3 cm spiculated mass lesion, another more distal lesion 1.7 cm.  These lesions are highly concerning for primary lung neoplasm and recommend biopsy or PET/CT exam.  Lingular atelectasis.  No  pneumothorax or pleural effusion.  Mediastinal and hilar lymphadenopathy.  #3 cardiomegaly and coronary artery calcifications.  #4 cirrhotic enlarged liver.  Splenomegaly.  Abdominal ascites.  - afebrile. Negative for cough or fever. Negative leukocytosis. Does not appear to have pneumonia     Continue IV Lasix --will be transitioned to p.o. per cardiology recommendations.  - Transthoracic echocardiogram  noted ; out pt ischemic workup   -cardiology team  following  - s/p EBUS; await lab tests results   -oncology team following the pt      Liver cirrhosis     -Ultrasound noted for subtotal nodular surface contour of the liver with mildly coarsened echotexture raising the possibility of cirrhosis and no focal as well as ascites seen  -HCC  Screening     #Erythrocytosis  -Hematocrit 59.9  - Serum EPO level low      Essential hypertension  - Continue to monitor  - Continue Norvasc     #History of anxiety-continue Xanax and Protonix     #COPD  #Suspected SHANKAR-outpatient sleep study  -Inhalers bronchodilators and IV steroids     Obesity BMI 32.82  - Lifestyle and dietary management        DVT prophylaxis:  Mechanical DVT prophylaxis orders are present.         Code status is   Code Status and Medical Interventions:   Ordered at: 05/07/24 0243     Level Of Support Discussed With:    Patient     Code Status (Patient has no pulse and is not breathing):    CPR (Attempt to Resuscitate)     Medical Interventions (Patient has pulse or is breathing):    Full Support       Plan for disposition: home  in 1-2  days, currently on 5 L of oxygen and does not have any oxygen at home.  Time: 35 minutes    Signature: Electronically signed by Stacia Olson MD, 05/13/24, 13:52 EDT.  [facility] Hospitalist Team     Electronically signed by tSacia Olsno MD at 05/13/24 1355       Marianela Stewart MD at 05/13/24 1233          Daily Progress Note          Assessment    Acute hypoxic/hypercapnic respiratory failure   AE COPD  Hx COVID-19   RLL  spiculated mass lesion    HTN  Tobacco abuse  Obesity  Anxiety     PLAN:   Oxygen supplement and titration to maintain saturation 90-95%: Currently on 5 L per nasal cannula, BiPAP at bedtime and as needed       S/p EBUS 5/10/2024 with preliminary results neg for cancer will await final culture  Patient will need PET scan as an outpatient and if positive to schedule robotic/ion bronchoscopy  Encourage OOB during day   Bronchodilators  Inhaled corticosteroids  IV steroids   Incentive spirometer  Smoking cessation counseling, nicotine patch  Diuresis  Electrolytes/ glycemic control    I personally reviewed the radiological images               LOS: 6 days     Subjective     Patient reports cough and shortness of breath    Objective     Vital signs for last 24 hours:  Vitals:    05/13/24 0632 05/13/24 0949 05/13/24 1035 05/13/24 1045   BP:  127/68     BP Location:  Left arm     Patient Position:  Sitting     Pulse: 56 59 58 54   Resp: 20 18 16 16   Temp:  98.5 °F (36.9 °C)     TempSrc:  Oral     SpO2: 96% 90% 92% 96%   Weight:       Height:           Intake/Output last 3 shifts:  I/O last 3 completed shifts:  In: 700 [P.O.:700]  Out: 3690 [Urine:3690]  Intake/Output this shift:  I/O this shift:  In: 240 [P.O.:240]  Out: -       Radiology  Imaging Results (Last 24 Hours)       ** No results found for the last 24 hours. **            Labs:  Results from last 7 days   Lab Units 05/12/24  0415   WBC 10*3/mm3 10.66   HEMOGLOBIN g/dL 17.8*   HEMATOCRIT % 58.7*   PLATELETS 10*3/mm3 102*     Results from last 7 days   Lab Units 05/12/24  0415 05/11/24  0300 05/10/24  0551   SODIUM mmol/L 136   < > 137   POTASSIUM mmol/L 4.5   < > 5.0   CHLORIDE mmol/L 90*   < > 94*   CO2 mmol/L 40.0*   < > 36.0*   BUN mg/dL 28*   < > 27*   CREATININE mg/dL 0.64   < > 0.71   CALCIUM mg/dL 9.5   < > 9.2   BILIRUBIN mg/dL  --   --  0.9   ALK PHOS U/L  --   --  92   ALT (SGPT) U/L  --   --  45*   AST (SGOT) U/L  --   --  36*   GLUCOSE mg/dL 116*    < > 123*    < > = values in this interval not displayed.     Results from last 7 days   Lab Units 05/12/24  0857   PH, ARTERIAL pH units 7.384   PO2 ART mm Hg 79.7*   PCO2, ARTERIAL mm Hg 71.7*   HCO3 ART mmol/L 42.8*     Results from last 7 days   Lab Units 05/10/24  0551 05/07/24  0419   ALBUMIN g/dL 3.4* 3.0*     Results from last 7 days   Lab Units 05/08/24  1016 05/08/24  0618   HSTROP T ng/L 22* 19*         Results from last 7 days   Lab Units 05/10/24  0551   MAGNESIUM mg/dL 1.9         Results from last 7 days   Lab Units 05/07/24  0419   TSH uIU/mL 0.611           Meds:   SCHEDULE  acetaZOLAMIDE (DIAMOX) 250 mg in sterile water (preservative free) 2.5 mL injection, 250 mg, Intravenous, Daily  budesonide, 0.5 mg, Nebulization, BID - RT  FLUoxetine, 10 mg, Oral, Daily  FLUoxetine, 20 mg, Oral, Daily  folic acid, 1 mg, Oral, Daily  furosemide, 40 mg, Oral, Daily  insulin lispro, 2-7 Units, Subcutaneous, 4x Daily AC & at Bedtime  ipratropium-albuterol, 3 mL, Nebulization, 4x Daily - RT  methylPREDNISolone sodium succinate, 40 mg, Intravenous, Q12H  metoprolol succinate XL, 25 mg, Oral, Q12H  miconazole, 1 Application, Topical, Q12H  nicotine, 1 patch, Transdermal, Q24H  potassium chloride, 20 mEq, Oral, BID With Meals  sodium chloride, 10 mL, Intravenous, Q12H      Infusions     PRNs    acetaminophen    ALPRAZolam    senna-docusate sodium **AND** polyethylene glycol **AND** bisacodyl **AND** bisacodyl    dextrose    dextrose    glucagon (human recombinant)    ipratropium-albuterol    nitroglycerin    ondansetron    sodium chloride    sodium chloride    Physical Exam:  General Appearance:  Alert   HEENT:  Normocephalic, without obvious abnormality, Conjunctiva/corneas clear,.   Nares normal, no drainage     Neck:  Supple, symmetrical, trachea midline.   Lungs /Chest wall:   Bilateral basal rhonchi, respirations unlabored, symmetrical wall movement.     Heart:  Regular rate and rhythm, S1 S2 normal  Abdomen:  Soft, non-tender, no masses, no organomegaly.    Extremities: No edema, no clubbing or cyanosis     ROS  Constitutional: Negative for chills, fever and malaise/fatigue.   HENT: Negative.    Eyes: Negative.    Cardiovascular: Negative.    Respiratory: Positive for cough and shortness of breath.    Skin: Negative.    Musculoskeletal: Negative.    Gastrointestinal: Negative.    Genitourinary: Negative.    Neurological: Negative.    Psychiatric/Behavioral: Negative.      I reviewed the recent clinical results  I personally reviewed the latest radiological studies    Part of this note may be an electronic transcription/translation of spoken language to printed text using the Dragon Dictation System.      Electronically signed by Marianela Stewart MD at 24 1239       Rosmery Dunn APRN at 24 1145          Cardiology [place]        LOS:  LOS: 6 days   Patient Name: Jeanne King  Age/Sex: 64 y.o. female  : 1960  MRN: [id number]    Day of Service: 24   Length of Stay: 6  Encounter Provider: FRANCIS Wills  Place of Service: [facility] CARDIOLOGY  Patient Care Team:  Provider, No Known as PCP - General    Subjective:     Chief Complaint: f/u shortness of breath / edema    Subjective: patient remains on supplemental O2, daughter at bedside, concerned about intermittent confusion patient has been experiencing   Daughter requesting ammonia level be checked and urine for UTI  She does have foul smelling urine  ABG yesterday PCO2 in the 70s which is down from prior ABGs    Current Medications:   Scheduled Meds:acetaZOLAMIDE (DIAMOX) 250 mg in sterile water (preservative free) 2.5 mL injection, 250 mg, Intravenous, Daily  budesonide, 0.5 mg, Nebulization, BID - RT  FLUoxetine, 10 mg, Oral, Daily  FLUoxetine, 20 mg, Oral, Daily  folic acid, 1 mg, Oral, Daily  furosemide, 40 mg, Oral, Daily  insulin lispro, 2-7 Units, Subcutaneous, 4x Daily AC & at  Bedtime  ipratropium-albuterol, 3 mL, Nebulization, 4x Daily - RT  methylPREDNISolone sodium succinate, 40 mg, Intravenous, Q12H  metoprolol succinate XL, 25 mg, Oral, Q12H  miconazole, 1 Application, Topical, Q12H  nicotine, 1 patch, Transdermal, Q24H  potassium chloride, 20 mEq, Oral, BID With Meals  sodium chloride, 10 mL, Intravenous, Q12H      Continuous Infusions:     Allergies:  Allergies   Allergen Reactions    Keflex [Cephalexin] Mental Status Change and Provider Review Needed    Morphine Mental Status Change    Premarin [Conjugated Estrogens] Mental Status Change       Review of Systems   Constitutional: Positive for malaise/fatigue. Negative for chills and diaphoresis.   Cardiovascular:  Positive for dyspnea on exertion. Negative for chest pain, irregular heartbeat, leg swelling, near-syncope, orthopnea, palpitations, paroxysmal nocturnal dyspnea and syncope.   Respiratory:  Negative for cough, shortness of breath, sleep disturbances due to breathing and sputum production.    Gastrointestinal:  Negative for change in bowel habit.   Genitourinary:  Negative for urgency.   Neurological:  Negative for dizziness and headaches.   Psychiatric/Behavioral:  Negative for altered mental status.          Objective:     Temp:  [97.6 °F (36.4 °C)-98.5 °F (36.9 °C)] 98.5 °F (36.9 °C)  Heart Rate:  [50-66] 54  Resp:  [11-24] 16  BP: (118-146)/(59-85) 127/68     Intake/Output Summary (Last 24 hours) at 5/13/2024 1145  Last data filed at 5/13/2024 1100  Gross per 24 hour   Intake 460 ml   Output 2200 ml   Net -1740 ml     Body mass index is 33.65 kg/m².      05/10/24  0500 05/10/24  0743 05/11/24  0500   Weight: 102 kg (224 lb 13.9 oz) 96.2 kg (212 lb) 100 kg (221 lb 5.5 oz)         General Appearance:    Alert, cooperative, in no acute distress                                Head: Atraumatic, normocephalic, PERRLA               Neck:   supple, no JVD   Lungs:     Supplemental O2, dim bilat bases    Heart:    Regular  rhythm and normal rate, normal S1 and S2   Abdomen:     Normal bowel sounds, no masses, no organomegaly, soft  nontender, nondistended, no guarding, no rebound  tenderness   Extremities:   Moves all extremities well, edema, no cyanosis, no  redness   Pulses:   Pulses palpable and equal bilaterally   Skin:   No bleeding, bruising or rash   Neurologic:   Awake, alert, oriented x3, intermittent confusion         Lab Review:   Results from last 7 days   Lab Units 05/12/24 0415 05/11/24  0414 05/11/24  0300 05/10/24  0551 05/08/24  0618 05/07/24  0419   SODIUM mmol/L 136 136  --  137   < > 140   POTASSIUM mmol/L 4.5 4.1  --  5.0   < > 3.8   CHLORIDE mmol/L 90* 92*  --  94*   < > 99   CO2 mmol/L 40.0* 40.0*  --  36.0*   < > 31.0*   BUN mg/dL 28* 28*  --  27*   < > 12   CREATININE mg/dL 0.64 0.71   < > 0.71   < > 0.59   GLUCOSE mg/dL 116* 235*  --  123*   < > 102*   CALCIUM mg/dL 9.5 9.3  --  9.2   < > 8.3*   AST (SGOT) U/L  --   --   --  36*  --  19   ALT (SGPT) U/L  --   --   --  45*  --  16    < > = values in this interval not displayed.     Results from last 7 days   Lab Units 05/08/24  1016 05/08/24  0618   HSTROP T ng/L 22* 19*     Results from last 7 days   Lab Units 05/12/24 0415 05/11/24 0414   WBC 10*3/mm3 10.66 8.42   HEMOGLOBIN g/dL 17.8* 17.5*   HEMATOCRIT % 58.7* 58.4*   PLATELETS 10*3/mm3 102* 105*         Results from last 7 days   Lab Units 05/10/24  0551 05/09/24  0308   MAGNESIUM mg/dL 1.9 1.9     Results from last 7 days   Lab Units 05/07/24 0419   CHOLESTEROL mg/dL 81   TRIGLYCERIDES mg/dL 60   HDL CHOL mg/dL 34*     Results from last 7 days   Lab Units 05/07/24 0419   PROBNP pg/mL 1,213.0*     Results from last 7 days   Lab Units 05/07/24  0419   TSH uIU/mL 0.611       Recent Radiology:  Imaging Results (Most Recent)       Procedure Component Value Units Date/Time    XR Chest 1 View [[id number]] Collected: 05/11/24 1119     Updated: 05/11/24 1124    Narrative:      XR CHEST 1 VW    Date of Exam:  5/11/2024 6:03 AM EDT    Indication: H/O CHF    Comparison: 5/10/2024    Findings:  Cardiomegaly is stable. There is persistent pulmonary vascular congestion. Pulmonary interstitial edema appears improved. There is improving bibasilar airspace disease. No pleural effusion. No pneumothorax. Bony structures are unremarkable.      Impression:      Impression:    1. Stable cardiomegaly.  2. Slight improvement in bibasilar airspace disease.      Electronically Signed: Noble Hernandez MD    5/11/2024 11:22 AM EDT    Workstation ID: [id number]    XR Chest 1 View [[id number]] Collected: 05/10/24 0920     Updated: 05/10/24 0924    Narrative:      XR CHEST 1 VW    Date of Exam: 5/10/2024 9:11 AM EDT    Indication: post op    Comparison: None available.    Findings:  Cardiac silhouette is enlarged. Pulmonary vasculature is indistinct. Scattered bibasilar atelectasis is present. No significant pleural effusion or pneumothorax. No acute osseous lesion is seen.      Impression:      Impression:  1.Cardiomegaly with pulmonary vascular congestion and scattered bibasilar atelectasis.      Electronically Signed: Frankie Matson MD    5/10/2024 9:22 AM EDT    Workstation ID: [id number]    CT Outside Films [[id number]] Resulted: 05/08/24 0935     Updated: 05/08/24 0935    Narrative:      This procedure was auto-finalized with no dictation required.    XR Outside Films [[id number]] Resulted: 05/08/24 0935     Updated: 05/08/24 0935    Narrative:      This procedure was auto-finalized with no dictation required.    US Liver [[id number]] Collected: 05/07/24 0731     Updated: 05/07/24 0735    Narrative:      DATE OF EXAM:  5/7/2024 4:35 AM     PROCEDURE:  US LIVER-     INDICATIONS:  ascites, eval for cirrhosis     COMPARISON:  No Comparisons Available     TECHNIQUE:   Grayscale and color Doppler ultrasound evaluation of the limited abdomen  was performed.     FINDINGS:  The liver is enlarged, 20.0 cm in length. The liver echotexture is  coarsened.  "There is a subtle nodular surface contour of the liver  suggestive of cirrhosis. No focal liver lesion is identified. There is a  \"starry eric\" pattern of the liver parenchyma, which may also reflect  changes of chronic liver disease. No ascites is evident. Main portal  vein is patent with hepatopetal flow. The imaged hepatic veins are  patent. Common bile duct caliber is within normal limits, 6 mm. No  intrahepatic biliary ductal dilation is observed. The intrahepatic IVC  demonstrates color flow.       Impression:      1. Hepatomegaly.  2. Subtle nodular surface contour of the liver with mildly coarsened  echotexture, raising the possibility of cirrhosis.  3. No focal liver lesion is seen.  4. No ascites is seen.     Electronically Signed By-Jane Woodruff MD On:5/7/2024 7:33 AM               ECHOCARDIOGRAM:    Results for orders placed during the hospital encounter of 05/07/24    Adult Transthoracic Echo Complete W/ Cont if Necessary Per Protocol    Interpretation Summary    Left ventricular systolic function is normal. Left ventricular ejection fraction appears to be 56 - 60%.    Left ventricular diastolic function is consistent with (grade I) impaired relaxation.    Mildly reduced right ventricular systolic function noted.    The right ventricular cavity is moderately dilated.    Estimated right ventricular systolic pressure from tricuspid regurgitation is normal (<35 mmHg).    IVC is dilated        I reviewed the patient's new clinical results.    EKG:      Assessment:       Acute hypoxic respiratory failure    Lung mass    Acute hypoxic and hypercapnic respiratory insufficiency  Volume overload / HFpEF / RV dysfunction  HTN  Tobacco abuse  Abnormal CT at OSH with Concern for primary lung neoplasm with 2 lung lesions identified with mediastinal and hilar lymphadenopathy and cirrhotic changes of liver  Polycythemia  Intermittent confusion       Plan:   Patient admitted with shortness of breath, lower extremity " edema, long term smoker, has not seen physician in a number of years, CT at [facility] with concern for lung neoplasm and hilar lymphadenopathy  Volume status improving  Stress test as outpt once improves from lung standpoint / on less O2  Needs SHANKAR work up as outpt      Family concerned about intermittent confusion, want UTI ruled out and ammonia checked, will check CMP also            FRANCIS Wills  05/13/24  11:45 EDT    Electronically signed by Rosmery Dunn APRN at 05/13/24 1148       Consult Notes (last 24 hours)  Notes from 05/13/24 0919 through 05/14/24 0919   No notes of this type exist for this encounter.          Physical Therapy Notes (last 24 hours)        Austin Raymundo, PT at 05/13/24 1240  Version 1 of 1         Goal Outcome Evaluation:  Plan of Care Reviewed With: patient, daughter        Progress: improving   Pt is a 63 y/o F admitted to [facility] on 5/7/24 with complaints of feeling unwell for a long time, BLE swelling and weeping, and acute respiratory hypoxia. CT Chest (+) for multiple lung lesions and US (+) for liver cirrhosis. Admitted to [facility] for acute hypoxemic respiratory failure, acute pulmonary edema, and CHF exacerbation. She is currently living at home alone, but planning on returning back to daughter's house in [facility] with two steps to enter. At baseline, she is normally IND with household mobility and ADLs with no AD. No home O2. Daughter has been assisting with cooking and cleaning recently. This date, she is AAOx4 and sitting up in recliner with no complaints of pain reading 90% on 5L. She completes transfers SBA and amb 100' CGA with no AD. O2 sats remained stable this date, but pt still requiring 5-6L supplemental O2. Exhibits decreased endurance this date, but seems to be general deconditioning at baseline. Pt would likely benefit from O2 walking test prior to d/c as she continues to need supplemental O2 at rest and with activity. PT is recommending HHPT at d/c and will continue to follow  during stay.     Anticipated Discharge Disposition (PT): home with home health      Electronically signed by Austin Raymundo, PT at 24 1243       Austin Raymundo, PT at 24 1243  Version 1 of 1         Patient Name: Jeanne King  : 1960    MRN: [id number]                              Today's Date: 2024       Admit Date: 2024    Visit Dx:     ICD-10-CM ICD-9-CM   1. Lung mass  R91.8 786.6     Patient Active Problem List   Diagnosis    Acute hypoxic respiratory failure    Lung mass     Past Medical History:   Diagnosis Date    COPD (chronic obstructive pulmonary disease)     Hypertension      Past Surgical History:   Procedure Laterality Date    BRONCHOSCOPY N/A 5/10/2024    Procedure: BRONCHOSCOPY WITH BRONCHOALVEOLAR LAVAGE AND ENDOBRONCHIAL ULTRASOUND WITH FINE NEEDLE ASPIRATION x2;  Surgeon: Bessy Mcguire MD;  Location[facility] ENDOSCOPY;  Service: Pulmonary;  Laterality: N/A;  lung mass    HYSTERECTOMY        General Information       Row Name 24 1233          Physical Therapy Time and Intention    Document Type evaluation  -MB     Mode of Treatment physical therapy  -MB       Row Name 24 1233          General Information    Patient Profile Reviewed yes  -MB     Prior Level of Function independent:;all household mobility;community mobility;gait;transfer;ADL's;home management;driving  -MB     Existing Precautions/Restrictions no known precautions/restrictions  -MB     Barriers to Rehab none identified  -MB       Row Name 24 1233          Living Environment    People in Home alone  -MB       Row Name 24 1233          Home Main Entrance    Number of Stairs, Main Entrance two  -MB       Row Name 24 1233          Stairs Within Home, Primary    Number of Stairs, Within Home, Primary none  -MB       Row Name 24 1233          Cognition    Orientation Status (Cognition) oriented x 4  -MB       Row Name 24 1233          Safety Issues, Functional Mobility     Impairments Affecting Function (Mobility) endurance/activity tolerance;strength  -MB               User Key  (r) = Recorded By, (t) = Taken By, (c) = Cosigned By      Initials Name Provider Type    Austin Mota PT Physical Therapist                   Mobility       Row Name 05/13/24 1234          Bed Mobility    Bed Mobility bed mobility (all) activities  -MB     All Activities, [place] (Bed Mobility) not tested  -MB     Comment, (Bed Mobility) Pt began and ended session sitting in recliner  -MB       Row Name 05/13/24 1234          Bed-Chair Transfer    Bed-Chair [place] (Transfers) standby assist  -MB     Comment, (Bed-Chair Transfer) no AD  -MB       Row Name 05/13/24 1234          Sit-Stand Transfer    Sit-Stand [place] (Transfers) standby assist  -MB     Comment, (Sit-Stand Transfer) no AD  -MB       Row Name 05/13/24 1234          Gait/Stairs (Locomotion)    [place] Level (Gait) contact guard  -MB     Patient was able to Ambulate yes  -MB     Distance in Feet (Gait) 100  -MB     Comment, (Gait/Stairs) 100' CGA with no AD  -MB               User Key  (r) = Recorded By, (t) = Taken By, (c) = Cosigned By      Initials Name Provider Type    Austin Mota, PT Physical Therapist                   Obj/Interventions       Row Name 05/13/24 1235          Range of Motion Comprehensive    General Range of Motion no range of motion deficits identified  -MB       Row Name 05/13/24 1235          Strength Comprehensive (MMT)    Comment, General Manual Muscle Testing (MMT) Assessment BLE Strength 4/5 grossly  -MB       Row Name 05/13/24 1235          Balance    Balance Assessment sitting static balance;sitting dynamic balance;sit to stand dynamic balance;standing static balance;standing dynamic balance  -MB     Static Sitting Balance independent  -MB     Dynamic Sitting Balance independent  -MB     Position, Sitting Balance supported;sitting in chair  -MB     Sit to Stand Dynamic Balance contact  guard  -MB     Static Standing Balance standby assist  -MB     Dynamic Standing Balance contact guard  -MB       Row Name 05/13/24 1235          Sensory Assessment (Somatosensory)    Sensory Assessment (Somatosensory) sensation intact  -MB               User Key  (r) = Recorded By, (t) = Taken By, (c) = Cosigned By      Initials Name Provider Type    Austin Mota, PT Physical Therapist                   Goals/Plan       Row Name 05/13/24 1237          Bed Mobility Goal 1 (PT)    Activity/Assistive Device (Bed Mobility Goal 1, PT) bed mobility activities, all  -MB     [place] Level/Cues Needed (Bed Mobility Goal 1, PT) independent  -MB     Time Frame (Bed Mobility Goal 1, PT) long term goal (LTG);2 weeks  -MB       Row Name 05/13/24 1237          Transfer Goal 1 (PT)    Activity/Assistive Device (Transfer Goal 1, PT) transfers, all  -MB     [place] Level/Cues Needed (Transfer Goal 1, PT) independent  -MB     Time Frame (Transfer Goal 1, PT) long term goal (LTG);2 weeks  -MB       Row Name 05/13/24 1237          Gait Training Goal 1 (PT)    Activity/Assistive Device (Gait Training Goal 1, PT) gait (walking locomotion)  -MB     [place] Level (Gait Training Goal 1, PT) standby assist  -MB     Distance (Gait Training Goal 1, PT) 200  -MB     Time Frame (Gait Training Goal 1, PT) long term goal (LTG);2 weeks  -MB       Row Name 05/13/24 1237          Therapy Assessment/Plan (PT)    Planned Therapy Interventions (PT) balance training;bed mobility training;gait training;home exercise program;neuromuscular re-education;patient/family education;strengthening;transfer training  -MB               User Key  (r) = Recorded By, (t) = Taken By, (c) = Cosigned By      Initials Name Provider Type    Austin Mota, PT Physical Therapist                   Clinical Impression       Row Name 05/13/24 1236          Pain    Pretreatment Pain Rating 0/10 - no pain  -MB     Posttreatment Pain Rating 0/10 - no pain  -MB        Row Name 05/13/24 1243 05/13/24 1236       Plan of Care Review    Plan of Care Reviewed With -- patient;daughter  -MB    Progress -- improving  -MB    Outcome Evaluation Pt is a 63 y/o F admitted to [facility] on 5/7/24 with complaints of feeling unwell for a long time, BLE swelling and weeping, and acute respiratory hypoxia. CT Chest (+) for multiple lung lesions and US (+) for liver cirrhosis. Admitted to [facility] for acute hypoxemic respiratory failure, acute pulmonary edema, and CHF exacerbation. She is currently living at home alone, but planning on returning back to daughter's house in [place] with two steps to enter. At baseline, she is normally IND with household mobility and ADLs with no AD. No home O2. Daughter has been assisting with cooking and cleaning recently. This date, she is AAOx4 and sitting up in recliner with no complaints of pain reading 90% on 5L. She completes transfers SBA and amb 100' CGA with no AD. O2 sats remained stable this date, but pt still requiring 5-6L supplemental O2. Exhibits decreased endurance this date, but seems to be general deconditioning at baseline. Pt would likely benefit from O2 walking test prior to d/c as she continues to need supplemental O2 at rest and with activity. PT is recommending HHPT at d/c and will continue to follow during stay.  -MB --      Row Name 05/13/24 1236          Therapy Assessment/Plan (PT)    Rehab Potential (PT) good, to achieve stated therapy goals  -MB     Criteria for Skilled Interventions Met (PT) yes;meets criteria  -MB     Therapy Frequency (PT) 3 times/wk  -MB     Predicted Duration of Therapy Intervention (PT) until d/c  -MB       Row Name 05/13/24 1236          Vital Signs    Pretreatment Heart Rate (beats/min) 54  -MB     Posttreatment Heart Rate (beats/min) 52  -MB     Pre SpO2 (%) 90  -MB     O2 Delivery Pre Treatment supplemental O2  5L  -MB     Intra SpO2 (%) 91  -MB     O2 Delivery Intra Treatment supplemental O2  6L  -MB     Post SpO2 (%)  92  -MB     O2 Delivery Post Treatment supplemental O2  5L  -MB     Pre Patient Position Sitting  -MB     Intra Patient Position Standing  -MB     Post Patient Position Sitting  -MB       Row Name 05/13/24 1236          Positioning and Restraints    Pre-Treatment Position sitting in chair/recliner  -MB     Post Treatment Position chair  -MB     In Chair notified nsg;reclined;sitting;call light within reach;encouraged to call for assist;exit alarm on;with family/caregiver  -MB               User Key  (r) = Recorded By, (t) = Taken By, (c) = Cosigned By      Initials Name Provider Type    Austin Mota, PT Physical Therapist                   Outcome Measures       Row Name 05/13/24 1238 05/13/24 0800       How much help from another person do you currently need...    Turning from your back to your side while in flat bed without using bedrails? 4  -MB 4  -LASHELL    Moving from lying on back to sitting on the side of a flat bed without bedrails? 4  -MB 4  -LASHELL    Moving to and from a bed to a chair (including a wheelchair)? 4  -MB 3  -LASHELL    Standing up from a chair using your arms (e.g., wheelchair, bedside chair)? 4  -MB 3  -LASHELL    Climbing 3-5 steps with a railing? 3  -MB 3  -LASHELL    To walk in hospital room? 3  -MB 3  -LASHELL    AM-PAC 6 Clicks Score (PT) 22  -MB 20  -LASHELL    Highest Level of Mobility Goal 7 --> Walk 25 feet or more  -MB 6 --> Walk 10 steps or more  -LASHELL              User Key  (r) = Recorded By, (t) = Taken By, (c) = Cosigned By      Initials Name Provider Type    Lucy Blakely RN Registered Nurse    Austin Mota, PT Physical Therapist                                 Physical Therapy Education       Title: PT OT SLP Therapies (Done)       Topic: Physical Therapy (Done)       Point: Mobility training (Done)       Learning Progress Summary             Patient Acceptance, E,TB, VU by MB at 5/13/2024 1238                         Point: Body mechanics (Done)       Learning Progress Summary              Patient Acceptance, E,TB, VU by MB at 5/13/2024 1238                         Point: Precautions (Done)       Learning Progress Summary             Patient Acceptance, E,TB, VU by MB at 5/13/2024 1238                                         User Key       Initials Effective Dates Name Provider Type Discipline    MB 06/06/23 -  Austin Raymundo, PT Physical Therapist PT                  PT Recommendation and Plan  Planned Therapy Interventions (PT): balance training, bed mobility training, gait training, home exercise program, neuromuscular re-education, patient/family education, strengthening, transfer training  Plan of Care Reviewed With: patient, daughter  Progress: improving  Outcome Evaluation: Pt is a 65 y/o F admitted to [facility] on 5/7/24 with complaints of feeling unwell for a long time, BLE swelling and weeping, and acute respiratory hypoxia. CT Chest (+) for multiple lung lesions and US (+) for liver cirrhosis. Admitted to [facility] for acute hypoxemic respiratory failure, acute pulmonary edema, and CHF exacerbation. She is currently living at home alone, but planning on returning back to daughter's house in [place] with two steps to enter. At baseline, she is normally IND with household mobility and ADLs with no AD. No home O2. Daughter has been assisting with cooking and cleaning recently. This date, she is AAOx4 and sitting up in recliner with no complaints of pain reading 90% on 5L. She completes transfers SBA and amb 100' CGA with no AD. O2 sats remained stable this date, but pt still requiring 5-6L supplemental O2. Exhibits decreased endurance this date, but seems to be general deconditioning at baseline. Pt would likely benefit from O2 walking test prior to d/c as she continues to need supplemental O2 at rest and with activity. PT is recommending HHPT at d/c and will continue to follow during stay.     Time Calculation:   PT Evaluation Complexity  History, PT Evaluation Complexity: 1-2 personal factors and/or  comorbidities  Examination of Body Systems (PT Eval Complexity): total of 3 or more elements  Clinical Presentation (PT Evaluation Complexity): evolving  Clinical Decision Making (PT Evaluation Complexity): moderate complexity  Overall Complexity (PT Evaluation Complexity): moderate complexity     PT Charges       Row Name 05/13/24 1238             Time Calculation    Start Time 1013  -MB      Stop Time 1036  -MB      Time Calculation (min) 23 min  -MB      PT Received On 05/13/24  -MB      PT - Next Appointment 05/15/24  -MB      PT Goal Re-Cert Due Date 05/27/24  -MB         Time Calculation- PT    Total Timed Code Minutes- PT 0 minute(s)  -MB                User Key  (r) = Recorded By, (t) = Taken By, (c) = Cosigned By      Initials Name Provider Type    Austin Mota, PT Physical Therapist                  Therapy Charges for Today       Code Description Service Date Service Provider Modifiers Qty    36139887055 HC PT EVAL MOD COMPLEXITY 4 5/13/2024 Austin Raymundo, PT GP 1            PT G-Codes  AM-PAC 6 Clicks Score (PT): 22  PT Discharge Summary  Anticipated Discharge Disposition (PT): home with home health    Austin Raymundo PT  5/13/2024      Electronically signed by Austin Raymundo PT at 05/13/24 1243       Occupational Therapy Notes (last 24 hours)  Notes from 05/13/24 0919 through 05/14/24 0919   No notes exist for this encounter.

## 2024-05-14 NOTE — CASE MANAGEMENT/SOCIAL WORK
Case Management Discharge Note        Transportation Services  Private: Car    Final Discharge Disposition Code: 01 - home or self-care    Continued Stay Note  SAAD Brewer     Patient Name: Jeanne King  MRN: [id number]  Today's Date: 5/14/2024    Admit Date: 5/7/2024    Plan: Home alone. [facility] [facility] for new o2 and nebulizer.   Discharge Plan       Row Name 05/14/24 1834       Plan    Plan Home alone. [employer] for new o2 and nebulizer.    Plan Comments Discussed possible needs for O2.  Discussed with patient. Agreeable with juan francisco. Referal sent, needs 4L, order obtained, Juan Francisco delivering o2 to bedside, Patient stated prefers [employer].  Juan Francisco notified this CM.  Notified [facility].  Orders faxed.  Family to  O2 tank at [facility] location.  [place] location to deliver O2 concentrator to home this evening.   Later notfied by RN of need for Nebulizer.   Requested order, order later noted and faxed to [facility]. WB to delver nebulizer to home with the O2 concentrator.   MD and RN aware.                    Expected Discharge Date and Time       Expected Discharge Date Expected Discharge Time    May 14, 2024               Yolanda Medina RN     Office Phone [phone]  Office Cell [phone]

## 2024-05-14 NOTE — PROCEDURES
Exercise Oximetry    Patient Name:Jeanne King   MRN: [id number]   Date: 05/14/24             ROOM AIR BASELINE   SpO2% 88   Heart Rate 56   Blood Pressure      EXERCISE ON ROOM AIR SpO2% EXERCISE ON O2 @ 4 LPM SpO2%   1 MINUTE  1 MINUTE          on 2L 87%   2 MINUTES  2 MINUTES        on 3L 88%   3 MINUTES  3 MINUTES        on 3L  86%   4 MINUTES  4 MINUTES        on 4L  90%   5 MINUTES  5 MINUTES        on 4L  91%   6 MINUTES  6 MINUTES        on 4L 92%              Distance Walked   Distance Walked   Dyspnea (Eden Scale)   Dyspnea (Eden Scale)   Fatigue (Eden Scale)   Fatigue (Eden Scale)   SpO2% Post Exercise  91-92% SpO2% Post Exercise   HR Post Exercise  72    Time to Recovery   Time to Recovery     Comments: Pt required 4L of oxygen with ambulation to maintain SpO2 of 91-92%.

## 2024-05-14 NOTE — DISCHARGE PLACEMENT REQUEST
"Neville King (64 y.o. Female)       Date of Birth   1960    Social Security Number       Address   [address] [place] [address] [address] [facility] [place] [address]    Home Phone   [phone]    MRN   [id number]       Jew   None    Marital Status                               Admission Date   5/7/24    Admission Type   Urgent    Admitting Provider   Tiarra Renee MD    Attending Provider   Kirk Flores MD    Department, Room/Bed   [facility], 2101/1       Discharge Date       Discharge Disposition   Home or Self Care    Discharge Destination                                 Attending Provider: Kirk Flores MD    Allergies: Keflex [Cephalexin], Morphine, Premarin [Conjugated Estrogens]    Isolation: None   Infection: None   Code Status: CPR    Ht: 172.7 cm (68\")   Wt: 97.8 kg (215 lb 9.8 oz)    Admission Cmt: None   Principal Problem: Acute hypoxic respiratory failure [J96.01]                   Active Insurance as of 5/7/2024       Primary Coverage       Payor Plan Insurance Group Employer/Plan Group    ANTHEM MEDICARE REPLACEMENT ANTHEM MEDICARE ADVANTAGE [id number]       Payor Plan Address Payor Plan Phone Number Payor Plan Fax Number Effective Dates    [address] [phone]  1/1/2024 - None Entered    [facility] [address]         Subscriber Name Subscriber Birth Date Member ID       NEVILLE KING 1960 [id number]               Secondary Coverage       Payor Plan Insurance Group Employer/Plan Group    [id number]        Payor Plan Address Payor Plan Phone Number Payor Plan Fax Number Effective Dates    [address]   1/1/2024 - None Entered    [place] [address]         Subscriber Name Subscriber Birth Date Member ID       NEVILLE KING 1960 [id number]                     Emergency Contacts        (Rel.) Home Phone Work Phone Mobile Phone    SUKH KING (Daughter) -- -- [phone]               "   History & Physical        Mariah Cheney APRN at 24 0248       Attestation signed by Tiarra Renee MD at 24 [phone]    I have reviewed this documentation and agree.                      [facility]    Hospitalist History and Physical     Jeanne Fernando : 1960 MRN:[id number] LOS:0 ROOM: [facility]     Reason for admission: Acute hypoxic respiratory failure     Assessment / Plan     Acute hypoxic respiratory failure  Possibly mild encephalopathy - resolved   Due to acute pulmonary edema with possible underlying COPD  Pt reports is a COVID long hauler  Also rule out cirrhosis with ascites  BLE edema   - responded well to lasix   - Reportedly sats in the 70s on admission. Placed initially on 6L then escalted to 30L high flow. Briefly on Bi-pap. Now sats 91% on 5L NC   - CT chest: 1. No pulmonary emboli 2. Bilateral rond glass patchy opacities nonspecific suggestion of pulmonary edema.  A right lower lobe 2.3 cm spiculated mass lesion, another more distal lesion 1.7 cm.  These lesions are highly concerning for primary lung neoplasm and recommend biopsy or PET/CT exam.  Lingular atelectasis.  No pneumothorax or pleural effusion.  Mediastinal and hilar lymphadenopathy.  #3 cardiomegaly and coronary artery calcifications.  #4 cirrhotic enlarged liver.  Splenomegaly.  Abdominal ascites.  - afebrile. Negative for cough or fever. Negative leukocytosis. Does not appear to have pneumonia  - VBG: pH 7.53, PCO2 35, Po2 101, bicarb 29 O2 98%   - will order 2D echo to evaluate cardiomegaly  - will order US liver to evaluate cirrhosis  - I&O  - daily weight  -duo neb prn   - Pulmonary consult  - consider cardiology, GI, or oncology consult based on results     HTN  - resume home Norvasc    Anxiety  - resume home Xanax and Prozac    COPD  - resume home inhalers Albuterol and Advair    Tobacco dependency  -recommend complete cessation  -nicotine patch as needed    Obesity  - BMI 32.82  -  "recommend diet and lifestyle changes       Level Of Support Discussed With: Patient  Code Status (Patient has no pulse and is not breathing): CPR (Attempt to Resuscitate)  Medical Interventions (Patient has pulse or is breathing): Full Support       Nutrition:   Diet: Cardiac; Healthy Heart (2-3 Na+); Fluid Consistency: Thin (IDDSI 0)     DVT prophylaxis:  Mechanical DVT prophylaxis orders are present.         History of Present illness     Jeanne King is a 64 y.o. female with PMH of hypertension, COPD, anxiety presented [facility] to [facility].  Patient reports she has been feeling unwell for \"a long time\".  She does not use oxygen at home.  Uses albuterol and Advair.  Does not like going to the doctor and very anxious and was not willing to seek medical help until her daughter persuaded her due to acute edema to bilateral lower extremities with signs of weeping.  Patient denies any cough fever chills.  Positive for shortness of breath.  Reports family history of sarcoidosis and Rodriguez cirrhosis    Patient reports feels that she \"was not thinking straight\" when she arrived in the ED.  She remembers getting told that her oxygen was in the 70s and she recalls thinking \"that is not bad\".  Once O2 improved to normal range she realized that she had been slightly delirious.  It is reported that she was initially on 6 L nasal cannula but then escalated to 30 L high flow with FiO2 of 60.  She was given 40 of Lasix with approximately 1 L of output.  I requested patient be placed on BiPAP which she did tolerate shortly .VBG showed pH 7.53, pCO2 35, pO2 101, bicarb 29 and O2 sat 98.  COVID was negative negative for leukocytosis.  Hemoglobin 19.4 and platelet 125.  Other labs reviewed potassium 3.4.  High-sensitivity troponin was 34 lactate 1.3 .  CT of the chest was negative for pulmonary emboli.  Concern for primary lung neoplasm with 2 lung lesions identified with mediastinal and hilar lymphadenopathy.  Cardiomegaly " and coronary artery calcifications.  Cirrhotic enlarged liver splenomegaly and abdominal ascites.  She was transferred to [facility].  On arrival patient sats 91% on 5 L nasal cannula.  She is negative for any respiratory distress.  She has been admitted for further testing    Patient was seen and examined on 05/07/24 at 04:13 EDT .    Subjective / Review of systems     Review of Systems   Constitutional:  Positive for activity change and fatigue.   Respiratory:  Positive for shortness of breath.    Cardiovascular:  Positive for leg swelling.          Past Medical/Surgical/Social/Family History & Allergies     Past Medical History:   Diagnosis Date    COPD (chronic obstructive pulmonary disease)     Hypertension       Past Surgical History:   Procedure Laterality Date    HYSTERECTOMY        Social History     Socioeconomic History    Marital status:    Tobacco Use    Smoking status: Every Day     Current packs/day: 1.50     Average packs/day: 1.5 packs/day for 53.3 years (80.0 ttl pk-yrs)     Types: Cigarettes     Start date: 1971    Smokeless tobacco: Never   Vaping Use    Vaping status: Never Used   Substance and Sexual Activity    Alcohol use: Never    Drug use: Never      Family History   Problem Relation Age of Onset    No Known Problems Mother     No Known Problems Father     No Known Problems Sister     No Known Problems Brother       Allergies   Allergen Reactions    Keflex [Cephalexin] Mental Status Change and Provider Review Needed    Morphine Mental Status Change    Premarin [Conjugated Estrogens] Mental Status Change      Social Determinants of Health     Tobacco Use: High Risk (5/7/2024)    Patient History     Smoking Tobacco Use: Every Day     Smokeless Tobacco Use: Never     Passive Exposure: Not on file   Alcohol Use: Not At Risk (5/7/2024)    AUDIT-C     Frequency of Alcohol Consumption: Never     Average Number of Drinks: Patient does not drink     Frequency of Binge Drinking:  Never   Financial Resource Strain: Not on file   Food Insecurity: Not on file   Transportation Needs: Not on file   Physical Activity: Not on file   Stress: Not on file   Social Connections: Unknown (5/6/2024)    Family and Community Support     Help with Day-to-Day Activities: Not on file     Lonely or Isolated: Not on file   Interpersonal Safety: Not At Risk (5/7/2024)    Abuse Screen     Unsafe at Home or Work/School: no     Feels Threatened by Someone?: no     Does Anyone Keep You from Contacting Others or Doint Things Outside the Home?: no     Physical Sign of Abuse Present: no   Depression: Not on file   Housing Stability: Unknown (5/7/2024)    Housing Stability     Current Living Arrangements: home     Potentially Unsafe Housing Conditions: Not on file   Utilities: Not on file   Health Literacy: Unknown (5/6/2024)    Education     Help with school or training?: Not on file     Preferred Language: Not on file   Employment: Unknown (5/6/2024)    Employment     Do you want help finding or keeping work or a job?: Not on file   Disabilities: Not At Risk (5/7/2024)    Disabilities     Concentrating, Remembering, or Making Decisions Difficulty: no     Doing Errands Independently Difficulty: no        Home Medications     Prior to Admission medications    Medication Sig Start Date End Date Taking? Authorizing Provider   ALPRAZolam (XANAX) 1 MG tablet Take 1 tablet by mouth 3 (Three) Times a Day As Needed for Anxiety.   Yes Memo Powers MD   amLODIPine (NORVASC) 5 MG tablet Take 1 tablet by mouth Daily.   Yes Memo Powers MD   FLUoxetine (PROzac) 10 MG capsule Take 1 capsule by mouth Daily.   Yes Memo Powers MD   FLUoxetine (PROzac) 20 MG capsule Take 1 capsule by mouth Daily.   Yes Memo Powers MD        Objective / Physical Exam     Vital signs:  Temp: 97.9 °F (36.6 °C)  BP: 119/72  Heart Rate: 80  Resp: 15  SpO2: 93 %  Weight: 97.9 kg (215 lb 13.3 oz)    Admission Weight:  Weight: 97.9 kg (215 lb 13.3 oz)    Physical Exam  Constitutional:       Appearance: She is obese.   Eyes:      Pupils: Pupils are equal, round, and reactive to light.   Cardiovascular:      Rate and Rhythm: Normal rate and regular rhythm.   Pulmonary:      Breath sounds: Rhonchi present.   Abdominal:      General: There is no distension.      Palpations: Abdomen is soft.   Musculoskeletal:      Right lower leg: Edema present.      Left lower leg: Edema present.   Skin:     General: Skin is warm and dry.   Neurological:      Mental Status: She is alert and oriented to person, place, and time.   Psychiatric:         Mood and Affect: Mood normal.         Behavior: Behavior normal.            Labs     5/6/2024 white blood cell 9.2, hemoglobin 19.4, hematocrit 59.4, platelet 124, sodium 139, potassium 3.4, chloride 98, carbon dioxide 34, anion gap of 7, BUN 15, creatinine 0.65, glucose 113, ALT 15, AST 11, bilirubin 2, albumin 3.2, high-sensitivity troponin 34, lactate 1.3    Imaging     See above assessment and plan.  Copy of CT PE protocol in chart    Current Medications     Scheduled Meds:  amLODIPine, 5 mg, Oral, Daily  budesonide-formoterol, 2 puff, Inhalation, BID - RT  famotidine, 40 mg, Oral, Daily  FLUoxetine, 10 mg, Oral, Daily  FLUoxetine, 20 mg, Oral, Daily  furosemide, 40 mg, Intravenous, Q12H  nicotine, 1 patch, Transdermal, Q24H  potassium chloride, 20 mEq, Oral, BID  sodium chloride, 10 mL, Intravenous, Q12H         Continuous Infusions:          [facility]  05/07/24   04:13 EDT      Electronically signed by Tiarra Renee MD at 05/07/24 0548          Respiratory Therapy Notes (last 24 hours)        Dixie Oropeza, RRT at 05/14/24 1157        Procedure Orders    1. Walking Oximetry [[id number]] ordered by Kirk Flores MD at 05/14/24 0832                 Exercise Oximetry    Patient Name:Jeanne King   MRN: [id number]   Date: 05/14/24             ROOM AIR  BASELINE   SpO2% 88   Heart Rate 56   Blood Pressure      EXERCISE ON ROOM AIR SpO2% EXERCISE ON O2 @ 4 LPM SpO2%   1 MINUTE  1 MINUTE          on 2L 87%   2 MINUTES  2 MINUTES        on 3L 88%   3 MINUTES  3 MINUTES        on 3L  86%   4 MINUTES  4 MINUTES        on 4L  90%   5 MINUTES  5 MINUTES        on 4L  91%   6 MINUTES  6 MINUTES        on 4L 92%              Distance Walked   Distance Walked   Dyspnea (Eden Scale)   Dyspnea (Eden Scale)   Fatigue (Eden Scale)   Fatigue (Eden Scale)   SpO2% Post Exercise  91-92% SpO2% Post Exercise   HR Post Exercise  72    Time to Recovery   Time to Recovery     Comments: Pt required 4L of oxygen with ambulation to maintain SpO2 of 91-92%.     Electronically signed by Dixie Oropeza, KAYKAY at 05/14/24 1201         Oxygen Therapy [EQ60] (Order [id number])  Order  Date: 5/14/2024 Department: [facility] CARE Ordering/Authorizing: Kirk Flores MD     Order History  Outpatient  Date/Time Action Taken User Additional Information   05/14/24 1247 Sign Kirk Flores MD      Order Details    Frequency Duration Priority Order Class   None None Routine External     Start Date/Time    Start Date   05/14/24     Order Information    Order Date Service Start Date Start Time   05/14/24 Medicine 05/14/24      Reference Links    Associated Reports   View Encounter     Order Questions    Question Answer   Delivery Modality Nasal Cannula   Liters Per Minute: 4   Duration: Continuous   Equipment  Oxygen Concentrator &  &  Portable Gaseous Oxygen System & Portable Oxygen Contents Gaseous &  Conserving Regulator   Length of Need 6 Months            Source Order Set / Preference List    Preference List   AMB SUPPLIES [1416]           Clinical Indications     ICD-10-CM ICD-9-CM   Acute hypoxic respiratory failure    J96.01 518.81   Chronic obstructive pulmonary disease with acute exacerbation    J44.1 491.21                             Reprint  Order Requisition    Oxygen Therapy (Order #[id number]) on 5/14/24         Encounter    View Encounter                Order Provider Info        Office phone Pager E-mail   Ordering User  Kirk Flores MD  [phone] -- --   Authorizing Provider  Kirk Flores MD  [phone] -- --   Attending Provider  Kirk Flores MD  [phone] -- --     Tracking Reports    Cosign Tracking Order Transmittal Tracking     Authorized by:  Kirk Flores MD  (NPI: [id number])                Lab Component SmartPhrase Guide    Oxygen Therapy (Order #[id number]) on 5/14/24

## 2024-05-14 NOTE — PROGRESS NOTES
"    [facility] MEDICINE SERVICE  DAILY PROGRESS NOTE    NAME: Jeanne King  : 1960  MRN: [id number]      LOS: 7 days     PROVIDER OF SERVICE: Kirk Flores MD    Chief Complaint: Acute hypoxic respiratory failure    Subjective:        History of Present illness      Jeanne King is a 64 y.o. female with PMH of hypertension, COPD, anxiety presented initially to [facility].  Patient reports she has been feeling unwell for \"a long time\".  She does not use oxygen at home.  Uses albuterol and Advair.  Does not like going to the doctor and very anxious and was not willing to seek medical help until her daughter persuaded her due to acute edema to bilateral lower extremities with signs of weeping.  Patient denies any cough fever chills.  Positive for shortness of breath.  Reports family history of sarcoidosis and Rodriguez cirrhosis     Patient reports feels that she \"was not thinking straight\" when she arrived in the ED.  She remembers getting told that her oxygen was in the 70s and she recalls thinking \"that is not bad\".  Once O2 improved to normal range she realized that she had been slightly delirious.  It is reported that she was initially on 6 L nasal cannula but then escalated to 30 L high flow with FiO2 of 60.  She was given 40 of Lasix with approximately 1 L of output.  I requested patient be placed on BiPAP which she did tolerate shortly .VBG showed pH 7.53, pCO2 35, pO2 101, bicarb 29 and O2 sat 98.  COVID was negative negative for leukocytosis.  Hemoglobin 19.4 and platelet 125.  Other labs reviewed potassium 3.4.  High-sensitivity troponin was 34 lactate 1.3 .  CT of the chest was negative for pulmonary emboli.  Concern for primary lung neoplasm with 2 lung lesions identified with mediastinal and hilar lymphadenopathy.  Cardiomegaly and coronary artery calcifications.  Cirrhotic enlarged liver splenomegaly and abdominal ascites.  She was transferred to [facility].  On arrival " patient sats 91% on 5 L nasal cannula.  She is negative for any respiratory distress.  She has been admitted for further testing     Patient was seen and examined on 05/07/24 at 04:13 EDT .    Interval History:  History taken from: patient  Patient Complaints: Headaches, ABG noted  patient to be on BiPAP overnight   denies: Nausea or vomiting    5/13 patient seen and examined, denies any chest pain shortness of breath, she is requiring 5 L of oxygen and does not use any oxygen at home at all.  5/14/24 patient seen and examined examined in bed no acute distress, doing better today, worked with physical therapy, discussed with RN.  Will order 6-minute walk, patient is to go home on discharge, she lives alone.  Will likely need oxygen.  On p.o. Lasix.     Review of Systems  14 point review of system unremarkable except mentioned above  Objective:     Vital Signs  Temp:  [98.1 °F (36.7 °C)-98.6 °F (37 °C)] 98.1 °F (36.7 °C)  Heart Rate:  [54-75] 57  Resp:  [14-22] 20  BP: (125-148)/(62-77) 148/77  Flow (L/min):  [4-5] 5   Body mass index is 32.78 kg/m².    Physical Exam  HENT:      Head: Atraumatic.      Mouth/Throat:      Mouth: Mucous membranes are moist.   Eyes:      Pupils: Pupils are equal, round, and reactive to light.   Cardiovascular:      Rate and Rhythm: Normal rate and regular rhythm.      Pulses: Normal pulses.      Heart sounds: Normal heart sounds.   Abdominal:      General: Bowel sounds are normal.      Palpations: Abdomen is soft.   Musculoskeletal:      Right lower leg: Edema present.      Left lower leg: Edema present.   Skin:     General: Skin is warm.   Neurological:      General: No focal deficit present.      Mental Status: She is alert and oriented to person, place, and time.   Psychiatric:         Mood and Affect: Mood normal.         Scheduled Meds   acetaZOLAMIDE (DIAMOX) 250 mg in sterile water (preservative free) 2.5 mL injection, 250 mg, Intravenous, Daily  budesonide, 0.5 mg, Nebulization,  BID - RT  FLUoxetine, 10 mg, Oral, Daily  FLUoxetine, 20 mg, Oral, Daily  folic acid, 1 mg, Oral, Daily  furosemide, 40 mg, Oral, Daily  insulin lispro, 2-7 Units, Subcutaneous, 4x Daily AC & at Bedtime  ipratropium-albuterol, 3 mL, Nebulization, 4x Daily - RT  methylPREDNISolone sodium succinate, 40 mg, Intravenous, Q12H  metoprolol succinate XL, 25 mg, Oral, Q12H  miconazole, 1 Application, Topical, Q12H  nicotine, 1 patch, Transdermal, Q24H  potassium chloride, 20 mEq, Oral, BID With Meals  sodium chloride, 10 mL, Intravenous, Q12H       PRN Meds     acetaminophen    ALPRAZolam    senna-docusate sodium **AND** polyethylene glycol **AND** bisacodyl **AND** bisacodyl    dextrose    dextrose    glucagon (human recombinant)    ipratropium-albuterol    nitroglycerin    ondansetron    sodium chloride    sodium chloride   Infusions         Diagnostic Data    Results from last 7 days   Lab Units 05/13/24  1349 05/12/24  0415   WBC 10*3/mm3  --  10.66   HEMOGLOBIN g/dL  --  17.8*   HEMATOCRIT %  --  58.7*   PLATELETS 10*3/mm3  --  102*   GLUCOSE mg/dL 128* 116*   CREATININE mg/dL 0.61 0.64   BUN mg/dL 22 28*   SODIUM mmol/L 138 136   POTASSIUM mmol/L 4.0 4.5   AST (SGOT) U/L 15  --    ALT (SGPT) U/L 51*  --    ALK PHOS U/L 91  --    BILIRUBIN mg/dL 2.7*  --    ANION GAP mmol/L 7.0 6.0       No radiology results for the last day      I reviewed the patient's new clinical results.    Assessment/Plan:     #Acute hypoxemic respiratory failure  #Acute exacerbation of likely diastolic heart failure  #Acute pulmonary edema  # suspicious Malignant lung neoplasm and mediastinal   Lymphadenopathy  #Bilateral lower extremity edema likely from above  -Continue to supplement oxygen to keep saturation above 90 to 92%  - CT of the chest 1. No pulmonary emboli 2. Bilateral rond glass patchy opacities nonspecific suggestion of pulmonary edema.  A right lower lobe 2.3 cm spiculated mass lesion, another more distal lesion 1.7 cm.  These  lesions are highly concerning for primary lung neoplasm and recommend biopsy or PET/CT exam.  Lingular atelectasis.  No pneumothorax or pleural effusion.  Mediastinal and hilar lymphadenopathy.  #3 cardiomegaly and coronary artery calcifications.  #4 cirrhotic enlarged liver.  Splenomegaly.  Abdominal ascites.  - afebrile. Negative for cough or fever. Negative leukocytosis. Does not appear to have pneumonia  -Continue IV Lasix --will be transitioned to p.o. per cardiology recommendations.  - Transthoracic echocardiogram  noted ; out pt ischemic workup   -cardiology team  following  - s/p EBUS; await lab tests results   -oncology team following the pt      Liver cirrhosis  -Ultrasound noted for subtotal nodular surface contour of the liver with mildly coarsened echotexture raising the possibility of cirrhosis and no focal as well as ascites seen  -HCC  Screening     #Erythrocytosis  -Hematocrit 59.9  - Serum EPO level low      Essential hypertension  - Continue to monitor  - Continue Norvasc     #History of anxiety-continue Xanax and Protonix     #COPD  #Suspected SHANKAR-outpatient sleep study  -Inhalers bronchodilators and IV steroids     Obesity BMI 32.82  - Lifestyle and dietary management    DVT prophylaxis:  Mechanical DVT prophylaxis orders are present.    Code status is   Code Status and Medical Interventions:   Ordered at: 05/07/24 0243     Level Of Support Discussed With:    Patient     Code Status (Patient has no pulse and is not breathing):    CPR (Attempt to Resuscitate)     Medical Interventions (Patient has pulse or is breathing):    Full Support       Plan for disposition: home  in 1-2  days, currently on 5 L of oxygen and does not have any oxygen at home.  Time: 35 minutes    Signature: Electronically signed by Kirk Flores MD, 05/14/24, 08:25 EDT.  [facility] Hospitalist Team

## 2024-05-14 NOTE — DISCHARGE SUMMARY
[facility] Medicine Services  Discharge Summary    Date of Service: 24  Patient Name: Jeanne King  : 1960  MRN: [id number]    Date of Admission: 2024  Discharge Diagnosis: #Acute hypoxemic respiratory failure  Date of Discharge:  24  Primary Care Physician: Provider, No Known      Presenting Problem:   Acute hypoxic respiratory failure [J96.01]    Active and Resolved Hospital Problems:  Active Hospital Problems    Diagnosis POA    **Acute hypoxic respiratory failure [J96.01] Yes    Chronic obstructive pulmonary disease with acute exacerbation [J44.1] Unknown    Lung mass [R91.8] Unknown      Resolved Hospital Problems   No resolved problems to display.     #Acute hypoxemic respiratory failure  #Acute exacerbation of likely diastolic heart failure  #Acute pulmonary edema  # suspicious Malignant lung neoplasm and mediastinal   Lymphadenopathy  #Bilateral lower extremity edema likely from above  -Continue to supplement oxygen to keep saturation above 90 to 92%  - CT of the chest 1. No pulmonary emboli 2. Bilateral rond glass patchy opacities nonspecific suggestion of pulmonary edema.  A right lower lobe 2.3 cm spiculated mass lesion, another more distal lesion 1.7 cm.  These lesions are highly concerning for primary lung neoplasm and recommend biopsy or PET/CT exam.  Lingular atelectasis.  No pneumothorax or pleural effusion.  Mediastinal and hilar lymphadenopathy.  #3 cardiomegaly and coronary artery calcifications.  #4 cirrhotic enlarged liver.  Splenomegaly.  Abdominal ascites.  - afebrile. Negative for cough or fever. Negative leukocytosis. Does not appear to have pneumonia  -Continue IV Lasix --will be transitioned to p.o. per cardiology recommendations.  - Transthoracic echocardiogram  noted ; out pt ischemic workup   -cardiology team  following  - s/p EBUS; await lab tests results   -oncology team following the pt      Liver cirrhosis  -Ultrasound noted for subtotal  "nodular surface contour of the liver with mildly coarsened echotexture raising the possibility of cirrhosis and no focal as well as ascites seen  -HCC  Screening     #Erythrocytosis  -Hematocrit 59.9  - Serum EPO level low      Essential hypertension  - Continue to monitor  - Continue Norvasc     #History of anxiety-continue Xanax and Protonix     #COPD  #Suspected SHANKAR-outpatient sleep study  -Inhalers bronchodilators and IV steroids     Obesity BMI 32.82  - Lifestyle and dietary management     Hospital Course     HPI:Jeanne King is a 64 y.o. female with PMH of hypertension, COPD, anxiety presented initially to [facility].  Patient reports she has been feeling unwell for \"a long time\".  She does not use oxygen at home.  Uses albuterol and Advair.  Does not like going to the doctor and very anxious and was not willing to seek medical help until her daughter persuaded her due to acute edema to bilateral lower extremities with signs of weeping.  Patient denies any cough fever chills.  Positive for shortness of breath.  Reports family history of sarcoidosis and Rodriguez cirrhosis     Patient reports feels that she \"was not thinking straight\" when she arrived in the ED.  She remembers getting told that her oxygen was in the 70s and she recalls thinking \"that is not bad\".  Once O2 improved to normal range she realized that she had been slightly delirious.  It is reported that she was initially on 6 L nasal cannula but then escalated to 30 L high flow with FiO2 of 60.  She was given 40 of Lasix with approximately 1 L of output.  I requested patient be placed on BiPAP which she did tolerate shortly .VBG showed pH 7.53, pCO2 35, pO2 101, bicarb 29 and O2 sat 98.  COVID was negative negative for leukocytosis.  Hemoglobin 19.4 and platelet 125.  Other labs reviewed potassium 3.4.  High-sensitivity troponin was 34 lactate 1.3 .  CT of the chest was negative for pulmonary emboli.  Concern for primary lung neoplasm with 2 " lung lesions identified with mediastinal and hilar lymphadenopathy.  Cardiomegaly and coronary artery calcifications.  Cirrhotic enlarged liver splenomegaly and abdominal ascites.  She was transferred to [facility].  On arrival patient sats 91% on 5 L nasal cannula.  She is negative for any respiratory distress.  She has been admitted for further testing     Patient was seen and examined on 05/07/24 at 04:13 EDT .     Interval History:  History taken from: patient  5/12/14-Patient Complaints: Headaches, ABG noted  patient to be on BiPAP overnight,  denies: Nausea or vomiting  5/13 patient seen and examined, denies any chest pain shortness of breath, she is requiring 5 L of oxygen and does not use any oxygen at home at all.  5/14/24 patient seen and examined examined in bed no acute distress, doing better today, worked with physical therapy, discussed with RN.  Will order 6-minute walk, patient is to go home on discharge, she lives alone.  Will need oxygen on dc.  On p.o. Lasix, condition on dc stable.    DISCHARGE Follow Up Recommendations for labs and diagnostics:   Recommend outpt cardiology f/u in 4 weeks    Reasons For Change In Medications and Indications for New Medications:   START taking:  acetaZOLAMIDE (DIAMOX)   budesonide (PULMICORT)   folic acid (FOLVITE)   furosemide (LASIX)   ipratropium-albuterol (DUO-NEB)   metoprolol succinate XL (TOPROL-XL)   nicotine (NICODERM CQ)   predniSONE (DELTASONE)   sennosides-docusate (PERICOLACE)    STOP taking:  amLODIPine 5 MG tablet (NORVASC)   Fluticasone-Salmeterol 250-50 MCG/ACT DISKUS (ADVAIR/WIXELA)     Day of Discharge     Vital Signs:  Temp:  [98.1 °F (36.7 °C)-98.6 °F (37 °C)] 98.4 °F (36.9 °C)  Heart Rate:  [55-75] 56  Resp:  [12-22] 16  BP: (125-148)/(62-77) 134/70  Flow (L/min):  [3-5] 3    Physical Exam      Head: Atraumatic.      Mouth/Throat:      Mouth: Mucous membranes are moist.   Eyes:      Pupils: Pupils are equal, round, and reactive to  light.   Cardiovascular:      Rate and Rhythm: Normal rate and regular rhythm.      Pulses: Normal pulses.      Heart sounds: Normal heart sounds.   Abdominal:      General: Bowel sounds are normal.      Palpations: Abdomen is soft.   Musculoskeletal:      Right lower leg: Edema present.      Left lower leg: Edema present.   Skin:     General: Skin is warm.   Neurological:      General: No focal deficit present.      Mental Status: She is alert and oriented to person, place, and time.   Psychiatric:         Mood and Affect: Mood normal.     Pertinent  and/or Most Recent Results     LAB RESULTS:      Lab 05/12/24  0415 05/11/24  0718 05/11/24  0414 05/11/24  0300 05/10/24  0551 05/09/24  0308   WBC 10.66  --  8.42  --  10.46 9.84   HEMOGLOBIN 17.8*  --  17.5*  --  18.1* 18.2*   HEMOGLOBIN, POC  --   --   --  20.6*  --   --    HEMATOCRIT 58.7*  --  58.4*  --  59.8* 61.9*   HEMATOCRIT POC  --   --   --  61*  --   --    PLATELETS 102*  --  105*  --  124* 142   NEUTROS ABS 8.87*  --   --   --  8.80* 8.63*   IMMATURE GRANS (ABS) 0.08*  --   --   --  0.05 0.06*   LYMPHS ABS 0.97  --   --   --  0.99 0.68*   MONOS ABS 0.72  --   --   --  0.61 0.45   EOS ABS 0.00  --   --   --  0.00 0.00   MCV 96.1  --  97.8*  --  96.3 97.5*   LACTATE  --  1.7 2.3* 2.2*  --   --          Lab 05/13/24  1349 05/12/24  0415 05/11/24  0414 05/11/24  0300 05/10/24  0551 05/09/24  0308 05/08/24  0618   SODIUM 138 136 136  --  137 142 142   POTASSIUM 4.0 4.5 4.1  --  5.0 4.6 4.4   CHLORIDE 93* 90* 92*  --  94* 98 98   CO2 38.0* 40.0* 40.0*  --  36.0* 38.0* 36.0*   ANION GAP 7.0 6.0 4.0*  --  7.0 6.0 8.0   BUN 22 28* 28*  --  27* 23 17   CREATININE 0.61 0.64 0.71 0.73 0.71 0.71 0.70   EGFR 99.4 98.2 94.5 91.4 94.5 94.5 96.1   GLUCOSE 128* 116* 235*  --  123* 154* 148*   CALCIUM 9.6 9.5 9.3  --  9.2 8.8 8.5*   MAGNESIUM  --   --   --   --  1.9 1.9 1.7   PHOSPHORUS  --   --   --   --  3.5  --  5.2*         Lab 05/13/24  1349 05/10/24  0551   TOTAL  PROTEIN 6.4 6.4   ALBUMIN 3.7 3.4*   GLOBULIN 2.7 3.0   ALT (SGPT) 51* 45*   AST (SGOT) 15 36*   BILIRUBIN 2.7* 0.9   ALK PHOS 91 92         Lab 05/08/24  1016 05/08/24  0618   HSTROP T 22* 19*             Lab 05/07/24  1812   FOLATE 4.27*   VITAMIN B 12 625         Lab 05/12/24  0857 05/11/24  0300   PH, ARTERIAL 7.384 7.290*   PCO2, ARTERIAL 71.7* 87.6*   PO2 ART 79.7* 62.5*   O2 SATURATION ART 94.7 86.7*   FIO2 50 32   HCO3 ART 42.8* 42.1*   BASE EXCESS ART 12.2* 9.4*     Brief Urine Lab Results  (Last result in the past 365 days)        Color   Clarity   Blood   Leuk Est   Nitrite   Protein   CREAT   Urine HCG        05/13/24 1545 Yellow   Hazy   Negative   Trace   Negative   Negative                 Microbiology Results (last 10 days)       Procedure Component Value - Date/Time    Virus Culture - Lavage, Lung, Right Upper Lobe [[id number]] Collected: 05/10/24 0813    Lab Status: Preliminary result Specimen: Lavage from Lung, Right Upper Lobe Updated: 05/14/24 0208     Viral Culture, General Comment     Comment: Preliminary Report:  No virus isolated at 48 hours. Next report to follow after 4 days.       Narrative:      Performed at:  [address] [place] [address]  [id number]  : Doroteo Ballard MD, Phone:  [phone]    AFB Culture - Lavage, Lung, Right Upper Lobe [[id number]] Collected: 05/10/24 0813    Lab Status: Preliminary result Specimen: Lavage from Lung, Right Upper Lobe Updated: 05/11/24 1112     AFB Stain No acid fast bacilli seen on concentrated smear    BAL Culture, Quantitative - Lavage, Lung, Right Upper Lobe [[id number]] Collected: 05/10/24 0813    Lab Status: Final result Specimen: Lavage from Lung, Right Upper Lobe Updated: 05/12/24 1143     BAL Culture <10,000 CFU/mL Normal respiratory estela. No S. aureus or Pseudomonas aeruginosa detected. Final report.     Gram Stain Moderate (3+) WBCs per low power field      Rare (1+) Epithelial cells per low power field       No organisms seen    Respiratory Panel PCR w/COVID-19(SARS-CoV-2) [facility]HELBY/MADI/PAD/COR/ROSALES In-House, NP Swab in UTM/VTM, 2 HR TAT - Lavage, Lung, Right Upper Lobe [[id number]]  (Normal) Collected: 05/10/24 0813    Lab Status: Final result Specimen: Lavage from Lung, Right Upper Lobe Updated: 05/10/24 1132     ADENOVIRUS, PCR Not Detected     Coronavirus 229E Not Detected     Coronavirus HKU1 Not Detected     Coronavirus NL63 Not Detected     Coronavirus OC43 Not Detected     COVID19 Not Detected     Human Metapneumovirus Not Detected     Human Rhinovirus/Enterovirus Not Detected     Influenza A PCR Not Detected     Influenza B PCR Not Detected     Parainfluenza Virus 1 Not Detected     Parainfluenza Virus 2 Not Detected     Parainfluenza Virus 3 Not Detected     Parainfluenza Virus 4 Not Detected     RSV, PCR Not Detected     Bordetella pertussis pcr Not Detected     Bordetella parapertussis PCR Not Detected     Chlamydophila pneumoniae PCR Not Detected     Mycoplasma pneumo by PCR Not Detected    Narrative:      In the setting of a positive respiratory panel with a viral infection PLUS a negative procalcitonin without other underlying concern for bacterial infection, consider observing off antibiotics or discontinuation of antibiotics and continue supportive care. If the respiratory panel is positive for atypical bacterial infection (Bordetella pertussis, Chlamydophila pneumoniae, or Mycoplasma pneumoniae), consider antibiotic de-escalation to target atypical bacterial infection.            XR Chest 1 View    Result Date: 5/11/2024  Impression: Impression: 1. Stable cardiomegaly. 2. Slight improvement in bibasilar airspace disease. Electronically Signed: Noble Hernandez MD  5/11/2024 11:22 AM EDT  Workstation ID: [id number]    XR Chest 1 View    Result Date: 5/10/2024  Impression: Impression: 1.Cardiomegaly with pulmonary vascular congestion and scattered bibasilar atelectasis. Electronically Signed: Frankie  MD Huyen  5/10/2024 9:22 AM EDT  Workstation ID: [id number]    US Liver    Result Date: 5/7/2024  Impression: 1. Hepatomegaly. 2. Subtle nodular surface contour of the liver with mildly coarsened echotexture, raising the possibility of cirrhosis. 3. No focal liver lesion is seen. 4. No ascites is seen.  Electronically Signed By-Jane Woodruff MD On:5/7/2024 7:33 AM               Results for orders placed during the hospital encounter of 05/07/24    Adult Transthoracic Echo Complete W/ Cont if Necessary Per Protocol    Interpretation Summary    Left ventricular systolic function is normal. Left ventricular ejection fraction appears to be 56 - 60%.    Left ventricular diastolic function is consistent with (grade I) impaired relaxation.    Mildly reduced right ventricular systolic function noted.    The right ventricular cavity is moderately dilated.    Estimated right ventricular systolic pressure from tricuspid regurgitation is normal (<35 mmHg).    IVC is dilated      Labs Pending at Discharge:  Pending Labs       Order Current Status    Fungus Culture - Lavage, Lung, Right Upper Lobe In process    Non-gynecologic Cytology In process    Pneumocystis PCR - Lavage, Lung, Right Upper Lobe In process    AFB Culture - Lavage, Lung, Right Upper Lobe Preliminary result    Fine Needle Aspiration Preliminary result    Virus Culture - Lavage, Lung, Right Upper Lobe Preliminary result            Procedures Performed  Procedure(s):  BRONCHOSCOPY WITH BRONCHOALVEOLAR LAVAGE AND ENDOBRONCHIAL ULTRASOUND WITH FINE NEEDLE ASPIRATION x2  05/14 1157 Walking Oximetry      Consults:   Consults       Date and Time Order Name Status Description    5/9/2024  8:49 AM Inpatient Cardiology Consult      5/7/2024 12:57 PM Inpatient Cardiology Consult Completed     5/7/2024  7:44 AM Hematology & Oncology Inpatient Consult Completed     5/7/2024  2:45 AM Inpatient Pulmonology Consult Completed           Assessment     Acute hypoxic/hypercapnic  respiratory failure   AE COPD  Hx COVID-19   RLL spiculated mass lesion    HTN  Tobacco abuse  Obesity  Anxiety     PLAN:   Oxygen supplement and titration to maintain saturation 90-95%: Currently on 3-5 L per nasal cannula, BiPAP at bedtime and as needed      Patient can be discharged home from pulmonary standpoint once her oxygen concentrator is set up at home, follow-up in the pulmonary clinic in 2 weeks        S/p EBUS 5/10/2024 with preliminary results neg for cancer will await final culture  Patient will need PET scan as an outpatient and if positive to schedule robotic/ion bronchoscopy  Encourage OOB during day   Bronchodilators  Inhaled corticosteroids  IV steroids: Switch to tapering dose prednisone  Incentive spirometer  Smoking cessation counseling, nicotine patch  Diuresis  Electrolytes/ glycemic control     I personally reviewed the radiological images         Assessment:        Acute hypoxic respiratory failure    Lung mass     Acute hypoxic and hypercapnic respiratory insufficiency  Volume overload / HFpEF / RV dysfunction  HTN  Tobacco abuse  Abnormal CT at OSH with Concern for primary lung neoplasm with 2 lung lesions identified with mediastinal and hilar lymphadenopathy and cirrhotic changes of liver  Polycythemia  Intermittent confusion        Plan:   Patient admitted with shortness of breath, lower extremity edema, long term smoker, has not seen physician in a number of years, CT at [facility] with concern for lung neoplasm and hilar lymphadenopathy  Volume status improving  On oral diuresis  Stress test as outpt once improves from lung standpoint / on less O2  Needs SHANKAR work up as outpt  6 min. Walk  Planned for discharge today     Recommend outpt cardiology f/u in 4 weeks       Discharge Details        Discharge Medications        New Medications        Instructions Start Date   acetaZOLAMIDE 250 MG tablet  Commonly known as: DIAMOX   250 mg, Oral, Daily   Start Date: May 15, 2024     budesonide  0.5 MG/2ML nebulizer solution  Commonly known as: PULMICORT   0.5 mg, Nebulization, 2 Times Daily - RT      folic acid 1 MG tablet  Commonly known as: FOLVITE   1 mg, Oral, Daily   Start Date: May 15, 2024     furosemide 40 MG tablet  Commonly known as: LASIX   40 mg, Oral, Daily   Start Date: May 15, 2024     ipratropium-albuterol 0.5-2.5 mg/3 ml nebulizer  Commonly known as: DUO-NEB   3 mL, Nebulization, Every 4 Hours PRN, Copd j44.9      metoprolol succinate XL 25 MG 24 hr tablet  Commonly known as: TOPROL-XL   25 mg, Oral, Every 12 Hours Scheduled      nicotine 21 MG/24HR patch  Commonly known as: NICODERM CQ   1 patch, Transdermal, Every 24 Hours Scheduled   Start Date: May 15, 2024     predniSONE 10 MG (48) dose pack  Commonly known as: DELTASONE   40 mg po qd x 3 days 30 mg po qd x 3 days 20 mg po qd x 3 days 10 mg po qd x 3 days      sennosides-docusate 8.6-50 MG per tablet  Commonly known as: PERICOLACE   2 tablets, Oral, 2 Times Daily PRN             Continue These Medications        Instructions Start Date   albuterol sulfate  (90 Base) MCG/ACT inhaler  Commonly known as: PROVENTIL HFA;VENTOLIN HFA;PROAIR HFA   2 puffs, Inhalation, Every 4 Hours PRN      ALPRAZolam 1 MG tablet  Commonly known as: XANAX   1 mg, Oral, 3 Times Daily PRN      FLUoxetine 20 MG capsule  Commonly known as: PROzac   20 mg, Oral, Daily      FLUoxetine 10 MG capsule  Commonly known as: PROzac   10 mg, Oral, Daily             Stop These Medications      amLODIPine 5 MG tablet  Commonly known as: NORVASC     Fluticasone-Salmeterol 250-50 MCG/ACT DISKUS  Commonly known as: ADVAIR/WIXELA              Allergies   Allergen Reactions    Keflex [Cephalexin] Mental Status Change and Provider Review Needed    Morphine Mental Status Change    Premarin [Conjugated Estrogens] Mental Status Change         Discharge Disposition:   Home or Self Care    Diet:  Hospital:  Diet Order   Procedures    Diet: Cardiac, Diabetic; Healthy Heart (2-3  Na+); Consistent Carbohydrate; Fluid Consistency: Thin (IDDSI 0)         Discharge Activity:   Activity Instructions       Gradually Increase Activity Until at Pre-Hospitalization Level                CODE STATUS:  Code Status and Medical Interventions:   Ordered at: 05/07/24 0243     Level Of Support Discussed With:    Patient     Code Status (Patient has no pulse and is not breathing):    CPR (Attempt to Resuscitate)     Medical Interventions (Patient has pulse or is breathing):    Full Support         No future appointments.    Additional Instructions for the Follow-ups that You Need to Schedule       Discharge Follow-up with PCP   As directed       Currently Documented PCP:    Provider, No Known    PCP Phone Number:    None     Follow Up Details: 1 week                Time spent on Discharge including face to face service:  >30 minutes    Signature: Electronically signed by Kirk Flores MD, 05/14/24, 12:51 EDT.  [facility] Hospitalist Team

## 2024-05-15 LAB
BEAKER LAB AP INTRAOPERATIVE CONSULTATION: NORMAL
LAB AP CASE REPORT: NORMAL
LAB AP CASE REPORT: NORMAL
LAB AP DIAGNOSIS COMMENT: NORMAL
PATH REPORT.FINAL DX SPEC: NORMAL
PATH REPORT.FINAL DX SPEC: NORMAL
PATH REPORT.GROSS SPEC: NORMAL
PATH REPORT.GROSS SPEC: NORMAL

## 2024-05-15 NOTE — OUTREACH NOTE
Prep Survey      Flowsheet Row Responses   Hinduism facility patient discharged from? [facility]   Is LACE score < 7 ? No   Eligibility Readm Mgmt   Discharge diagnosis Acute hypoxic resp failure-Bronchoscopy with fine needle aspiration   Does the patient have one of the following disease processes/diagnoses(primary or secondary)? General Surgery   Does the patient have Home health ordered? Yes   What is the Home health agency?  [facility]    Is there a DME ordered? Yes   What DME was ordered? [employer] for O2 and nebulizer   Prep survey completed? Yes            AUGUSTINE HERNANDEZ - Registered Nurse

## 2024-05-16 ENCOUNTER — PATIENT OUTREACH (OUTPATIENT)
Dept: ONCOLOGY | Facility: CLINIC | Age: 64
End: 2024-05-16
Payer: MEDICARE

## 2024-05-16 DIAGNOSIS — R91.8 LUNG MASS: Primary | ICD-10-CM

## 2024-05-16 DIAGNOSIS — Z72.0 TOBACCO USE: ICD-10-CM

## 2024-05-16 NOTE — SIGNIFICANT NOTE
Patient called back they are aware of PET appt and seeing Dr. Stewart on 5/29. They have my direct number for any questions or concerns.

## 2024-05-16 NOTE — SIGNIFICANT NOTE
Patient discharged from hospital yesterday. PET scan ordered and scheduled for 5/22 at cancer center, needs to arrive 15 mins early. Nothing but plain water 6 hours before. Left VM to call me back about appt.

## 2024-05-17 LAB
FUNGUS WND CULT: NORMAL
MYCOBACTERIUM SPEC CULT: NORMAL
NIGHT BLUE STAIN TISS: NORMAL

## 2024-05-20 ENCOUNTER — TELEPHONE (OUTPATIENT)
Dept: ONCOLOGY | Facility: CLINIC | Age: 64
End: 2024-05-20

## 2024-05-20 ENCOUNTER — READMISSION MANAGEMENT (OUTPATIENT)
Dept: CALL CENTER | Facility: HOSPITAL | Age: 64
End: 2024-05-20
Payer: MEDICARE

## 2024-05-20 LAB — VIRUS SPEC CULT: NORMAL

## 2024-05-20 NOTE — OUTREACH NOTE
General Surgery Week 1 Survey      Flowsheet Row Responses   Muslim facility patient discharged from? [facility]   Does the patient have one of the following disease processes/diagnoses(primary or secondary)? General Surgery   Week 1 attempt successful? No   Unsuccessful attempts Attempt 1            Sarah Campos Registered Nurse

## 2024-05-20 NOTE — TELEPHONE ENCOUNTER
Caller: Jeanne King    Relationship: Self    Best call back number: [phone]    What is the best time to reach you: ANYTIME    Who are you requesting to speak with (clinical staff, provider,  specific staff member): CINDY        What was the call regarding: TO RETURN CALL TO CINDY  GOT MESSAGE TO CALL BACK .        Is it okay if the provider responds through [employer]: YES

## 2024-05-22 ENCOUNTER — HOSPITAL ENCOUNTER (OUTPATIENT)
Dept: PET IMAGING | Facility: HOSPITAL | Age: 64
Discharge: HOME | End: 2024-05-22
Payer: MEDICARE

## 2024-05-23 ENCOUNTER — READMISSION MANAGEMENT (OUTPATIENT)
Dept: CALL CENTER | Facility: HOSPITAL | Age: 64
End: 2024-05-23
Payer: MEDICARE

## 2024-05-23 NOTE — OUTREACH NOTE
General Surgery Week 1 Survey      Flowsheet Row Responses   [facility] patient discharged from? [facility]   Does the patient have one of the following disease processes/diagnoses(primary or secondary)? General Surgery   Week 1 attempt successful? Yes   Call start time 1325   Call end time 1334   Discharge diagnosis Acute hypoxic resp failure-Bronchoscopy with fine needle aspiration   Is patient permission given to speak with other caregiver? Yes   List who call center can speak with Alexandra daughter   Person spoke with today (if not patient) and relationship Alexandra daughter   Meds reviewed with patient/caregiver? Yes   Is the patient having any side effects they believe may be caused by any medication additions or changes? No   Does the patient have all medications related to this admission filled (includes all antibiotics, pain medications, etc.) Yes   Is the patient taking all medications as directed (includes completed medication regime)? Yes   Does the patient have a follow up appointment scheduled with their surgeon? Yes   Has the patient kept scheduled appointments due by today? Yes  [Next week]   Comments Hema/Onc apt 5/28/24   What DME was ordered? [employer] for O2 and nebulizer   Has all DME been delivered? Yes   DME comments Pt is wearing 2-2.5L O2 (pulse ox reads 97%)   Psychosocial issues? No   Did the patient receive a copy of their discharge instructions? Yes   Nursing interventions Reviewed instructions with patient   What is the patient's perception of their health status since discharge? Improving   Is the patient/caregiver able to teach back steps to recovery at home? Set small, achievable goals for return to baseline health, Rest and rebuild strength, gradually increase activity, Practice good oral hygiene, Eat a well-balance diet, Make a list of questions for surgeon's appointment   If the patient is a current smoker, are they able to teach back resources for cessation? Smoking  cessation medications   Is the patient/caregiver able to teach back the hierarchy of who to call/visit for symptoms/problems? PCP, Specialist, Home health nurse, Urgent Care, ED, 911 Yes   Week 1 call completed? Yes   Graduated Yes   Graduated/Revoked comments Doing well per daughter   Call end time 1334            Myrna H - Registered Nurse

## 2024-05-24 LAB
FUNGUS WND CULT: NORMAL
MYCOBACTERIUM SPEC CULT: NORMAL
NIGHT BLUE STAIN TISS: NORMAL

## 2024-05-30 ENCOUNTER — PATIENT OUTREACH (OUTPATIENT)
Dept: ONCOLOGY | Facility: CLINIC | Age: 64
End: 2024-05-30
Payer: MEDICARE

## 2024-05-31 LAB
FUNGUS WND CULT: NORMAL
MYCOBACTERIUM SPEC CULT: NORMAL
NIGHT BLUE STAIN TISS: NORMAL

## 2024-06-03 ENCOUNTER — PATIENT OUTREACH (OUTPATIENT)
Dept: ONCOLOGY | Facility: CLINIC | Age: 64
End: 2024-06-03
Payer: MEDICARE

## 2024-06-05 ENCOUNTER — PATIENT OUTREACH (OUTPATIENT)
Dept: ONCOLOGY | Facility: CLINIC | Age: 64
End: 2024-06-05
Payer: MEDICARE

## 2024-06-06 ENCOUNTER — HOSPITAL ENCOUNTER (OUTPATIENT)
Dept: PET IMAGING | Facility: HOSPITAL | Age: 64
Discharge: HOME | End: 2024-06-06
Payer: MEDICARE

## 2024-06-06 ENCOUNTER — TELEPHONE (OUTPATIENT)
Dept: ONCOLOGY | Facility: CLINIC | Age: 64
End: 2024-06-06
Payer: MEDICARE

## 2024-06-06 DIAGNOSIS — R91.8 LUNG MASS: ICD-10-CM

## 2024-06-06 DIAGNOSIS — Z72.0 TOBACCO USE: ICD-10-CM

## 2024-06-06 LAB
FUNGUS WND CULT: NORMAL
GLUCOSE BLDC GLUCOMTR-MCNC: 112 MG/DL (ref 70–105)

## 2024-06-06 PROCEDURE — A9552 F18 FDG: HCPCS | Performed by: PHYSICIAN ASSISTANT

## 2024-06-06 PROCEDURE — 71250 CT THORAX DX C-: CPT

## 2024-06-06 PROCEDURE — 82948 REAGENT STRIP/BLOOD GLUCOSE: CPT

## 2024-06-06 PROCEDURE — 78815 PET IMAGE W/CT SKULL-THIGH: CPT

## 2024-06-06 PROCEDURE — 0 FLUDEOXYGLUCOSE F18 SOLUTION: Performed by: PHYSICIAN ASSISTANT

## 2024-06-06 RX ADMIN — FLUDEOXYGLUCOSE F 18 1 DOSE: 200 INJECTION, SOLUTION INTRAVENOUS at 09:25

## 2024-06-06 NOTE — SIGNIFICANT NOTE
Patient called and wanted to get rescheduled for PET and CT ion protocol. She cancelled as she wasn't ready and a little denial per daughter. Asked about MD appts to rescheduled and at this time they want to wait. Will check with her after scans for Dr. Castillo appt.

## 2024-06-07 LAB
MYCOBACTERIUM SPEC CULT: NORMAL
NIGHT BLUE STAIN TISS: NORMAL

## 2024-06-10 ENCOUNTER — TELEPHONE (OUTPATIENT)
Dept: PULMONOLOGY | Facility: HOSPITAL | Age: 64
End: 2024-06-10
Payer: MEDICARE

## 2024-06-11 ENCOUNTER — LAB (OUTPATIENT)
Dept: LAB | Facility: HOSPITAL | Age: 64
End: 2024-06-11
Payer: MEDICARE

## 2024-06-11 ENCOUNTER — OFFICE VISIT (OUTPATIENT)
Dept: ONCOLOGY | Facility: CLINIC | Age: 64
End: 2024-06-11
Payer: MEDICARE

## 2024-06-11 VITALS
DIASTOLIC BLOOD PRESSURE: 71 MMHG | HEIGHT: 68 IN | SYSTOLIC BLOOD PRESSURE: 116 MMHG | WEIGHT: 213 LBS | BODY MASS INDEX: 32.28 KG/M2 | RESPIRATION RATE: 18 BRPM | TEMPERATURE: 98 F | HEART RATE: 67 BPM | OXYGEN SATURATION: 94 %

## 2024-06-11 DIAGNOSIS — R91.8 LUNG MASS: Primary | ICD-10-CM

## 2024-06-11 DIAGNOSIS — D75.89 OTHER SPECIFIED DISEASES OF BLOOD AND BLOOD-FORMING ORGANS: ICD-10-CM

## 2024-06-11 DIAGNOSIS — R71.8 OTHER ABNORMALITY OF RED BLOOD CELLS: ICD-10-CM
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
## 2024-06-11 PROBLEM — S61.452A CAT BITE OF LEFT HAND: Status: ACTIVE | Noted: 2019-02-22

## 2024-06-11 PROBLEM — F17.200 CURRENT SMOKER: Status: ACTIVE | Noted: 2024-06-11

## 2024-06-11 PROBLEM — F32.A DEPRESSIVE DISORDER: Status: ACTIVE | Noted: 2024-06-11

## 2024-06-11 PROBLEM — F31.81 BIPOLAR II DISORDER: Status: ACTIVE | Noted: 2024-06-11

## 2024-06-11 PROBLEM — J32.9 CHRONIC SINUSITIS: Status: ACTIVE | Noted: 2021-08-27

## 2024-06-11 PROBLEM — R09.02 HYPOXEMIA: Status: ACTIVE | Noted: 2024-05-06

## 2024-06-11 PROBLEM — J81.1 PULMONARY EDEMA: Status: ACTIVE | Noted: 2024-05-06

## 2024-06-11 PROBLEM — J40 BRONCHITIS: Status: ACTIVE | Noted: 2021-08-27

## 2024-06-11 LAB
BASOPHILS # BLD AUTO: 0.04 10*3/MM3 (ref 0–0.2)
BASOPHILS NFR BLD AUTO: 0.5 % (ref 0–1.5)
DEPRECATED RDW RBC AUTO: 52.4 FL (ref 37–54)
EOSINOPHIL # BLD AUTO: 0.15 10*3/MM3 (ref 0–0.4)
EOSINOPHIL NFR BLD AUTO: 1.8 % (ref 0.3–6.2)
ERYTHROCYTE [DISTWIDTH] IN BLOOD BY AUTOMATED COUNT: 16.2 % (ref 12.3–15.4)
FERRITIN SERPL-MCNC: 652 NG/ML (ref 13–150)
HCT VFR BLD AUTO: 53.5 % (ref 34–46.6)
HGB BLD-MCNC: 16.5 G/DL (ref 12–15.9)
HOLD SPECIMEN: NORMAL
HOLD SPECIMEN: NORMAL
IRON 24H UR-MRATE: 250 MCG/DL (ref 37–145)
IRON SATN MFR SERPL: 84 % (ref 20–50)
LYMPHOCYTES # BLD AUTO: 2.46 10*3/MM3 (ref 0.7–3.1)
LYMPHOCYTES NFR BLD AUTO: 29.4 % (ref 19.6–45.3)
MCH RBC QN AUTO: 28.3 PG (ref 26.6–33)
MCHC RBC AUTO-ENTMCNC: 30.8 G/DL (ref 31.5–35.7)
MCV RBC AUTO: 91.6 FL (ref 79–97)
MONOCYTES # BLD AUTO: 0.6 10*3/MM3 (ref 0.1–0.9)
MONOCYTES NFR BLD AUTO: 7.2 % (ref 5–12)
NEUTROPHILS NFR BLD AUTO: 5.12 10*3/MM3 (ref 1.7–7)
NEUTROPHILS NFR BLD AUTO: 61.1 % (ref 42.7–76)
PLATELET # BLD AUTO: 115 10*3/MM3 (ref 140–450)
PMV BLD AUTO: 10.7 FL (ref 6–12)
RBC # BLD AUTO: 5.84 10*6/MM3 (ref 3.77–5.28)
TIBC SERPL-MCNC: 297 MCG/DL (ref 298–536)
TRANSFERRIN SERPL-MCNC: 199 MG/DL (ref 200–360)
WBC NRBC COR # BLD AUTO: 8.37 10*3/MM3 (ref 3.4–10.8)

## 2024-06-11 PROCEDURE — 83540 ASSAY OF IRON: CPT | Performed by: INTERNAL MEDICINE

## 2024-06-11 PROCEDURE — 36415 COLL VENOUS BLD VENIPUNCTURE: CPT

## 2024-06-11 PROCEDURE — 1126F AMNT PAIN NOTED NONE PRSNT: CPT | Performed by: INTERNAL MEDICINE

## 2024-06-11 PROCEDURE — 84466 ASSAY OF TRANSFERRIN: CPT | Performed by: INTERNAL MEDICINE

## 2024-06-11 PROCEDURE — 99215 OFFICE O/P EST HI 40 MIN: CPT | Performed by: INTERNAL MEDICINE

## 2024-06-11 PROCEDURE — 85025 COMPLETE CBC W/AUTO DIFF WBC: CPT

## 2024-06-11 PROCEDURE — 82728 ASSAY OF FERRITIN: CPT | Performed by: INTERNAL MEDICINE

## 2024-06-11 RX ORDER — BACLOFEN 20 MG
1 TABLET ORAL DAILY
COMMUNITY
Start: 2024-05-30

## 2024-06-11 RX ORDER — FLUTICASONE PROPIONATE AND SALMETEROL 250; 50 UG/1; UG/1
1 POWDER RESPIRATORY (INHALATION) 2 TIMES DAILY
COMMUNITY
Start: 2024-05-28

## 2024-06-11 RX ORDER — ASPIRIN 81 MG/1
81 TABLET ORAL
COMMUNITY
Start: 2024-05-06

## 2024-06-11 RX ORDER — AMLODIPINE BESYLATE 5 MG/1
1 TABLET ORAL DAILY
COMMUNITY
Start: 2024-05-28

## 2024-06-11 RX ORDER — ASCORBIC ACID 125 MG
TABLET,CHEWABLE ORAL
COMMUNITY

## 2024-06-11 RX ORDER — PLANT STANOL ESTER 450 MG
595 TABLET ORAL
COMMUNITY
Start: 2024-05-22

## 2024-06-11 NOTE — PROGRESS NOTES
Hematology/Oncology Outpatient Consultation    Patient name: Jeanne King  : 1960  MRN: [id number]  Primary Care Physician: Provider, No Known  Referring Physician: No ref. provider found  Reason For Consult:     No chief complaint on file.      History of Present Illness:    Past Medical History:   Diagnosis Date    COPD (chronic obstructive pulmonary disease)     Hypertension        Past Surgical History:   Procedure Laterality Date    BRONCHOSCOPY N/A 5/10/2024    Procedure: BRONCHOSCOPY WITH BRONCHOALVEOLAR LAVAGE AND ENDOBRONCHIAL ULTRASOUND WITH FINE NEEDLE ASPIRATION x2;  Surgeon: Bessy Mcguire MD;  Location: [facility] ENDOSCOPY;  Service: Pulmonary;  Laterality: N/A;  lung mass    HYSTERECTOMY           Current Outpatient Medications:     acetaZOLAMIDE (DIAMOX) 250 MG tablet, Take 1 tablet by mouth Daily., Disp: 30 tablet, Rfl: 0    albuterol sulfate  (90 Base) MCG/ACT inhaler, Inhale 2 puffs Every 4 (Four) Hours As Needed for Wheezing., Disp: , Rfl:     ALPRAZolam (XANAX) 1 MG tablet, Take 1 tablet by mouth 3 (Three) Times a Day As Needed for Anxiety., Disp: , Rfl:     budesonide (PULMICORT) 0.5 MG/2ML nebulizer solution, Take 2 mL by nebulization 2 (Two) Times a Day., Disp: 120 mL, Rfl: 0    FLUoxetine (PROzac) 10 MG capsule, Take 1 capsule by mouth Daily., Disp: , Rfl:     FLUoxetine (PROzac) 20 MG capsule, Take 1 capsule by mouth Daily., Disp: , Rfl:     folic acid (FOLVITE) 1 MG tablet, Take 1 tablet by mouth Daily., Disp: 30 tablet, Rfl: 0    furosemide (LASIX) 40 MG tablet, Take 1 tablet by mouth Daily., Disp: 30 tablet, Rfl: 0    ipratropium-albuterol (DUO-NEB) 0.5-2.5 mg/3 ml nebulizer, Take 3 mL by nebulization Every 4 (Four) Hours As Needed for Shortness of Air or Wheezing. Copd j44.9, Disp: 360 mL, Rfl: 3    metoprolol succinate XL (TOPROL-XL) 25 MG 24 hr tablet, Take 1 tablet by mouth Every 12 (Twelve) Hours., Disp: 60 tablet, Rfl: 0    nicotine (NICODERM CQ) 21 MG/24HR patch,  Place 1 patch on the skin as directed by provider Daily., Disp: 28 each, Rfl: 0    sennosides-docusate (PERICOLACE) 8.6-50 MG per tablet, Take 2 tablets by mouth 2 (Two) Times a Day As Needed for Constipation., Disp: 30 tablet, Rfl: 0    Allergies   Allergen Reactions    Keflex [Cephalexin] Mental Status Change and Provider Review Needed    Morphine Mental Status Change    Premarin [Conjugated Estrogens] Mental Status Change         There is no immunization history on file for this patient.    Family History   Problem Relation Age of Onset    No Known Problems Mother     No Known Problems Father     No Known Problems Sister     No Known Problems Brother        Cancer-related family history is not on file.    Social History     Tobacco Use    Smoking status: Every Day     Current packs/day: 1.50     Average packs/day: 1.5 packs/day for 53.4 years (80.2 ttl pk-yrs)     Types: Cigarettes     Start date: 1971    Smokeless tobacco: Never   Vaping Use    Vaping status: Never Used   Substance Use Topics    Alcohol use: Never    Drug use: Never       ROS:    Review of Systems    Objective:    There were no vitals filed for this visit.  There is no height or weight on file to calculate BMI.    ECOG  {[facility] ECOG Status:93238}    Physical Exam:  Physical Exam    RECENT LABS  WBC   Date Value Ref Range Status   05/12/2024 10.66 3.40 - 10.80 10*3/mm3 Final     RBC   Date Value Ref Range Status   05/12/2024 6.11 (H) 3.77 - 5.28 10*6/mm3 Final     Hemoglobin   Date Value Ref Range Status   05/12/2024 17.8 (H) 12.0 - 15.9 g/dL Final     Hematocrit   Date Value Ref Range Status   05/12/2024 58.7 (H) 34.0 - 46.6 % Final     MCV   Date Value Ref Range Status   05/12/2024 96.1 79.0 - 97.0 fL Final     MCH   Date Value Ref Range Status   05/12/2024 29.1 26.6 - 33.0 pg Final     MCHC   Date Value Ref Range Status   05/12/2024 30.3 (L) 31.5 - 35.7 g/dL Final     RDW   Date Value Ref Range Status   05/12/2024 15.6 (H) 12.3 -  15.4 % Final     RDW-SD   Date Value Ref Range Status   05/12/2024 54.9 (H) 37.0 - 54.0 fl Final     MPV   Date Value Ref Range Status   05/12/2024 9.4 6.0 - 12.0 fL Final     Platelets   Date Value Ref Range Status   05/12/2024 102 (L) 140 - 450 10*3/mm3 Final     Neutrophil %   Date Value Ref Range Status   05/12/2024 83.1 (H) 42.7 - 76.0 % Final     Lymphocyte %   Date Value Ref Range Status   05/12/2024 9.1 (L) 19.6 - 45.3 % Final     Monocyte %   Date Value Ref Range Status   05/12/2024 6.8 5.0 - 12.0 % Final     Eosinophil %   Date Value Ref Range Status   05/12/2024 0.0 (L) 0.3 - 6.2 % Final     Basophil %   Date Value Ref Range Status   05/12/2024 0.2 0.0 - 1.5 % Final     Immature Grans %   Date Value Ref Range Status   05/12/2024 0.8 (H) 0.0 - 0.5 % Final     Neutrophils, Absolute   Date Value Ref Range Status   05/12/2024 8.87 (H) 1.70 - 7.00 10*3/mm3 Final     Lymphocytes, Absolute   Date Value Ref Range Status   05/12/2024 0.97 0.70 - 3.10 10*3/mm3 Final     Monocytes, Absolute   Date Value Ref Range Status   05/12/2024 0.72 0.10 - 0.90 10*3/mm3 Final     Eosinophils, Absolute   Date Value Ref Range Status   05/12/2024 0.00 0.00 - 0.40 10*3/mm3 Final     Basophils, Absolute   Date Value Ref Range Status   05/12/2024 0.02 0.00 - 0.20 10*3/mm3 Final     Immature Grans, Absolute   Date Value Ref Range Status   05/12/2024 0.08 (H) 0.00 - 0.05 10*3/mm3 Final     nRBC   Date Value Ref Range Status   05/12/2024 0.0 0.0 - 0.2 /100 WBC Final       Lab Results   Component Value Date    GLUCOSE 128 (H) 05/13/2024    BUN 22 05/13/2024    CREATININE 0.61 05/13/2024    BCR 36.1 (H) 05/13/2024    K 4.0 05/13/2024    CO2 38.0 (H) 05/13/2024    CALCIUM 9.6 05/13/2024    ALBUMIN 3.7 05/13/2024    AST 15 05/13/2024    ALT 51 (H) 05/13/2024         Assessment & Plan   There are no diagnoses linked to this encounter.          I have reviewed labs results, imaging, vitals, and medications with the patient today.  Will follow  up in *** months with ***.    I counselled Jeanne of the risks of continuing to use tobacco and cessation.    During this visit, I spent 3-10 minutes counseling the patient regarding tobacco cessation.     Patient verbalized understanding and is in agreement of the above plan.

## 2024-06-11 NOTE — PROGRESS NOTES
Hematology/Oncology Outpatient Follow Up    PATIENT NAME:Jeanne King  :1960  MRN: [id number]  PRIMARY CARE PHYSICIAN: Provider, No Known  REFERRING PHYSICIAN: No ref. provider found    Chief Complaint   Patient presents with    Follow-up     Lung mass        HISTORY OF PRESENT ILLNESS:     Jeanne King is a 64 y.o. female who [facility].  Past medical history of hypertension, COPD.  She reported that she has not felt well for some time.  She does not like going to the doctor and is anxious about seeking medical treatment.  She reported having shortness of breath and cough that she attributed to lingering effects of COVID infection and did not seek medical treatment.  [facility] and was negative for pulmonary embolism, did show a right lower lobe spiculated mass lesion measuring 2.3 cm and a more distal lesion measuring 1.7 cm along with mediastinal lymphadenopathy with a right hilar node measuring 2.1 cm.  Upper abdomen showed a cirrhotic enlarged liver hence splenomegaly along with abdominal ascites.     24  Hematology/Oncology was consulted for lung lesions suspicious for primary lung malignancy.  The patient reports that she is a current smoker with a 50-year history of smoking on average 1 and half packs per day.  Her family history is positive for lung cancer with her brother and liver cancer and another brother.  She reports that she is not up-to-date with any routine screenings for malignancy.     She has been found to have right lower lobe spiculated mass measuring 2.3 cm, another mass that measures 1.7 cm with mediastinal lymphadenopathy.  There is also evidence of cirrhosis of the liver with portal hypertension.  We have been asked to see patient due to lung lesions.      5/10/2024 patient had right hilar node and subcarinal lymph node with no  evidence of malignancy.  The right upper lobe bile was negative for malignancy.  6/8/2024: Patient had a PET CT scan which basically revealed no hypermetabolic adenopathy in the neck.  Within the superior segment of the right lower lobe extending to involve the right pulmonary fissure there is a 2.5 cm x 2.8 cm hypermetabolic with SUV of 8.6 consistent with malignancy.  There is no hypermetabolic mediastinal or hilar adenopathy.  No additional hypermetabolic pulmonary nodule was seen.  Dilated main pulmonary artery measuring up to 4.2 cm seen in pulmonary hypertension severely mildly enlarged mediastinal lymph nodes again noted but not hypermetabolic.  There is a right paratracheal node that measures 1.4 cm.  She has bilateral adrenal adenomas.  There is no hypermetabolic bone lesions to indicate metastatic disease.  Past Medical History:   Diagnosis Date    COPD (chronic obstructive pulmonary disease)     Hypertension        Past Surgical History:   Procedure Laterality Date    BRONCHOSCOPY N/A 5/10/2024    Procedure: BRONCHOSCOPY WITH BRONCHOALVEOLAR LAVAGE AND ENDOBRONCHIAL ULTRASOUND WITH FINE NEEDLE ASPIRATION x2;  Surgeon: Bessy Mcguire MD;  Location: [facility] ENDOSCOPY;  Service: Pulmonary;  Laterality: N/A;  lung mass    HYSTERECTOMY           Current Outpatient Medications:     amLODIPine (NORVASC) 5 MG tablet, Take 1 tablet by mouth Daily., Disp: , Rfl:     aspirin 81 MG EC tablet, 1 tablet., Disp: , Rfl:     Fluticasone-Salmeterol (ADVAIR/WIXELA) 250-50 MCG/ACT DISKUS, Inhale 1 puff 2 (Two) Times a Day., Disp: , Rfl:     Magnesium Oxide -Mg Supplement 500 MG tablet, Take 1 tablet by mouth Daily., Disp: , Rfl:     Magnesium Oxide, Antacid, 500 MG capsule, Take  by mouth., Disp: , Rfl:     Potassium Gluconate 550 (90 K) MG tablet, 595 mg., Disp: , Rfl:     acetaZOLAMIDE (DIAMOX) 250 MG tablet, Take 1 tablet by mouth Daily., Disp: 30 tablet, Rfl: 0    albuterol sulfate  (90 Base) MCG/ACT inhaler,  Inhale 2 puffs Every 4 (Four) Hours As Needed for Wheezing., Disp: , Rfl:     ALPRAZolam (XANAX) 1 MG tablet, Take 1 tablet by mouth 3 (Three) Times a Day As Needed for Anxiety., Disp: , Rfl:     budesonide (PULMICORT) 0.5 MG/2ML nebulizer solution, Take 2 mL by nebulization 2 (Two) Times a Day., Disp: 120 mL, Rfl: 0    FLUoxetine (PROzac) 10 MG capsule, Take 1 capsule by mouth Daily., Disp: , Rfl:     FLUoxetine (PROzac) 20 MG capsule, Take 1 capsule by mouth Daily., Disp: , Rfl:     folic acid (FOLVITE) 1 MG tablet, Take 1 tablet by mouth Daily., Disp: 30 tablet, Rfl: 0    furosemide (LASIX) 40 MG tablet, Take 1 tablet by mouth Daily., Disp: 30 tablet, Rfl: 0    ipratropium-albuterol (DUO-NEB) 0.5-2.5 mg/3 ml nebulizer, Take 3 mL by nebulization Every 4 (Four) Hours As Needed for Shortness of Air or Wheezing. Copd j44.9, Disp: 360 mL, Rfl: 3    Magnesium Salicylate 500 MG tablet, , Disp: , Rfl:     metoprolol succinate XL (TOPROL-XL) 25 MG 24 hr tablet, Take 1 tablet by mouth Every 12 (Twelve) Hours., Disp: 60 tablet, Rfl: 0    sennosides-docusate (PERICOLACE) 8.6-50 MG per tablet, Take 2 tablets by mouth 2 (Two) Times a Day As Needed for Constipation., Disp: 30 tablet, Rfl: 0    Allergies   Allergen Reactions    Hydrocodone Unknown - Low Severity    Keflex [Cephalexin] Mental Status Change and Provider Review Needed    Morphine Mental Status Change    Premarin [Conjugated Estrogens] Mental Status Change       Family History   Problem Relation Age of Onset    No Known Problems Mother     No Known Problems Father     No Known Problems Sister     No Known Problems Brother        Cancer-related family history is not on file.    Social History     Tobacco Use    Smoking status: Former     Current packs/day: 1.50     Average packs/day: 1.5 packs/day for 53.4 years (80.2 ttl pk-yrs)     Types: Cigarettes     Start date: 1971    Smokeless tobacco: Never   Vaping Use    Vaping status: Never Used   Substance Use Topics     "Alcohol use: Never    Drug use: Never       I have reviewed and confirmed the accuracy of the patient's history: Chief complaint, HPI, ROS, and Subjective as entered by the MA/LPN/RN. Laury Castillo MD 06/11/24         SUBJECTIVE:     Patient is here today for routine follow-up and does not have any new issues.  She remains on 3 L of oxygen continuous    REVIEW OF SYSTEMS:  Review of Systems   Constitutional:  Positive for fatigue. Negative for chills and fever.   HENT:  Negative for congestion, drooling, ear discharge, rhinorrhea, sinus pressure and tinnitus.    Eyes:  Negative for photophobia, pain and discharge.   Respiratory:  Negative for apnea, choking and stridor.    Cardiovascular:  Negative for palpitations.   Gastrointestinal:  Negative for abdominal distention, abdominal pain and anal bleeding.   Endocrine: Negative for polydipsia and polyphagia.   Genitourinary:  Negative for decreased urine volume, flank pain and genital sores.   Musculoskeletal:  Negative for gait problem, neck pain and neck stiffness.   Skin:  Negative for color change, rash and wound.   Neurological:  Positive for weakness. Negative for tremors, seizures, syncope, facial asymmetry and speech difficulty.   Hematological:  Negative for adenopathy.   Psychiatric/Behavioral:  Negative for agitation, confusion, hallucinations and self-injury. The patient is not hyperactive.        OBJECTIVE:    Vitals:    06/11/24 1452   BP: 116/71   Pulse: 67   Resp: 18   Temp: 98 °F (36.7 °C)   TempSrc: Infrared   SpO2: 94%  Comment: 3 L oxygen   Weight: 96.6 kg (213 lb)   Height: 172.7 cm (68\")   PainSc: 0-No pain     Body mass index is 32.39 kg/m².    ECOG  (1) Restricted in physically strenuous activity, ambulatory and able to do work of light nature    Physical Exam  Vitals and nursing note reviewed.   Constitutional:       General: She is not in acute distress.     Appearance: She is not diaphoretic.   HENT:      Head: Normocephalic and " atraumatic.   Eyes:      General: No scleral icterus.        Right eye: No discharge.         Left eye: No discharge.      Conjunctiva/sclera: Conjunctivae normal.   Neck:      Thyroid: No thyromegaly.   Cardiovascular:      Rate and Rhythm: Normal rate and regular rhythm.      Heart sounds: Normal heart sounds.      No friction rub. No gallop.   Pulmonary:      Effort: Pulmonary effort is normal. No respiratory distress.      Breath sounds: No stridor. Rhonchi present. No wheezing.   Abdominal:      General: Bowel sounds are normal.      Palpations: Abdomen is soft. There is no mass.      Tenderness: There is no abdominal tenderness. There is no guarding or rebound.   Musculoskeletal:         General: No tenderness. Normal range of motion.      Cervical back: Normal range of motion and neck supple.   Lymphadenopathy:      Cervical: No cervical adenopathy.   Skin:     General: Skin is warm.      Findings: No erythema or rash.   Neurological:      Mental Status: She is alert and oriented to person, place, and time.      Motor: No abnormal muscle tone.   Psychiatric:         Behavior: Behavior normal.         RECENT LABS  WBC   Date Value Ref Range Status   06/11/2024 8.37 3.40 - 10.80 10*3/mm3 Final     RBC   Date Value Ref Range Status   06/11/2024 5.84 (H) 3.77 - 5.28 10*6/mm3 Final     Hemoglobin   Date Value Ref Range Status   06/11/2024 16.5 (H) 12.0 - 15.9 g/dL Final     Hematocrit   Date Value Ref Range Status   06/11/2024 53.5 (H) 34.0 - 46.6 % Final     MCV   Date Value Ref Range Status   06/11/2024 91.6 79.0 - 97.0 fL Final     MCH   Date Value Ref Range Status   06/11/2024 28.3 26.6 - 33.0 pg Final     MCHC   Date Value Ref Range Status   06/11/2024 30.8 (L) 31.5 - 35.7 g/dL Final     RDW   Date Value Ref Range Status   06/11/2024 16.2 (H) 12.3 - 15.4 % Final     RDW-SD   Date Value Ref Range Status   06/11/2024 52.4 37.0 - 54.0 fl Final     MPV   Date Value Ref Range Status   06/11/2024 10.7 6.0 - 12.0 fL  Final     Platelets   Date Value Ref Range Status   06/11/2024 115 (L) 140 - 450 10*3/mm3 Final     Neutrophil %   Date Value Ref Range Status   06/11/2024 61.1 42.7 - 76.0 % Final     Lymphocyte %   Date Value Ref Range Status   06/11/2024 29.4 19.6 - 45.3 % Final     Monocyte %   Date Value Ref Range Status   06/11/2024 7.2 5.0 - 12.0 % Final     Eosinophil %   Date Value Ref Range Status   06/11/2024 1.8 0.3 - 6.2 % Final     Basophil %   Date Value Ref Range Status   06/11/2024 0.5 0.0 - 1.5 % Final     Immature Grans %   Date Value Ref Range Status   05/12/2024 0.8 (H) 0.0 - 0.5 % Final     Neutrophils, Absolute   Date Value Ref Range Status   06/11/2024 5.12 1.70 - 7.00 10*3/mm3 Final     Lymphocytes, Absolute   Date Value Ref Range Status   06/11/2024 2.46 0.70 - 3.10 10*3/mm3 Final     Monocytes, Absolute   Date Value Ref Range Status   06/11/2024 0.60 0.10 - 0.90 10*3/mm3 Final     Eosinophils, Absolute   Date Value Ref Range Status   06/11/2024 0.15 0.00 - 0.40 10*3/mm3 Final     Basophils, Absolute   Date Value Ref Range Status   06/11/2024 0.04 0.00 - 0.20 10*3/mm3 Final     Immature Grans, Absolute   Date Value Ref Range Status   05/12/2024 0.08 (H) 0.00 - 0.05 10*3/mm3 Final     nRBC   Date Value Ref Range Status   05/12/2024 0.0 0.0 - 0.2 /100 WBC Final       Lab Results   Component Value Date    GLUCOSE 128 (H) 05/13/2024    BUN 22 05/13/2024    CREATININE 0.61 05/13/2024    BCR 36.1 (H) 05/13/2024    K 4.0 05/13/2024    CO2 38.0 (H) 05/13/2024    CALCIUM 9.6 05/13/2024    ALBUMIN 3.7 05/13/2024    AST 15 05/13/2024    ALT 51 (H) 05/13/2024         Assessment & Plan     Lung mass  - CBC & Differential      ASSESSMENT:    PET positive right lung mass, status post bronchoscopy biopsy of subcarinal and right hilar nodes were negative for malignancy.  Bal negative.  Malignancy not confirmed.  But remains highly suspicious.  Will refer to Dr. Smith for bx  Polycythemia: Hemoglobin 18.2 g/dL/hematocrit 61.9,  normal WBC.  EPO is low at 2.5 range is 2.6-18.5 worrisome for myeloproliferative disease..  JAK2 negative.  Will complete workup with complete MPL panel.  Hemoglobin today is down to 16.5 g per DL  Thrombocytopenia: No baseline.  In the setting of cirrhosis and splenomegaly.  Will assess for nutritional deficiencies.  Platelets have normalized 242,000.  Normal B12.  Folate low at 4.27.  Continue folic acid 1 mg p.o. daily x 3 months.  Cirrhosis of the liver: New diagnosis, seen on Ultrasound of the abdomen.  Referred to GI  Folate deficiency patient is on folic acid replacement therapy.  1 mg p.o. daily  Posthospital visit     PLANS      Refer to cardiothoracic surgeon Dr Smith biopsy right lung mass remains highly suspicious  Comprehensive MPL panel  Referred to GI for possible cirrhosis  Check iron studies with ferritin, family history of cirrhosis of the liver  Follow-up 4 weeks  All questions answered               I spent 40 total minutes, face-to-face, caring for Jeanne today. 90% of this time involved counseling and/or coordination of care as documented within this note.

## 2024-06-11 NOTE — PROGRESS NOTES
Hematology/Oncology Outpatient Consultation    Patient name: Jeanne King  : 1960  MRN: [id number]  Primary Care Physician: Provider, No Known  Referring Physician: No ref. provider found  Reason For Consult:     Chief Complaint   Patient presents with    Follow-up     Lung mass       History of Present Illness:    Past Medical History:   Diagnosis Date    COPD (chronic obstructive pulmonary disease)     Hypertension        Past Surgical History:   Procedure Laterality Date    BRONCHOSCOPY N/A 5/10/2024    Procedure: BRONCHOSCOPY WITH BRONCHOALVEOLAR LAVAGE AND ENDOBRONCHIAL ULTRASOUND WITH FINE NEEDLE ASPIRATION x2;  Surgeon: Bessy Mcguire MD;  Location: [facility] ENDOSCOPY;  Service: Pulmonary;  Laterality: N/A;  lung mass    HYSTERECTOMY           Current Outpatient Medications:     amLODIPine (NORVASC) 5 MG tablet, Take 1 tablet by mouth Daily., Disp: , Rfl:     aspirin 81 MG EC tablet, 1 tablet., Disp: , Rfl:     Fluticasone-Salmeterol (ADVAIR/WIXELA) 250-50 MCG/ACT DISKUS, Inhale 1 puff 2 (Two) Times a Day., Disp: , Rfl:     Magnesium Oxide -Mg Supplement 500 MG tablet, Take 1 tablet by mouth Daily., Disp: , Rfl:     Magnesium Oxide, Antacid, 500 MG capsule, Take  by mouth., Disp: , Rfl:     Potassium Gluconate 550 (90 K) MG tablet, 595 mg., Disp: , Rfl:     acetaZOLAMIDE (DIAMOX) 250 MG tablet, Take 1 tablet by mouth Daily., Disp: 30 tablet, Rfl: 0    albuterol sulfate  (90 Base) MCG/ACT inhaler, Inhale 2 puffs Every 4 (Four) Hours As Needed for Wheezing., Disp: , Rfl:     ALPRAZolam (XANAX) 1 MG tablet, Take 1 tablet by mouth 3 (Three) Times a Day As Needed for Anxiety., Disp: , Rfl:     budesonide (PULMICORT) 0.5 MG/2ML nebulizer solution, Take 2 mL by nebulization 2 (Two) Times a Day., Disp: 120 mL, Rfl: 0    FLUoxetine (PROzac) 10 MG capsule, Take 1 capsule by mouth Daily., Disp: , Rfl:     FLUoxetine (PROzac) 20 MG capsule, Take 1 capsule by mouth Daily., Disp: , Rfl:     folic acid  "(FOLVITE) 1 MG tablet, Take 1 tablet by mouth Daily., Disp: 30 tablet, Rfl: 0    furosemide (LASIX) 40 MG tablet, Take 1 tablet by mouth Daily., Disp: 30 tablet, Rfl: 0    ipratropium-albuterol (DUO-NEB) 0.5-2.5 mg/3 ml nebulizer, Take 3 mL by nebulization Every 4 (Four) Hours As Needed for Shortness of Air or Wheezing. Copd j44.9, Disp: 360 mL, Rfl: 3    Magnesium Salicylate 500 MG tablet, , Disp: , Rfl:     metoprolol succinate XL (TOPROL-XL) 25 MG 24 hr tablet, Take 1 tablet by mouth Every 12 (Twelve) Hours., Disp: 60 tablet, Rfl: 0    sennosides-docusate (PERICOLACE) 8.6-50 MG per tablet, Take 2 tablets by mouth 2 (Two) Times a Day As Needed for Constipation., Disp: 30 tablet, Rfl: 0    Allergies   Allergen Reactions    Hydrocodone Unknown - Low Severity    Keflex [Cephalexin] Mental Status Change and Provider Review Needed    Morphine Mental Status Change    Premarin [Conjugated Estrogens] Mental Status Change       Immunization History   Administered Date(s) Administered    Tdap 02/22/2019       Family History   Problem Relation Age of Onset    No Known Problems Mother     No Known Problems Father     No Known Problems Sister     No Known Problems Brother        Cancer-related family history is not on file.    Social History     Tobacco Use    Smoking status: Former     Current packs/day: 1.50     Average packs/day: 1.5 packs/day for 53.4 years (80.2 ttl pk-yrs)     Types: Cigarettes     Start date: 1971    Smokeless tobacco: Never   Vaping Use    Vaping status: Never Used   Substance Use Topics    Alcohol use: Never    Drug use: Never       ROS:    Review of Systems    Objective:    Vitals:    06/11/24 1452   BP: 116/71   Pulse: 67   Resp: 18   Temp: 98 °F (36.7 °C)   TempSrc: Infrared   SpO2: 94%  Comment: 3 L oxygen   Weight: 96.6 kg (213 lb)   Height: 172.7 cm (68\")   PainSc: 0-No pain     Body mass index is 32.39 kg/m².    ECOG  {[facility] ECOG Status:92324}    Physical Exam:  Physical " Exam    RECENT LABS  WBC   Date Value Ref Range Status   06/11/2024 8.37 3.40 - 10.80 10*3/mm3 Final     RBC   Date Value Ref Range Status   06/11/2024 5.84 (H) 3.77 - 5.28 10*6/mm3 Final     Hemoglobin   Date Value Ref Range Status   06/11/2024 16.5 (H) 12.0 - 15.9 g/dL Final     Hematocrit   Date Value Ref Range Status   06/11/2024 53.5 (H) 34.0 - 46.6 % Final     MCV   Date Value Ref Range Status   06/11/2024 91.6 79.0 - 97.0 fL Final     MCH   Date Value Ref Range Status   06/11/2024 28.3 26.6 - 33.0 pg Final     MCHC   Date Value Ref Range Status   06/11/2024 30.8 (L) 31.5 - 35.7 g/dL Final     RDW   Date Value Ref Range Status   06/11/2024 16.2 (H) 12.3 - 15.4 % Final     RDW-SD   Date Value Ref Range Status   06/11/2024 52.4 37.0 - 54.0 fl Final     MPV   Date Value Ref Range Status   06/11/2024 10.7 6.0 - 12.0 fL Final     Platelets   Date Value Ref Range Status   06/11/2024 115 (L) 140 - 450 10*3/mm3 Final     Neutrophil %   Date Value Ref Range Status   06/11/2024 61.1 42.7 - 76.0 % Final     Lymphocyte %   Date Value Ref Range Status   06/11/2024 29.4 19.6 - 45.3 % Final     Monocyte %   Date Value Ref Range Status   06/11/2024 7.2 5.0 - 12.0 % Final     Eosinophil %   Date Value Ref Range Status   06/11/2024 1.8 0.3 - 6.2 % Final     Basophil %   Date Value Ref Range Status   06/11/2024 0.5 0.0 - 1.5 % Final     Immature Grans %   Date Value Ref Range Status   05/12/2024 0.8 (H) 0.0 - 0.5 % Final     Neutrophils, Absolute   Date Value Ref Range Status   06/11/2024 5.12 1.70 - 7.00 10*3/mm3 Final     Lymphocytes, Absolute   Date Value Ref Range Status   06/11/2024 2.46 0.70 - 3.10 10*3/mm3 Final     Monocytes, Absolute   Date Value Ref Range Status   06/11/2024 0.60 0.10 - 0.90 10*3/mm3 Final     Eosinophils, Absolute   Date Value Ref Range Status   06/11/2024 0.15 0.00 - 0.40 10*3/mm3 Final     Basophils, Absolute   Date Value Ref Range Status   06/11/2024 0.04 0.00 - 0.20 10*3/mm3 Final     Immature  Grans, Absolute   Date Value Ref Range Status   05/12/2024 0.08 (H) 0.00 - 0.05 10*3/mm3 Final     nRBC   Date Value Ref Range Status   05/12/2024 0.0 0.0 - 0.2 /100 WBC Final       Lab Results   Component Value Date    GLUCOSE 128 (H) 05/13/2024    BUN 22 05/13/2024    CREATININE 0.61 05/13/2024    BCR 36.1 (H) 05/13/2024    K 4.0 05/13/2024    CO2 38.0 (H) 05/13/2024    CALCIUM 9.6 05/13/2024    ALBUMIN 3.7 05/13/2024    AST 15 05/13/2024    ALT 51 (H) 05/13/2024         Assessment & Plan   Lung mass  - CBC & Differential  - Iron Profile  - Ferritin  - MPL Mutation Analysis    Other abnormality of red blood cells  - Iron Profile  - Ferritin  - MPL Mutation Analysis    Other specified diseases of blood and blood-forming organs  - MPL Mutation Analysis            I have reviewed labs results, imaging, vitals, and medications with the patient today.  Will follow up in *** months with ***.    I counselled Jeanne of the risks of continuing to use tobacco and cessation.    During this visit, I spent 3-10 minutes counseling the patient regarding tobacco cessation.     Patient verbalized understanding and is in agreement of the above plan.

## 2024-06-12 ENCOUNTER — TELEPHONE (OUTPATIENT)
Dept: ONCOLOGY | Facility: CLINIC | Age: 64
End: 2024-06-12
Payer: MEDICARE

## 2024-06-12 DIAGNOSIS — R79.0 RAISED SERUM IRON: Primary | ICD-10-CM

## 2024-06-12 NOTE — TELEPHONE ENCOUNTER
----- Message from Laury Castillo sent at 6/11/2024  5:53 PM EDT -----  She  should discontinue any oral iron supplementation that she is taking

## 2024-06-12 NOTE — TELEPHONE ENCOUNTER
Discussed iron labs with pt's daughter. She stated that the pt is not currently on any iron supplementation, including multivitamins. I advised her that Dr. Castillo would like to check an additional lab to see if the cause of the elevated iron is hereditary. She asked that the order be sent to [facility] and I told her I would fax it there. No questions or needs at this time.

## 2024-06-13 ENCOUNTER — TELEMEDICINE (OUTPATIENT)
Dept: SURGERY | Facility: CLINIC | Age: 64
End: 2024-06-13
Payer: MEDICARE

## 2024-06-13 ENCOUNTER — TELEPHONE (OUTPATIENT)
Dept: ONCOLOGY | Facility: OTHER | Age: 64
End: 2024-06-13

## 2024-06-13 ENCOUNTER — PATIENT OUTREACH (OUTPATIENT)
Dept: ONCOLOGY | Facility: CLINIC | Age: 64
End: 2024-06-13
Payer: MEDICARE

## 2024-06-13 ENCOUNTER — PREP FOR SURGERY (OUTPATIENT)
Dept: OTHER | Facility: HOSPITAL | Age: 64
End: 2024-06-13
Payer: MEDICARE

## 2024-06-13 DIAGNOSIS — R91.8 LUNG MASS: Primary | ICD-10-CM

## 2024-06-13 PROCEDURE — 99204 OFFICE O/P NEW MOD 45 MIN: CPT | Performed by: SURGERY

## 2024-06-13 NOTE — TELEPHONE ENCOUNTER
PASCALE CALLING FROM [facility]. NEEDS LAB ORDERS TO BE FAXED OVER.    SPOKE WITH DEEPALI IN CLINICAL SHE WILL FAX ORDERS -[phone].    LET PASCALE KNOW TO LOOK FOR THEM AND SHE V/U.

## 2024-06-13 NOTE — SIGNIFICANT NOTE
Spoke with Dr. Smith about this patient and referral from Dr. Castillo. He will do a video visit with patient this week and discuss plan. Spoke with daughter and they can do the visit and discuss plan. They are aware the thoracic office staff will be calling today to schedule.

## 2024-06-14 LAB
MYCOBACTERIUM SPEC CULT: NORMAL
NIGHT BLUE STAIN TISS: NORMAL

## 2024-06-17 ENCOUNTER — ANESTHESIA EVENT (OUTPATIENT)
Dept: GASTROENTEROLOGY | Facility: HOSPITAL | Age: 64
End: 2024-06-17
Payer: MEDICARE

## 2024-06-17 NOTE — ANESTHESIA PREPROCEDURE EVALUATION
Anesthesia Evaluation     Patient summary reviewed and Nursing notes reviewed   no history of anesthetic complications:   NPO Solid Status: > 8 hours  NPO Liquid Status: > 2 hours           Airway   Dental      Pulmonary    (+) a smoker Former, COPD,  Cardiovascular     ECG reviewed  PT is on anticoagulation therapy  Patient on routine beta blocker    (+) hypertension, dysrhythmias PAC, Bradycardia      Neuro/Psych  (+) psychiatric history Depression and Bipolar  GI/Hepatic/Renal/Endo      Musculoskeletal     Abdominal    Substance History      OB/GYN          Other   blood dyscrasia thrombocytopenia,     ROS/Med Hx Other: Additional History:  Elevated bilirubin, lung mass, pulmonary edema, chronic respiratory failure with hypoxemia/hypercapnia, COPD exacerbation, chronic bronchitis    Echo:  Echocardiogram Findings    Left Ventricle Left ventricular systolic function is normal. Left ventricular ejection fraction appears to be 56 - 60%.     Normal left ventricular cavity size and wall thickness noted. All left ventricular wall segments contract normally. Left ventricular diastolic function is consistent with (grade I) impaired relaxation. Normal left atrial pressure. No evidence of left ventricular thrombus or mass present.  Right Ventricle The right ventricular cavity is moderately dilated. Mildly reduced right ventricular systolic function noted.  Left Atrium Normal left atrial cavity size noted.  Right Atrium Normal right atrial cavity size noted.  Aortic Valve The aortic valve is not well visualized. The aortic valve is grossly normal in structure. No significant aortic valve regurgitation is present. No hemodynamically significant aortic valve stenosis is present.  Mitral Valve The mitral valve is structurally normal with no significant stenosis present. No significant mitral valve regurgitation is present.  Tricuspid Valve The tricuspid valve is structurally normal with no significant stenosis present. Trace  tricuspid valve regurgitation is present. Estimated right ventricular systolic pressure from tricuspid regurgitation is normal (<35 mmHg).  Pulmonic Valve The pulmonic valve is not well visualized.  Greater Vessels No dilation of the aortic root is present.  Pericardium There is no evidence of pericardial effusion. .        PSH:  HYSTERECTOMY BRONCHOSCOPY              Anesthesia Plan    ASA 4     general     (Patient identified; pre-operative vital signs, all relevant labs/studies, complete medical/surgical/anesthetic history, full medication list, full allergy list, and NPO status obtained/reviewed; physical assessment performed; anesthetic options, side effects, potential complications, risks, and benefits discussed; questions answered; written anesthesia consent obtained; patient cleared for procedure; anesthesia machine and equipment checked and functioning)  intravenous induction     Anesthetic plan, risks, benefits, and alternatives have been provided, discussed and informed consent has been obtained with: patient.    Plan discussed with CRNA and CAA.    CODE STATUS:

## 2024-06-18 ENCOUNTER — HOSPITAL ENCOUNTER (OUTPATIENT)
Dept: CT IMAGING | Facility: HOSPITAL | Age: 64
Discharge: HOME | End: 2024-06-18
Payer: MEDICARE

## 2024-06-18 ENCOUNTER — APPOINTMENT (OUTPATIENT)
Dept: GENERAL RADIOLOGY | Facility: HOSPITAL | Age: 64
End: 2024-06-18
Payer: MEDICARE

## 2024-06-18 ENCOUNTER — HOSPITAL ENCOUNTER (OUTPATIENT)
Facility: HOSPITAL | Age: 64
Setting detail: HOSPITAL OUTPATIENT SURGERY
Discharge: HOME | End: 2024-06-18
Attending: SURGERY | Admitting: SURGERY
Payer: MEDICARE

## 2024-06-18 ENCOUNTER — ANESTHESIA (OUTPATIENT)
Dept: GASTROENTEROLOGY | Facility: HOSPITAL | Age: 64
End: 2024-06-18
Payer: MEDICARE

## 2024-06-18 ENCOUNTER — LAB (OUTPATIENT)
Dept: LAB | Facility: HOSPITAL | Age: 64
End: 2024-06-18
Payer: MEDICARE

## 2024-06-18 VITALS
BODY MASS INDEX: 32.25 KG/M2 | SYSTOLIC BLOOD PRESSURE: 149 MMHG | HEIGHT: 68 IN | RESPIRATION RATE: 13 BRPM | HEART RATE: 60 BPM | WEIGHT: 212.8 LBS | DIASTOLIC BLOOD PRESSURE: 102 MMHG | TEMPERATURE: 97.9 F | OXYGEN SATURATION: 95 %

## 2024-06-18 DIAGNOSIS — R91.8 LUNG MASS: ICD-10-CM

## 2024-06-18 LAB
ANION GAP SERPL CALCULATED.3IONS-SCNC: 7.1 MMOL/L (ref 5–15)
BUN SERPL-MCNC: 12 MG/DL (ref 8–23)
BUN/CREAT SERPL: 19 (ref 7–25)
CALCIUM SPEC-SCNC: 9.4 MG/DL (ref 8.6–10.5)
CHLORIDE SERPL-SCNC: 101 MMOL/L (ref 98–107)
CO2 SERPL-SCNC: 34.9 MMOL/L (ref 22–29)
CREAT SERPL-MCNC: 0.63 MG/DL (ref 0.57–1)
EGFRCR SERPLBLD CKD-EPI 2021: 98.6 ML/MIN/1.73
GLUCOSE SERPL-MCNC: 109 MG/DL (ref 65–99)
POTASSIUM SERPL-SCNC: 3.8 MMOL/L (ref 3.5–5.2)
SODIUM SERPL-SCNC: 143 MMOL/L (ref 136–145)

## 2024-06-18 PROCEDURE — 88172 CYTP DX EVAL FNA 1ST EA SITE: CPT | Performed by: SURGERY

## 2024-06-18 PROCEDURE — 25010000002 DEXAMETHASONE PER 1 MG: Performed by: NURSE ANESTHETIST, CERTIFIED REGISTERED

## 2024-06-18 PROCEDURE — 88177 CYTP FNA EVAL EA ADDL: CPT | Performed by: SURGERY

## 2024-06-18 PROCEDURE — 25010000002 ONDANSETRON PER 1 MG: Performed by: NURSE ANESTHETIST, CERTIFIED REGISTERED

## 2024-06-18 PROCEDURE — 88342 IMHCHEM/IMCYTCHM 1ST ANTB: CPT | Performed by: SURGERY

## 2024-06-18 PROCEDURE — 71045 X-RAY EXAM CHEST 1 VIEW: CPT

## 2024-06-18 PROCEDURE — 25010000002 SUCCINYLCHOLINE PER 20 MG: Performed by: NURSE ANESTHETIST, CERTIFIED REGISTERED

## 2024-06-18 PROCEDURE — 25010000002 PROPOFOL 1000 MG/100ML EMULSION: Performed by: NURSE ANESTHETIST, CERTIFIED REGISTERED

## 2024-06-18 PROCEDURE — 71250 CT THORAX DX C-: CPT

## 2024-06-18 PROCEDURE — 25010000002 SUGAMMADEX 200 MG/2ML SOLUTION: Performed by: NURSE ANESTHETIST, CERTIFIED REGISTERED

## 2024-06-18 PROCEDURE — 88341 IMHCHEM/IMCYTCHM EA ADD ANTB: CPT | Performed by: SURGERY

## 2024-06-18 PROCEDURE — 76000 FLUOROSCOPY <1 HR PHYS/QHP: CPT

## 2024-06-18 PROCEDURE — 88108 CYTOPATH CONCENTRATE TECH: CPT | Performed by: SURGERY

## 2024-06-18 PROCEDURE — 80048 BASIC METABOLIC PNL TOTAL CA: CPT | Performed by: SURGERY

## 2024-06-18 PROCEDURE — 25810000003 SODIUM CHLORIDE 0.9 % SOLUTION: Performed by: ANESTHESIOLOGY

## 2024-06-18 PROCEDURE — 36415 COLL VENOUS BLD VENIPUNCTURE: CPT | Performed by: SURGERY

## 2024-06-18 PROCEDURE — 88305 TISSUE EXAM BY PATHOLOGIST: CPT | Performed by: SURGERY

## 2024-06-18 RX ORDER — NALOXONE HCL 0.4 MG/ML
0.4 VIAL (ML) INJECTION AS NEEDED
Status: DISCONTINUED | OUTPATIENT
Start: 2024-06-18 | End: 2024-06-18 | Stop reason: HOSPADM

## 2024-06-18 RX ORDER — LIDOCAINE HYDROCHLORIDE 20 MG/ML
INJECTION, SOLUTION EPIDURAL; INFILTRATION; INTRACAUDAL; PERINEURAL AS NEEDED
Status: DISCONTINUED | OUTPATIENT
Start: 2024-06-18 | End: 2024-06-18 | Stop reason: SURG

## 2024-06-18 RX ORDER — SODIUM CHLORIDE 0.9 % (FLUSH) 0.9 %
10 SYRINGE (ML) INJECTION EVERY 12 HOURS SCHEDULED
Status: DISCONTINUED | OUTPATIENT
Start: 2024-06-18 | End: 2024-06-18 | Stop reason: HOSPADM

## 2024-06-18 RX ORDER — ONDANSETRON 2 MG/ML
4 INJECTION INTRAMUSCULAR; INTRAVENOUS ONCE AS NEEDED
Status: DISCONTINUED | OUTPATIENT
Start: 2024-06-18 | End: 2024-06-18 | Stop reason: HOSPADM

## 2024-06-18 RX ORDER — LABETALOL HYDROCHLORIDE 5 MG/ML
5 INJECTION, SOLUTION INTRAVENOUS
Status: DISCONTINUED | OUTPATIENT
Start: 2024-06-18 | End: 2024-06-18 | Stop reason: HOSPADM

## 2024-06-18 RX ORDER — SODIUM CHLORIDE 0.9 % (FLUSH) 0.9 %
10 SYRINGE (ML) INJECTION AS NEEDED
Status: DISCONTINUED | OUTPATIENT
Start: 2024-06-18 | End: 2024-06-18 | Stop reason: HOSPADM

## 2024-06-18 RX ORDER — SUCCINYLCHOLINE CHLORIDE 20 MG/ML
INJECTION INTRAMUSCULAR; INTRAVENOUS AS NEEDED
Status: DISCONTINUED | OUTPATIENT
Start: 2024-06-18 | End: 2024-06-18 | Stop reason: SURG

## 2024-06-18 RX ORDER — ROCURONIUM BROMIDE 10 MG/ML
INJECTION, SOLUTION INTRAVENOUS AS NEEDED
Status: DISCONTINUED | OUTPATIENT
Start: 2024-06-18 | End: 2024-06-18 | Stop reason: SURG

## 2024-06-18 RX ORDER — ONDANSETRON 2 MG/ML
INJECTION INTRAMUSCULAR; INTRAVENOUS AS NEEDED
Status: DISCONTINUED | OUTPATIENT
Start: 2024-06-18 | End: 2024-06-18 | Stop reason: SURG

## 2024-06-18 RX ORDER — PROPOFOL 10 MG/ML
INJECTION, EMULSION INTRAVENOUS AS NEEDED
Status: DISCONTINUED | OUTPATIENT
Start: 2024-06-18 | End: 2024-06-18 | Stop reason: SURG

## 2024-06-18 RX ORDER — DROPERIDOL 2.5 MG/ML
0.62 INJECTION, SOLUTION INTRAMUSCULAR; INTRAVENOUS ONCE AS NEEDED
Status: DISCONTINUED | OUTPATIENT
Start: 2024-06-18 | End: 2024-06-18 | Stop reason: HOSPADM

## 2024-06-18 RX ORDER — SODIUM CHLORIDE 9 MG/ML
9 INJECTION, SOLUTION INTRAVENOUS CONTINUOUS PRN
Status: DISCONTINUED | OUTPATIENT
Start: 2024-06-18 | End: 2024-06-18 | Stop reason: HOSPADM

## 2024-06-18 RX ORDER — DEXAMETHASONE SODIUM PHOSPHATE 4 MG/ML
INJECTION, SOLUTION INTRA-ARTICULAR; INTRALESIONAL; INTRAMUSCULAR; INTRAVENOUS; SOFT TISSUE AS NEEDED
Status: DISCONTINUED | OUTPATIENT
Start: 2024-06-18 | End: 2024-06-18 | Stop reason: SURG

## 2024-06-18 RX ORDER — ACETAMINOPHEN 325 MG/1
650 TABLET ORAL ONCE AS NEEDED
Status: DISCONTINUED | OUTPATIENT
Start: 2024-06-18 | End: 2024-06-18 | Stop reason: HOSPADM

## 2024-06-18 RX ORDER — FLUMAZENIL 0.1 MG/ML
0.2 INJECTION INTRAVENOUS AS NEEDED
Status: DISCONTINUED | OUTPATIENT
Start: 2024-06-18 | End: 2024-06-18 | Stop reason: HOSPADM

## 2024-06-18 RX ORDER — IPRATROPIUM BROMIDE AND ALBUTEROL SULFATE 2.5; .5 MG/3ML; MG/3ML
3 SOLUTION RESPIRATORY (INHALATION) ONCE AS NEEDED
Status: DISCONTINUED | OUTPATIENT
Start: 2024-06-18 | End: 2024-06-18 | Stop reason: HOSPADM

## 2024-06-18 RX ORDER — EPHEDRINE SULFATE 5 MG/ML
5 INJECTION INTRAVENOUS ONCE AS NEEDED
Status: DISCONTINUED | OUTPATIENT
Start: 2024-06-18 | End: 2024-06-18 | Stop reason: HOSPADM

## 2024-06-18 RX ORDER — HYDRALAZINE HYDROCHLORIDE 20 MG/ML
5 INJECTION INTRAMUSCULAR; INTRAVENOUS
Status: DISCONTINUED | OUTPATIENT
Start: 2024-06-18 | End: 2024-06-18 | Stop reason: HOSPADM

## 2024-06-18 RX ADMIN — ROCURONIUM BROMIDE 10 MG: 10 INJECTION, SOLUTION INTRAVENOUS at 14:56

## 2024-06-18 RX ADMIN — DEXAMETHASONE SODIUM PHOSPHATE 4 MG: 4 INJECTION, SOLUTION INTRAMUSCULAR; INTRAVENOUS at 15:03

## 2024-06-18 RX ADMIN — SUGAMMADEX 200 MG: 100 INJECTION, SOLUTION INTRAVENOUS at 15:41

## 2024-06-18 RX ADMIN — SUCCINYLCHOLINE CHLORIDE 100 MG: 20 INJECTION, SOLUTION INTRAMUSCULAR; INTRAVENOUS at 14:56

## 2024-06-18 RX ADMIN — SODIUM CHLORIDE: 9 INJECTION, SOLUTION INTRAVENOUS at 14:45

## 2024-06-18 RX ADMIN — ROCURONIUM BROMIDE 40 MG: 10 INJECTION, SOLUTION INTRAVENOUS at 15:03

## 2024-06-18 RX ADMIN — LIDOCAINE HYDROCHLORIDE 100 MG: 20 INJECTION, SOLUTION EPIDURAL; INFILTRATION; INTRACAUDAL; PERINEURAL at 14:56

## 2024-06-18 RX ADMIN — PROPOFOL INJECTABLE EMULSION 200 MG: 10 INJECTION, EMULSION INTRAVENOUS at 14:56

## 2024-06-18 RX ADMIN — ONDANSETRON 4 MG: 2 INJECTION INTRAMUSCULAR; INTRAVENOUS at 15:03

## 2024-06-18 RX ADMIN — PROPOFOL INJECTABLE EMULSION 150 MCG/KG/MIN: 10 INJECTION, EMULSION INTRAVENOUS at 14:59

## 2024-06-18 NOTE — DISCHARGE INSTRUCTIONS
Endoscopic ultrasound and Robotic bronchoscopy with fine needle aspiration    Samples (biopsies) of the lymph nodes are taken from inside the lungs and sent for diagnostic testing.    Final results of your biopsy take up to 5 business days to process; your endoscopic physician will contact you with your results.    EBUS and robotic bronchoscopy is recommended in the following instances:  To diagnose different types of lung disorders (such as sarcoidosis or tuberculosis)  To diagnose or 'stage' cancer   To investigate enlarged lymph  nodes in the chest    Due to effects of sedation, do not drive or operate heavy machinery for 24 hours.    Avoid heavy lifting (>10 lbs.) or strenuous activity for 48 hours.    Continue to avoid non-steroidal anti-inflammatory medications (NSAIDS) for 5-7 days after the procedure unless told otherwise by your endoscopic and primary care physicians.    Some patients have a temporary sore throat after the procedure; this is a normal finding and over-the-counter lozenges help soothe symptoms.    You may resume normal diet once you are discharged.     Avoid red-colored foods for 24 hours.     You may resume your blood thinner medications (if applicable) as directed by your endoscopic and primary care physicians.    It is normal to have a very small amount of blood-tinged sputum immediately after the procedure. This should resolve within 3-4 days.    Although complications are rare, there is a small risk of pain, collapsed lung, bleeding and infection. Contact your endoscopic physician if you have these signs or symptoms (Dr. Smith):  Temperature greater than 102°F  Worsening pain not relieved by medications  Go to your nearest emergency room is you experience:  Shaking, chills or a temperature over 102°F.    New, sudden difficulty breathing.    New pain when taking a deep breath.    New, sudden chest pain.  Worsening cough that produces large amounts of blood.

## 2024-06-18 NOTE — ANESTHESIA PROCEDURE NOTES
Airway  Urgency: elective    Date/Time: 6/18/2024 2:59 PM  Airway not difficult    General Information and Staff    Patient location during procedure: OR  Anesthesiologist: Ernesto Ga MD  CRNA/CAA: Yeni Mcgrath CRNA    Indications and Patient Condition  Indications for airway management: airway protection    Preoxygenated: yes (pt pre-O2 with 100% O2)  Mask difficulty assessment: 0 - not attempted    Final Airway Details  Final airway type: endotracheal airway      Successful airway: ETT  Cuffed: yes   Successful intubation technique: video laryngoscopy and RSI  Facilitating devices/methods: intubating stylet  Endotracheal tube insertion site: oral  Blade: Verduzco  Blade size: 3  ETT size (mm): 8.5  Cormack-Lehane Classification: grade I - full view of glottis  Placement verified by: chest auscultation and capnometry   Cuff volume (mL): 7  Measured from: lips  ETT/EBT  to lips (cm): 22  Number of attempts at approach: 1  Assessment: lips, teeth, and gum same as pre-op and atraumatic intubation    Additional Comments  ATOETx1. No change in dentition.

## 2024-06-18 NOTE — ANESTHESIA POSTPROCEDURE EVALUATION
Patient: Jeanne King    Procedure Summary       Date: 06/18/24 Room / Location: [facility] ENDOSCOPY 3 / [facility] ENDOSCOPY    Anesthesia Start: 1445 Anesthesia Stop: 1556    Procedure: BRONCHOSCOPY NAVIGATION WITH ENDOBRONCHIAL ULTRASOUND with fine needle aspiration of lymph node x2  AND ION ROBOT with fine needle aspiration of right lower lobe lung mass;cryo tissue biopsy of right lower lobe lung mass (Bronchus) Diagnosis:       Lung mass      (Lung mass [R91.8])    Surgeons: Rene Smith MD Provider: Ernesto Ga MD    Anesthesia Type: general ASA Status: 4            Anesthesia Type: general    Vitals  Vitals Value Taken Time   /102 06/18/24 1620   Temp     Pulse 62 06/18/24 1635   Resp 13 06/18/24 1620   SpO2 92 % 06/18/24 1635   Vitals shown include unfiled device data.        Post Anesthesia Care and Evaluation    Patient location during evaluation: PACU  Patient participation: complete - patient participated  Level of consciousness: awake  Pain scale: See nurse's notes for pain score.  Pain management: adequate    Airway patency: patent  Anesthetic complications: No anesthetic complications  PONV Status: none  Cardiovascular status: acceptable  Respiratory status: acceptable and spontaneous ventilation  Hydration status: acceptable    Comments: Patient seen and examined postoperatively; vital signs stable; SpO2 greater than or equal to 90%; cardiopulmonary status stable; nausea/vomiting adequately controlled; pain adequately controlled; no apparent anesthesia complications; patient discharged from anesthesia care when discharge criteria were met

## 2024-06-19 ENCOUNTER — PATIENT OUTREACH (OUTPATIENT)
Dept: ONCOLOGY | Facility: CLINIC | Age: 64
End: 2024-06-19
Payer: MEDICARE

## 2024-06-19 LAB
CITATION REF LAB TEST: NORMAL
LAB DIRECTOR NAME PROVIDER: NORMAL
MPL GENE MUT TESTED BLD/T: NORMAL
MPL P.W515L+W515K+S505N BLD/T QL: NORMAL
SERVICE CMNT-IMP: NORMAL

## 2024-06-19 NOTE — SIGNIFICANT NOTE
Per Dr. Smith message referral to radiation oncology. Patient is not a surgical candidate. Patient scheduled with rad onc 6/21 at 1130. Daughter aware of date time and location and just a consult. She has my direct number for any questions or concerns.

## 2024-06-20 NOTE — PROGRESS NOTES
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 female with a history of COPD, Bipolar II, now former smoker who underwent CT Chest after presenting to the ED with lesions on her leg, noted in ED to have hypoxemia. CT chest found a 2.4cm lesion in the right lung with some enlarged mediastinal lymph nodes. She is now on 2-3 L NC during the day and at night. The patient was taken for initial biopsy on 5/10/2024. The patient had subcarinal and right hilar LN's samples, which were negative for malignancy. The primary was not sampled at that time. The patient underwent PET CT on 6/6/2024 which demonstrated avidity in the primary lesion without avidity elsewhere in the hilum or mediastinum. The patient was taken back for biopsy of the primary on 6/18/2024. Biopsy of the primary demonstrated adenocarcinoma. Samples of 4R and 11R were negative. The patient has been referred for consideration of SBRT.         Imaging:      CT Chest Without Contrast 6/6/2024 ( [facility])  Impression:   1. 2.4 x 1.8 cm spiculated mass traversing the right major fissure involving the posterior right upper lobe and superior right lower lobe, is consistent with the appearance of malignancy.  2. Enlarged mediastinal lymph nodes as described above, nonspecific. Metastatic adenopathy cannot be excluded.  3. Moderate cardiomegaly with features of mild basilar interstitial edema.  4. Partial atelectasis lingular segment left upper lobe  5. Central pulmonary arteries appear dilated. Correlate for pulmonary hypertension  6. Bilateral adrenal nodules  favored to represent benign adenomas.      PET/CT 6/6/2024 ([facility])  Impression:  1. Hypermetabolic 2.8 cm spiculated nodule in the superior segment of the right lower lobe which involves the pulmonary fissure most consistent with primary pulmonary malignancy.  2. No hypermetabolic adenopathy or findings of distant metastasis.  3. Marked cardiomegaly with extensive coronary artery calcifications.  4. Additional chronic findings above.        Pathology:      FNA 5/10/2024 ([facility])  Final Diagnosis   Specimen #1 and #2 (Lymph node, subcarinal, fine-needle aspiration, smears and cell block preparation):    Anthracotic lymphoid tissue, bronchial cells and cartilage, see comment    No evidence of malignancy     Specimen #3 and #4 (Lymph node, right hilar, fine-needle aspiration, smears and cell block preparation):    Benign bronchial cells and rare lymphocytes    No evidence of malignancy       Comment    Immunohistochemistry was performed utilizing appropriate controls on specimen #2.  CD45 is positive and CD56 is negative which confirms lymphoid tissue and excludes small cell carcinoma.  Please see the gross description of specimen #4 as no tissue was identified in the cellblock.       Cytology 5/10/2024 ([facility])  Final Diagnosis   Lung, right upper lobe, bronchoalveolar lavage, smears and cytospin preparation:    Benign bronchial cells and reactive bronchial cells    Abundant pulmonary macrophages present    Moderate acute and chronic inflammation and mucus    No fungal organisms identified    Negative for malignant cells         FNA and Tissue Pathology 6/18/2024 ([facility])    Final Diagnosis   Lung, right lower lobe, Ion guided biopsy:    Invasive moderately differentiated pulmonary adenocarcinoma, see comment     Comment    Immunohistochemistry was performed utilizing appropriate controls and the tumor is positive for CK7, TTF-1 and Napsin A.  The staining pattern confirms the lesion as pulmonary  adenocarcinoma.     Final Diagnosis   Specimen #1 and #4 (Lung, right lower lobe, Ion guided fine-needle aspiration, smears and cell block preparation):    Positive for malignancy, non-small cell carcinoma     Specimen #2 (Lymph node, 11 R, fine-needle aspiration, cell block preparation only):    No tissue identified, see gross description     Specimen #3 (lymph node, 4R, fine-needle aspiration, cell block preparation):    Bronchial cells, histiocytes and lymphoid tissue    Negative for metastatic carcinoma       Labs:    Lab Results   Component Value Date    CREATININE 0.63 06/18/2024       CMP          5/12/2024    04:15 5/13/2024    13:49 6/18/2024    10:41   CMP   Glucose 116  128  109    BUN 28  22  12    Creatinine 0.64  0.61  0.63    EGFR 98.2  99.4  98.6    Sodium 136  138  143    Potassium 4.5  4.0  3.8    Chloride 90  93  101    Calcium 9.5  9.6  9.4    Total Protein  6.4     Albumin  3.7     Globulin  2.7     Total Bilirubin  2.7     Alkaline Phosphatase  91     AST (SGOT)  15     ALT (SGPT)  51     Albumin/Globulin Ratio  1.4     BUN/Creatinine Ratio 43.8  36.1  19.0    Anion Gap 6.0  7.0  7.1      CBC          5/11/2024    03:00 5/11/2024    04:14 5/12/2024    04:15 6/11/2024    14:20   CBC   WBC  8.42  10.66  8.37    RBC  5.97  6.11  5.84    Hemoglobin 20.6  17.5  17.8  16.5    Hematocrit 61  58.4  58.7  53.5    MCV  97.8  96.1  91.6    MCH  29.3  29.1  28.3    MCHC  30.0  30.3  30.8    RDW  15.8  15.6  16.2    Platelets  105  102  115              Problem List:  Patient Active Problem List   Diagnosis    Acute hypoxic respiratory failure    Lung mass    Chronic obstructive pulmonary disease with acute exacerbation    Bipolar II disorder    Bronchitis    Cat bite of left hand    Chronic sinusitis    Depressive disorder    Hypoxemia    Pulmonary edema    Current smoker          Medications:  Current Outpatient Medications on File Prior to Visit   Medication Sig Dispense Refill    acetaZOLAMIDE (DIAMOX)  250 MG tablet Take 1 tablet by mouth Daily. 30 tablet 0    albuterol sulfate  (90 Base) MCG/ACT inhaler Inhale 2 puffs Every 4 (Four) Hours As Needed for Wheezing.      ALPRAZolam (XANAX) 1 MG tablet Take 1 tablet by mouth 3 (Three) Times a Day As Needed for Anxiety.      budesonide (PULMICORT) 0.5 MG/2ML nebulizer solution Take 2 mL by nebulization 2 (Two) Times a Day. 120 mL 0    FLUoxetine (PROzac) 10 MG capsule Take 1 capsule by mouth Daily.      FLUoxetine (PROzac) 20 MG capsule Take 1 capsule by mouth Daily.      folic acid (FOLVITE) 1 MG tablet Take 1 tablet by mouth Daily. 30 tablet 0    furosemide (LASIX) 40 MG tablet Take 1 tablet by mouth Daily. 30 tablet 0    ipratropium-albuterol (DUO-NEB) 0.5-2.5 mg/3 ml nebulizer Take 3 mL by nebulization Every 4 (Four) Hours As Needed for Shortness of Air or Wheezing. Copd j44.9 360 mL 3    Magnesium Oxide -Mg Supplement 500 MG tablet Take 1 tablet by mouth Daily.      metoprolol succinate XL (TOPROL-XL) 25 MG 24 hr tablet Take 1 tablet by mouth Every 12 (Twelve) Hours. 60 tablet 0    Potassium Gluconate 550 (90 K) MG tablet 595 mg.      sennosides-docusate (PERICOLACE) 8.6-50 MG per tablet Take 2 tablets by mouth 2 (Two) Times a Day As Needed for Constipation. 30 tablet 0    [DISCONTINUED] amLODIPine (NORVASC) 5 MG tablet Take 1 tablet by mouth Daily.      [DISCONTINUED] aspirin 81 MG EC tablet 1 tablet.      [DISCONTINUED] Magnesium Oxide, Antacid, 500 MG capsule Take  by mouth.       No current facility-administered medications on file prior to visit.          Allergies:  Allergies   Allergen Reactions    Hydrocodone Unknown - Low Severity    Keflex [Cephalexin] Mental Status Change and Provider Review Needed    Morphine Mental Status Change    Premarin [Conjugated Estrogens] Mental Status Change         Family History:  The patient has no family history of conditions which would be contraindications to radiation therapy  Half-Brother SCLC  Brother -  "HCC  Sister - Cervical/Uterine    Social History:  Recently quit smoking    Distance From Clinic: 1-2 hours    Patient has someone who can assist with transportation: yes      Review of Systems:    Review of Systems   HENT:  Positive for sinus pressure.    Respiratory:  Positive for shortness of breath.    Musculoskeletal:  Positive for neck pain.   Neurological:  Positive for headaches.         Vital Signs:  /83   Pulse 68   Temp 98.2 °F (36.8 °C) (Oral)   Resp 20   Ht 172.7 cm (68\")   Wt 96.1 kg (211 lb 12.8 oz)   SpO2 94%   BMI 32.20 kg/m²   Estimated body mass index is 32.2 kg/m² as calculated from the following:    Height as of this encounter: 172.7 cm (68\").    Weight as of this encounter: 96.1 kg (211 lb 12.8 oz).  Pain Score    06/21/24 1134   PainSc: 0-No pain         ECOG: Ambulatory and capable of all selfcare but unable to carry out any work activities; up and about more than 50% of waking hours = 2    Physical Exam  Vitals reviewed.   Constitutional:       General: She is not in acute distress.     Appearance: Normal appearance.   HENT:      Head: Normocephalic and atraumatic.   Eyes:      Extraocular Movements: Extraocular movements intact.      Pupils: Pupils are equal, round, and reactive to light.   Pulmonary:      Effort: Pulmonary effort is normal.   Abdominal:      General: Abdomen is flat.      Palpations: Abdomen is soft.   Musculoskeletal:      Cervical back: Normal range of motion.   Skin:     General: Skin is warm and dry.   Neurological:      General: No focal deficit present.      Mental Status: She is alert and oriented to person, place, and time.   Psychiatric:         Mood and Affect: Mood normal.         Behavior: Behavior normal.          Result Review :  The following data was reviewed by: Stevie Soto MD on 06/21/2024:  Labs: Last Creatinine, CBC, CMP  Data reviewed : Radiologic studies CT Chest, PET CT             Diagnoses and all orders for this visit:    1. " Malignant neoplasm of upper lobe of right lung (Primary)        Assessment:    Jeanne King is a 64 y.o. female with a history of COPD, Bipolar II, now with biopsy proven adenocarcinoma of the RUL. The patient has negative hilar and mediastinal nodes on PET CT and on biopsy pathology. She has been referred for consideration of SBRT.    I met with the patient and discussed the risks, benefits, side effects, and logistics of radiation treatment planning and delivery. I answered all of the patient's questions to her satisfaction. At the end of our conversation the patient was amenable to pursuing radiation therapy. We will have the patient return next week for CT simulation and radiation planning.       Plan:    -Plan on definitive radiation therapy for biopsy proven adenocarcinoma of the RLL, OS IA3. CT simulation next week.        I spent 60 minutes caring for Jeanne on this date of service. This time includes time spent by me in the following activities:preparing for the visit, reviewing tests, obtaining and/or reviewing a separately obtained history, referring and communicating with other health care professionals , documenting information in the medical record, independently interpreting results and communicating that information with the patient/family/caregiver, and care coordination  Follow Up   No follow-ups on file.  Patient was given instructions and counseling regarding her condition or for health maintenance advice. Please see specific information pulled into the AVS if appropriate.     Stevie Soto MD

## 2024-06-21 ENCOUNTER — CONSULT (OUTPATIENT)
Dept: RADIATION ONCOLOGY | Facility: HOSPITAL | Age: 64
End: 2024-06-21
Payer: MEDICARE

## 2024-06-21 VITALS
DIASTOLIC BLOOD PRESSURE: 83 MMHG | HEART RATE: 68 BPM | RESPIRATION RATE: 20 BRPM | SYSTOLIC BLOOD PRESSURE: 149 MMHG | TEMPERATURE: 98.2 F | BODY MASS INDEX: 32.1 KG/M2 | HEIGHT: 68 IN | WEIGHT: 211.8 LBS | OXYGEN SATURATION: 94 %

## 2024-06-21 DIAGNOSIS — C34.11 MALIGNANT NEOPLASM OF UPPER LOBE OF RIGHT LUNG: Primary | ICD-10-CM

## 2024-06-21 LAB
BEAKER LAB AP INTRAOPERATIVE CONSULTATION: NORMAL
LAB AP CASE REPORT: NORMAL
LAB AP CLINICAL INFORMATION: NORMAL
LAB AP DIAGNOSIS COMMENT: NORMAL
LAB AP DIAGNOSIS COMMENT: NORMAL
MYCOBACTERIUM SPEC CULT: NORMAL
NIGHT BLUE STAIN TISS: NORMAL
PATH REPORT.FINAL DX SPEC: NORMAL
PATH REPORT.GROSS SPEC: NORMAL

## 2024-06-21 PROCEDURE — G0463 HOSPITAL OUTPT CLINIC VISIT: HCPCS | Performed by: STUDENT IN AN ORGANIZED HEALTH CARE EDUCATION/TRAINING PROGRAM

## 2024-06-21 NOTE — PROGRESS NOTES
THORACIC SURGERY CLINIC CONSULT NOTE    You have chosen to receive care through a telehealth visit.  Do you consent to use a video/audio connection for your medical care today? Yes    REASON FOR CONSULT: Right lower lobe spiculated mass    REFERRING PROVIDER: Dr. Castillo    Subjective   HISTORY OF PRESENTING ILLNESS:   Jeanne King is a 64 y.o. female who has significant medical problems as mentioned in the medical chart.     She reported having shortness of breath and cough that she attributed to lingering effects of COVID infection and did not seek medical treatment. A CT PE protocol was performed at the outlying facility and was negative for pulmonary embolism, did show a right lower lobe spiculated mass lesion measuring 2.3 cm and a more distal lesion measuring 1.7 cm along with mediastinal lymphadenopathy with a right hilar node measuring 2.1 cm. Upper abdomen showed a cirrhotic enlarged liver hence splenomegaly along with abdominal ascites.     She has been found to have right lower lobe spiculated mass measuring 2.3 cm, another mass that measures 1.7 cm with mediastinal lymphadenopathy.  There is also evidence of cirrhosis of the liver with portal hypertension.  We have been asked to see patient due to lung lesions.     5/10/2024 patient had right hilar node and subcarinal lymph node with no evidence of malignancy.  The right upper lobe BAL was negative for malignancy.  6/8/2024: Patient had a PET CT scan which basically revealed no hypermetabolic adenopathy in the neck.  Within the superior segment of the right lower lobe extending to involve the right pulmonary fissure there is a 2.5 cm x 2.8 cm hypermetabolic with SUV of 8.6 consistent with malignancy.  There is no hypermetabolic mediastinal or hilar adenopathy.  No additional hypermetabolic pulmonary nodule was seen.  Dilated main pulmonary artery measuring up to 4.2 cm seen in pulmonary hypertension severely mildly enlarged mediastinal lymph nodes  again noted but not hypermetabolic.  There is a right paratracheal node that measures 1.4 cm.  She has bilateral adrenal adenomas.  There is no hypermetabolic bone lesions to indicate metastatic disease.    She was referred to thoracic surgery for further evaluation.    Past Medical History:   Diagnosis Date    COPD (chronic obstructive pulmonary disease)     Hypertension        Past Surgical History:   Procedure Laterality Date    BRONCHOSCOPY N/A 5/10/2024    Procedure: BRONCHOSCOPY WITH BRONCHOALVEOLAR LAVAGE AND ENDOBRONCHIAL ULTRASOUND WITH FINE NEEDLE ASPIRATION x2;  Surgeon: Bessy Mcguire MD;  Location: [facility] ENDOSCOPY;  Service: Pulmonary;  Laterality: N/A;  lung mass    BRONCHOSCOPY WITH ION ROBOTIC ASSIST N/A 6/18/2024    Procedure: BRONCHOSCOPY NAVIGATION WITH ENDOBRONCHIAL ULTRASOUND with fine needle aspiration of lymph node x2  AND ION ROBOT with fine needle aspiration of right lower lobe lung mass;cryo tissue biopsy of right lower lobe lung mass;  Surgeon: Rene Smith MD;  Location: [facility] ENDOSCOPY;  Service: Robotics - Pulmonary;  Laterality: N/A;  right lower lobe lung mass    HYSTERECTOMY         Family History   Problem Relation Age of Onset    No Known Problems Mother     No Known Problems Father     No Known Problems Sister     No Known Problems Brother        Social History     Socioeconomic History    Marital status:    Tobacco Use    Smoking status: Former     Current packs/day: 1.50     Average packs/day: 1.5 packs/day for 53.5 years (80.2 ttl pk-yrs)     Types: Cigarettes     Start date: 1971    Smokeless tobacco: Never   Vaping Use    Vaping status: Never Used   Substance and Sexual Activity    Alcohol use: Never    Drug use: Never    Sexual activity: Defer         Current Outpatient Medications:     acetaZOLAMIDE (DIAMOX) 250 MG tablet, Take 1 tablet by mouth Daily., Disp: 30 tablet, Rfl: 0    albuterol sulfate  (90 Base) MCG/ACT inhaler, Inhale 2 puffs Every 4 (Four)  Hours As Needed for Wheezing., Disp: , Rfl:     ALPRAZolam (XANAX) 1 MG tablet, Take 1 tablet by mouth 3 (Three) Times a Day As Needed for Anxiety., Disp: , Rfl:     amLODIPine (NORVASC) 5 MG tablet, Take 1 tablet by mouth Daily., Disp: , Rfl:     aspirin 81 MG EC tablet, 1 tablet., Disp: , Rfl:     budesonide (PULMICORT) 0.5 MG/2ML nebulizer solution, Take 2 mL by nebulization 2 (Two) Times a Day., Disp: 120 mL, Rfl: 0    FLUoxetine (PROzac) 10 MG capsule, Take 1 capsule by mouth Daily., Disp: , Rfl:     FLUoxetine (PROzac) 20 MG capsule, Take 1 capsule by mouth Daily., Disp: , Rfl:     folic acid (FOLVITE) 1 MG tablet, Take 1 tablet by mouth Daily., Disp: 30 tablet, Rfl: 0    furosemide (LASIX) 40 MG tablet, Take 1 tablet by mouth Daily., Disp: 30 tablet, Rfl: 0    ipratropium-albuterol (DUO-NEB) 0.5-2.5 mg/3 ml nebulizer, Take 3 mL by nebulization Every 4 (Four) Hours As Needed for Shortness of Air or Wheezing. Copd j44.9, Disp: 360 mL, Rfl: 3    Magnesium Oxide -Mg Supplement 500 MG tablet, Take 1 tablet by mouth Daily., Disp: , Rfl:     Magnesium Oxide, Antacid, 500 MG capsule, Take  by mouth., Disp: , Rfl:     metoprolol succinate XL (TOPROL-XL) 25 MG 24 hr tablet, Take 1 tablet by mouth Every 12 (Twelve) Hours., Disp: 60 tablet, Rfl: 0    Potassium Gluconate 550 (90 K) MG tablet, 595 mg., Disp: , Rfl:     sennosides-docusate (PERICOLACE) 8.6-50 MG per tablet, Take 2 tablets by mouth 2 (Two) Times a Day As Needed for Constipation., Disp: 30 tablet, Rfl: 0     Allergies   Allergen Reactions    Hydrocodone Unknown - Low Severity    Keflex [Cephalexin] Mental Status Change and Provider Review Needed    Morphine Mental Status Change    Premarin [Conjugated Estrogens] Mental Status Change             Objective    OBJECTIVE:     VITAL SIGNS:  There were no vitals taken for this visit.    PHYSICAL EXAM:  Not performed due to the nature of the visit.    LAB RESULTS:  I have reviewed all the available laboratory  results in the chart.    RESULTS REVIEW:  I have reviewed the patient's all relevant laboratory and imaging findings.          ASSESSMENT & PLAN:  Jeanne King is a 64 y.o. female with significant medical conditions as mentioned above presented to my clinic.    Diagnosis: 2.8 cm hypermetabolic right lower lobe spiculated nodule  Staging: Stage I    The appearance and PET activity of the right lower lobe spiculated nodule is concerning for primary lung malignancy.  If proven to be cancer, she will not be a candidate for surgical intervention due to her significant comorbid conditions.  She will be scheduled for ION robotic navigational bronchoscopy and biopsy.    I discussed the patients findings and my recommendations with the patient. The patient was given adequate time to ask questions and all questions were answered to patient satisfaction. Thank you for this consult and allowing us to participate in the care of your patient.      Rene Smith MD  Thoracic Surgeon  [facility]        Dictated utilizing Dragon dictation    I spent 60 minutes caring for Jeanne on this date of service. This time includes time spent by me in the following activities:preparing for the visit, reviewing tests, obtaining and/or reviewing a separately obtained history, performing a medically appropriate examination and/or evaluation , counseling and educating the patient/family/caregiver, ordering medications, tests, or procedures, referring and communicating with other health care professionals , documenting information in the medical record, independently interpreting results and communicating that information with the patient/family/caregiver, and care coordination and more than half the time was spent in direct face to face evaluation and decision making.

## 2024-06-21 NOTE — H&P (VIEW-ONLY)
THORACIC SURGERY CLINIC CONSULT NOTE    You have chosen to receive care through a telehealth visit.  Do you consent to use a video/audio connection for your medical care today? Yes    REASON FOR CONSULT: Right lower lobe spiculated mass    REFERRING PROVIDER: Dr. Castillo    Subjective   HISTORY OF PRESENTING ILLNESS:   Jeanne King is a 64 y.o. female who has significant medical problems as mentioned in the medical chart.     She reported having shortness of breath and cough that she attributed to lingering effects of COVID infection and did not seek medical treatment. A CT PE protocol was performed at the outlying facility and was negative for pulmonary embolism, did show a right lower lobe spiculated mass lesion measuring 2.3 cm and a more distal lesion measuring 1.7 cm along with mediastinal lymphadenopathy with a right hilar node measuring 2.1 cm. Upper abdomen showed a cirrhotic enlarged liver hence splenomegaly along with abdominal ascites.     She has been found to have right lower lobe spiculated mass measuring 2.3 cm, another mass that measures 1.7 cm with mediastinal lymphadenopathy.  There is also evidence of cirrhosis of the liver with portal hypertension.  We have been asked to see patient due to lung lesions.     5/10/2024 patient had right hilar node and subcarinal lymph node with no evidence of malignancy.  The right upper lobe BAL was negative for malignancy.  6/8/2024: Patient had a PET CT scan which basically revealed no hypermetabolic adenopathy in the neck.  Within the superior segment of the right lower lobe extending to involve the right pulmonary fissure there is a 2.5 cm x 2.8 cm hypermetabolic with SUV of 8.6 consistent with malignancy.  There is no hypermetabolic mediastinal or hilar adenopathy.  No additional hypermetabolic pulmonary nodule was seen.  Dilated main pulmonary artery measuring up to 4.2 cm seen in pulmonary hypertension severely mildly enlarged mediastinal lymph nodes  again noted but not hypermetabolic.  There is a right paratracheal node that measures 1.4 cm.  She has bilateral adrenal adenomas.  There is no hypermetabolic bone lesions to indicate metastatic disease.    She was referred to thoracic surgery for further evaluation.    Past Medical History:   Diagnosis Date    COPD (chronic obstructive pulmonary disease)     Hypertension        Past Surgical History:   Procedure Laterality Date    BRONCHOSCOPY N/A 5/10/2024    Procedure: BRONCHOSCOPY WITH BRONCHOALVEOLAR LAVAGE AND ENDOBRONCHIAL ULTRASOUND WITH FINE NEEDLE ASPIRATION x2;  Surgeon: Bessy Mcguire MD;  Location: [facility] ENDOSCOPY;  Service: Pulmonary;  Laterality: N/A;  lung mass    BRONCHOSCOPY WITH ION ROBOTIC ASSIST N/A 6/18/2024    Procedure: BRONCHOSCOPY NAVIGATION WITH ENDOBRONCHIAL ULTRASOUND with fine needle aspiration of lymph node x2  AND ION ROBOT with fine needle aspiration of right lower lobe lung mass;cryo tissue biopsy of right lower lobe lung mass;  Surgeon: Rene Smith MD;  Location: [facility] ENDOSCOPY;  Service: Robotics - Pulmonary;  Laterality: N/A;  right lower lobe lung mass    HYSTERECTOMY         Family History   Problem Relation Age of Onset    No Known Problems Mother     No Known Problems Father     No Known Problems Sister     No Known Problems Brother        Social History     Socioeconomic History    Marital status:    Tobacco Use    Smoking status: Former     Current packs/day: 1.50     Average packs/day: 1.5 packs/day for 53.5 years (80.2 ttl pk-yrs)     Types: Cigarettes     Start date: 1971    Smokeless tobacco: Never   Vaping Use    Vaping status: Never Used   Substance and Sexual Activity    Alcohol use: Never    Drug use: Never    Sexual activity: Defer         Current Outpatient Medications:     acetaZOLAMIDE (DIAMOX) 250 MG tablet, Take 1 tablet by mouth Daily., Disp: 30 tablet, Rfl: 0    albuterol sulfate  (90 Base) MCG/ACT inhaler, Inhale 2 puffs Every 4 (Four)  Hours As Needed for Wheezing., Disp: , Rfl:     ALPRAZolam (XANAX) 1 MG tablet, Take 1 tablet by mouth 3 (Three) Times a Day As Needed for Anxiety., Disp: , Rfl:     amLODIPine (NORVASC) 5 MG tablet, Take 1 tablet by mouth Daily., Disp: , Rfl:     aspirin 81 MG EC tablet, 1 tablet., Disp: , Rfl:     budesonide (PULMICORT) 0.5 MG/2ML nebulizer solution, Take 2 mL by nebulization 2 (Two) Times a Day., Disp: 120 mL, Rfl: 0    FLUoxetine (PROzac) 10 MG capsule, Take 1 capsule by mouth Daily., Disp: , Rfl:     FLUoxetine (PROzac) 20 MG capsule, Take 1 capsule by mouth Daily., Disp: , Rfl:     folic acid (FOLVITE) 1 MG tablet, Take 1 tablet by mouth Daily., Disp: 30 tablet, Rfl: 0    furosemide (LASIX) 40 MG tablet, Take 1 tablet by mouth Daily., Disp: 30 tablet, Rfl: 0    ipratropium-albuterol (DUO-NEB) 0.5-2.5 mg/3 ml nebulizer, Take 3 mL by nebulization Every 4 (Four) Hours As Needed for Shortness of Air or Wheezing. Copd j44.9, Disp: 360 mL, Rfl: 3    Magnesium Oxide -Mg Supplement 500 MG tablet, Take 1 tablet by mouth Daily., Disp: , Rfl:     Magnesium Oxide, Antacid, 500 MG capsule, Take  by mouth., Disp: , Rfl:     metoprolol succinate XL (TOPROL-XL) 25 MG 24 hr tablet, Take 1 tablet by mouth Every 12 (Twelve) Hours., Disp: 60 tablet, Rfl: 0    Potassium Gluconate 550 (90 K) MG tablet, 595 mg., Disp: , Rfl:     sennosides-docusate (PERICOLACE) 8.6-50 MG per tablet, Take 2 tablets by mouth 2 (Two) Times a Day As Needed for Constipation., Disp: 30 tablet, Rfl: 0     Allergies   Allergen Reactions    Hydrocodone Unknown - Low Severity    Keflex [Cephalexin] Mental Status Change and Provider Review Needed    Morphine Mental Status Change    Premarin [Conjugated Estrogens] Mental Status Change             Objective    OBJECTIVE:     VITAL SIGNS:  There were no vitals taken for this visit.    PHYSICAL EXAM:  Not performed due to the nature of the visit.    LAB RESULTS:  I have reviewed all the available laboratory  results in the chart.    RESULTS REVIEW:  I have reviewed the patient's all relevant laboratory and imaging findings.          ASSESSMENT & PLAN:  Jeanne King is a 64 y.o. female with significant medical conditions as mentioned above presented to my clinic.    Diagnosis: 2.8 cm hypermetabolic right lower lobe spiculated nodule  Staging: Stage I    The appearance and PET activity of the right lower lobe spiculated nodule is concerning for primary lung malignancy.  If proven to be cancer, she will not be a candidate for surgical intervention due to her significant comorbid conditions.  She will be scheduled for ION robotic navigational bronchoscopy and biopsy.    I discussed the patients findings and my recommendations with the patient. The patient was given adequate time to ask questions and all questions were answered to patient satisfaction. Thank you for this consult and allowing us to participate in the care of your patient.      Rene Smith MD  Thoracic Surgeon  [facility] [place]        Dictated utilizing Dragon dictation    I spent 60 minutes caring for Jeanne on this date of service. This time includes time spent by me in the following activities:preparing for the visit, reviewing tests, obtaining and/or reviewing a separately obtained history, performing a medically appropriate examination and/or evaluation , counseling and educating the patient/family/caregiver, ordering medications, tests, or procedures, referring and communicating with other health care professionals , documenting information in the medical record, independently interpreting results and communicating that information with the patient/family/caregiver, and care coordination and more than half the time was spent in direct face to face evaluation and decision making.

## 2024-06-21 NOTE — OP NOTE
Operative Note     Date of procedure: 6/20/2024     Patient name: Jeanne King  MRN: [id number]    Pre OP diagnosis:  Patient Active Problem List   Diagnosis    Acute hypoxic respiratory failure    Lung mass    Chronic obstructive pulmonary disease with acute exacerbation    Bipolar II disorder    Bronchitis    Cat bite of left hand    Chronic sinusitis    Depressive disorder    Hypoxemia    Pulmonary edema    Current smoker       Post OP diagnosis:  Patient Active Problem List   Diagnosis    Acute hypoxic respiratory failure    Lung mass    Chronic obstructive pulmonary disease with acute exacerbation    Bipolar II disorder    Bronchitis    Cat bite of left hand    Chronic sinusitis    Depressive disorder    Hypoxemia    Pulmonary edema    Current smoker       Procedure performed:   Flexible bronchoscopy.  Navigational bronchoscopy with ION robot with C-arm.  Interpretation of fluoroscopy images.  Radial endobronchial ultrasound.  Fine-needle aspiration of the right lower lobe nodule.  Cryo biopsy of the the right lower lobe nodule using 1.1 mm ERBECRYO®  cryoprobe.  Right lower lobe segmental bronchoalveolar lavage.  Endobronchial ultrasound for mediastinal staging.  Fine-needle aspiration of level 4R lymph node.  Final aspiration of level 11 R lymph node.    ION Synoptic report:  Date of radiological diagnosis: 5/8/2024  Lesions biopsied: Single  Tumor size: 28 mm  Location: Right Lower Lobe  Peripheral 1/3: Yes  Appearance: Solid  PET avidity: Yes  Bronchial sign: Yes  Prior biopsy: No  Radial EBUS: Concentric  Tools utilized: 23-Gauge Needle  NORMA result: Positive for non-small cell lung cancer.  EBUS performed: Yes    Indications:   Jeanne King is a 64-year-old female was found to have 2.8 cm PET avid right lower lobe nodule.  The appearance was concerning for malignancy and ION robotic navigational bronchoscopy was recommended to establish diagnosis.       Surgeon: Rene Smith MD     Assistants: No  qualified assistant was available for this procedure.      Anesthesia: General endotracheal anesthesia    ASA Class: 3    Procedure Details   On 6/20/2024, the patient was brought to the operating room and placed in the supine position on the operating room table. Following an uneventful induction of general anesthesia, patient was intubated with a single endotracheal tube without incident.  Antibiotic for surgical prophylaxis was not indicated due to the nature of the procedure.  Prior to beginning the operation, a time-out was conducted with all members of surgical team present. The patient was identified as Jeanne King, the procedure and the correct site were verified.     I began by performing flexible bronchoscopy.  A flexible adult bronchoscope was advanced through the endotracheal tube.  A complete examination of the distal trachea and bilateral mainstem and lobar bronchi and all segmental bronchial orifices was performed.  The patient has normal endobronchial anatomy.  All the tracheobronchial tree had moderate mucus secretion.  There was no blood, endoluminal lesions or other abnormal findings.  The bronchoscope was removed.    Prior to the procedure, the patient CT scan was integrated into the PlanPoint interface that generated the patient's 3D airway tree.  The target nodule was identified and its anatomical borders were mapped.  A path to the target nodule was generated through the PlanPoint interface.  After the plan was finalized, the patient image and plan guide was transferred to the [employer] robotic platform.  Using the Ion's controller, the 3.5 mm robotic catheter was advanced with the Ion's vision probe and navigated to the target along the preplanned path.  The catheter was parked next to the target lesion.  The Ion vision probe was removed and radial EBUS was used to confirm the presence of the target lesion.  The radial EBUS was removed and under fluoroscopic guidance, a 23 gauge Ion’s Flexision  needle was passed into the robotic catheter.  The needle was advanced into the target lesion and the location of the needle was confirmed with the C-arm. Multiple passes of the needle were made into the target lesion and the aspirate was sent for Rapid on Site Evaluation (NORMA). The needle was removed. I then performed cryo biopsy using 1.1 mm ERBECRYO®  cryoprobe through the working channel in the same location. The bronchioloalveolar lavage was performed in the segmental bronchi.  The aspirate was sent for cytology.  The robotic catheter was removed and an adult flexible bronchoscope was inserted.  There was no pooling of blood into the bronchial tree.  All tracheobronchial tree were cleared from secretions.    The flexible bronchoscope with the Olympus endobronchial ultrasound was inserted.  The level 4R lymph node was identified that measured 10 mm.  Using 21-gauge needle endobronchial Olympus needle, transbronchial FNA was performed.  Multiple passes with the needle were performed.  Level 11 R lymph node identified and measured 12 mm.  Final aspiration was performed.  The Olympus endobronchial ultrasound was removed and adult bronchoscope was inserted. There was no pooling of blood into the bronchial tree.  All tracheobronchial tree were cleared from secretions.    The patient was awakened from anesthesia, was extubated without incident, and was transported to the Post Anesthesia Care Unit in stable condition.    Findings:  Using 23-gauge needle, fine-needle aspiration of the right lower lobe nodule was performed.  Cryo biopsy of the right lower lobe nodule was performed using 1.1 mm ERBECRYO®  cryoprobe.  Bronchoalveolar lavage of the right lower lobe segment was performed.  The pathologist reported presence of non-small cell lung cancer.  10 mm level 4R lymph node.  12 mm level 11 R lymph node.  No evidence of significant active bleeding at the end of the procedure.    Estimated Blood Loss:  Minimal            Drains: None                 Specimens:   ID Type Source Tests Collected by Time   A (Not marked as sent) : right lower lobe FNA on slides Fine Needle Aspirate Lung, Right Lower Lobe FINE NEEDLE ASPIRATION Rene Smith MD 6/18/2024 1510   B (Not marked as sent) : right lower lobe FNA on slides in alcohol Fine Needle Aspirate Lung, Right Lower Lobe FINE NEEDLE ASPIRATION Rene Smith MD 6/18/2024 1510   C (Not marked as sent) : right lower lobe FNA in cell block Fine Needle Aspirate Lung, Right Lower Lobe FINE NEEDLE ASPIRATION Rene mSith MD 6/18/2024 1510   D (Not marked as sent) :  Tissue Lung, Right Lower Lobe TISSUE PATHOLOGY EXAM Rene Smith MD 6/18/2024 1516   E (Not marked as sent) : FNA of 11 R lymph node in cell block Fine Needle Aspirate Lymph Node FINE NEEDLE ASPIRATION Rene Smith MD 6/18/2024 1532   F (Not marked as sent) : right lower lobe cytology only Lavage Lung, Right Lower Lobe NON-GYNECOLOGIC CYTOLOGY Rene Smith MD 6/18/2024 1533   G (Not marked as sent) : FNA of 4 R lymph node Fine Needle Aspirate Lymph Node FINE NEEDLE ASPIRATION Rene Smith MD 6/18/2024 1536              Implants: None           Complications: None           Disposition: PACU - hemodynamically stable.           Condition: Stable     Rene Smith MD   Thoracic Surgeon  [facility]

## 2024-06-25 ENCOUNTER — HOSPITAL ENCOUNTER (OUTPATIENT)
Dept: RADIATION ONCOLOGY | Facility: HOSPITAL | Age: 64
Setting detail: RADIATION/ONCOLOGY SERIES
End: 2024-06-25
Payer: MEDICARE

## 2024-06-25 ENCOUNTER — HOSPITAL ENCOUNTER (OUTPATIENT)
Dept: RADIATION ONCOLOGY | Facility: HOSPITAL | Age: 64
Discharge: HOME | End: 2024-06-25

## 2024-06-25 PROCEDURE — 77334 RADIATION TREATMENT AID(S): CPT | Performed by: STUDENT IN AN ORGANIZED HEALTH CARE EDUCATION/TRAINING PROGRAM

## 2024-06-25 PROCEDURE — 77263 THER RADIOLOGY TX PLNG CPLX: CPT | Performed by: STUDENT IN AN ORGANIZED HEALTH CARE EDUCATION/TRAINING PROGRAM

## 2024-06-26 ENCOUNTER — TELEPHONE (OUTPATIENT)
Dept: SURGERY | Facility: CLINIC | Age: 64
End: 2024-06-26
Payer: MEDICARE

## 2024-06-26 NOTE — TELEPHONE ENCOUNTER
I left message regarding the cancellation of her 7/2/2024 appt per Dr Simth. Pt is now under Radiation Oncology care. Pt also has My Chart    DB 10:41  6/26/2024

## 2024-06-27 PROCEDURE — 77300 RADIATION THERAPY DOSE PLAN: CPT | Performed by: STUDENT IN AN ORGANIZED HEALTH CARE EDUCATION/TRAINING PROGRAM

## 2024-06-27 PROCEDURE — 77293 RESPIRATOR MOTION MGMT SIMUL: CPT | Performed by: STUDENT IN AN ORGANIZED HEALTH CARE EDUCATION/TRAINING PROGRAM

## 2024-06-27 PROCEDURE — 77338 DESIGN MLC DEVICE FOR IMRT: CPT | Performed by: STUDENT IN AN ORGANIZED HEALTH CARE EDUCATION/TRAINING PROGRAM

## 2024-06-27 PROCEDURE — 77301 RADIOTHERAPY DOSE PLAN IMRT: CPT | Performed by: STUDENT IN AN ORGANIZED HEALTH CARE EDUCATION/TRAINING PROGRAM

## 2024-07-08 ENCOUNTER — TELEPHONE (OUTPATIENT)
Dept: ONCOLOGY | Facility: CLINIC | Age: 64
End: 2024-07-08
Payer: MEDICARE

## 2024-07-08 ENCOUNTER — HOSPITAL ENCOUNTER (OUTPATIENT)
Dept: RADIATION ONCOLOGY | Facility: HOSPITAL | Age: 64
Setting detail: RADIATION/ONCOLOGY SERIES
End: 2024-07-08
Payer: MEDICARE

## 2024-07-08 ENCOUNTER — HOSPITAL ENCOUNTER (OUTPATIENT)
Dept: RADIATION ONCOLOGY | Facility: HOSPITAL | Age: 64
Discharge: HOME | End: 2024-07-08
Payer: MEDICARE

## 2024-07-08 LAB
RAD ONC ARIA COURSE ID: NORMAL
RAD ONC ARIA COURSE LAST TREATMENT DATE: NORMAL
RAD ONC ARIA COURSE START DATE: NORMAL
RAD ONC ARIA COURSE TREATMENT ELAPSED DAYS: 0
RAD ONC ARIA FIRST TREATMENT DATE: NORMAL
RAD ONC ARIA PLAN FRACTIONS TREATED TO DATE: 1
RAD ONC ARIA PLAN ID: NORMAL
RAD ONC ARIA PLAN PRESCRIBED DOSE PER FRACTION: 10 GY
RAD ONC ARIA PLAN PRIMARY REFERENCE POINT: NORMAL
RAD ONC ARIA PLAN TOTAL FRACTIONS PRESCRIBED: 5
RAD ONC ARIA PLAN TOTAL PRESCRIBED DOSE: 5000 CGY
RAD ONC ARIA REFERENCE POINT DOSAGE GIVEN TO DATE: 10 GY
RAD ONC ARIA REFERENCE POINT ID: NORMAL
RAD ONC ARIA REFERENCE POINT SESSION DOSAGE GIVEN: 10 GY

## 2024-07-08 PROCEDURE — 77373 STRTCTC BDY RAD THER TX DLVR: CPT | Performed by: STUDENT IN AN ORGANIZED HEALTH CARE EDUCATION/TRAINING PROGRAM

## 2024-07-08 PROCEDURE — 77435 SBRT MANAGEMENT: CPT | Performed by: STUDENT IN AN ORGANIZED HEALTH CARE EDUCATION/TRAINING PROGRAM

## 2024-07-08 NOTE — TELEPHONE ENCOUNTER
Returned Alexandra's call. I advised her that the pt should continue to follow-up with Dr. Castillo. She verbalized understanding. No further questions or needs at this time.

## 2024-07-08 NOTE — TELEPHONE ENCOUNTER
Caller: SUKH BOWDEN    Relationship: Emergency Contact    Best call back number: [phone]      What was the call regarding: DOES PATIENT STILL NEED HER FOLLOW UP  WITH DR THORPE SINCE SHE IS SEEING DR TRUJILLO

## 2024-07-10 ENCOUNTER — TELEPHONE (OUTPATIENT)
Dept: ONCOLOGY | Facility: CLINIC | Age: 64
End: 2024-07-10
Payer: MEDICARE

## 2024-07-10 ENCOUNTER — HOSPITAL ENCOUNTER (OUTPATIENT)
Dept: RADIATION ONCOLOGY | Facility: HOSPITAL | Age: 64
Discharge: HOME | End: 2024-07-10

## 2024-07-10 LAB
RAD ONC ARIA COURSE ID: NORMAL
RAD ONC ARIA COURSE LAST TREATMENT DATE: NORMAL
RAD ONC ARIA COURSE START DATE: NORMAL
RAD ONC ARIA COURSE TREATMENT ELAPSED DAYS: 2
RAD ONC ARIA FIRST TREATMENT DATE: NORMAL
RAD ONC ARIA PLAN FRACTIONS TREATED TO DATE: 2
RAD ONC ARIA PLAN ID: NORMAL
RAD ONC ARIA PLAN PRESCRIBED DOSE PER FRACTION: 10 GY
RAD ONC ARIA PLAN PRIMARY REFERENCE POINT: NORMAL
RAD ONC ARIA PLAN TOTAL FRACTIONS PRESCRIBED: 5
RAD ONC ARIA PLAN TOTAL PRESCRIBED DOSE: 5000 CGY
RAD ONC ARIA REFERENCE POINT DOSAGE GIVEN TO DATE: 20 GY
RAD ONC ARIA REFERENCE POINT ID: NORMAL
RAD ONC ARIA REFERENCE POINT SESSION DOSAGE GIVEN: 10 GY

## 2024-07-10 PROCEDURE — 77373 STRTCTC BDY RAD THER TX DLVR: CPT | Performed by: STUDENT IN AN ORGANIZED HEALTH CARE EDUCATION/TRAINING PROGRAM

## 2024-07-10 NOTE — TELEPHONE ENCOUNTER
Caller: SUKH BOWDEN    Relationship to patient: Emergency Contact    Best call back number: [phone]     Chief complaint: R/S    Type of visit: LAB & FOLLOW UP 1    Requested date: PLEASE CALL SUKH TO R/S THIS APPT,  NEEDS TO COME AT A DIFFERENT TIME DUE TO RADIATION APPT THAT DAY.  CALL DROPPED WHILE ON HOLD.  HUB ATTEMPTED TO CALL BACK BUT NO ANSWER.    If rescheduling, when is the original appointment: 7/15

## 2024-07-11 PROCEDURE — 77336 RADIATION PHYSICS CONSULT: CPT | Performed by: STUDENT IN AN ORGANIZED HEALTH CARE EDUCATION/TRAINING PROGRAM

## 2024-07-12 ENCOUNTER — HOSPITAL ENCOUNTER (OUTPATIENT)
Dept: RADIATION ONCOLOGY | Facility: HOSPITAL | Age: 64
Discharge: HOME | End: 2024-07-12

## 2024-07-12 LAB
RAD ONC ARIA COURSE ID: NORMAL
RAD ONC ARIA COURSE LAST TREATMENT DATE: NORMAL
RAD ONC ARIA COURSE START DATE: NORMAL
RAD ONC ARIA COURSE TREATMENT ELAPSED DAYS: 4
RAD ONC ARIA FIRST TREATMENT DATE: NORMAL
RAD ONC ARIA PLAN FRACTIONS TREATED TO DATE: 3
RAD ONC ARIA PLAN ID: NORMAL
RAD ONC ARIA PLAN PRESCRIBED DOSE PER FRACTION: 10 GY
RAD ONC ARIA PLAN PRIMARY REFERENCE POINT: NORMAL
RAD ONC ARIA PLAN TOTAL FRACTIONS PRESCRIBED: 5
RAD ONC ARIA PLAN TOTAL PRESCRIBED DOSE: 5000 CGY
RAD ONC ARIA REFERENCE POINT DOSAGE GIVEN TO DATE: 30 GY
RAD ONC ARIA REFERENCE POINT ID: NORMAL
RAD ONC ARIA REFERENCE POINT SESSION DOSAGE GIVEN: 10 GY

## 2024-07-12 PROCEDURE — 77373 STRTCTC BDY RAD THER TX DLVR: CPT | Performed by: STUDENT IN AN ORGANIZED HEALTH CARE EDUCATION/TRAINING PROGRAM

## 2024-07-15 ENCOUNTER — RADIATION ONCOLOGY WEEKLY ASSESSMENT (OUTPATIENT)
Dept: RADIATION ONCOLOGY | Facility: HOSPITAL | Age: 64
End: 2024-07-15
Payer: MEDICARE

## 2024-07-15 ENCOUNTER — LAB (OUTPATIENT)
Dept: LAB | Facility: HOSPITAL | Age: 64
End: 2024-07-15
Payer: MEDICARE

## 2024-07-15 ENCOUNTER — HOSPITAL ENCOUNTER (OUTPATIENT)
Dept: RADIATION ONCOLOGY | Facility: HOSPITAL | Age: 64
Discharge: HOME | End: 2024-07-15
Payer: MEDICARE

## 2024-07-15 ENCOUNTER — OFFICE VISIT (OUTPATIENT)
Dept: ONCOLOGY | Facility: CLINIC | Age: 64
End: 2024-07-15
Payer: MEDICARE

## 2024-07-15 VITALS
RESPIRATION RATE: 20 BRPM | HEART RATE: 73 BPM | WEIGHT: 221 LBS | OXYGEN SATURATION: 94 % | SYSTOLIC BLOOD PRESSURE: 124 MMHG | TEMPERATURE: 98 F | BODY MASS INDEX: 33.49 KG/M2 | DIASTOLIC BLOOD PRESSURE: 67 MMHG | HEIGHT: 68 IN

## 2024-07-15 VITALS
HEART RATE: 73 BPM | OXYGEN SATURATION: 94 % | BODY MASS INDEX: 33.49 KG/M2 | WEIGHT: 221 LBS | DIASTOLIC BLOOD PRESSURE: 67 MMHG | RESPIRATION RATE: 20 BRPM | HEIGHT: 68 IN | TEMPERATURE: 98 F | SYSTOLIC BLOOD PRESSURE: 124 MMHG

## 2024-07-15 DIAGNOSIS — R91.8 LUNG MASS: Primary | ICD-10-CM

## 2024-07-15 DIAGNOSIS — C34.11 MALIGNANT NEOPLASM OF UPPER LOBE OF RIGHT LUNG: Primary | ICD-10-CM

## 2024-07-15 LAB
BASOPHILS # BLD AUTO: 0.06 10*3/MM3 (ref 0–0.2)
BASOPHILS NFR BLD AUTO: 0.7 % (ref 0–1.5)
DEPRECATED RDW RBC AUTO: 51.8 FL (ref 37–54)
EOSINOPHIL # BLD AUTO: 0.31 10*3/MM3 (ref 0–0.4)
EOSINOPHIL NFR BLD AUTO: 3.5 % (ref 0.3–6.2)
ERYTHROCYTE [DISTWIDTH] IN BLOOD BY AUTOMATED COUNT: 16.3 % (ref 12.3–15.4)
HCT VFR BLD AUTO: 39.9 % (ref 34–46.6)
HGB BLD-MCNC: 12.7 G/DL (ref 12–15.9)
HOLD SPECIMEN: NORMAL
HOLD SPECIMEN: NORMAL
LYMPHOCYTES # BLD AUTO: 2.47 10*3/MM3 (ref 0.7–3.1)
LYMPHOCYTES NFR BLD AUTO: 28.2 % (ref 19.6–45.3)
MCH RBC QN AUTO: 28.3 PG (ref 26.6–33)
MCHC RBC AUTO-ENTMCNC: 31.8 G/DL (ref 31.5–35.7)
MCV RBC AUTO: 89.1 FL (ref 79–97)
MONOCYTES # BLD AUTO: 0.68 10*3/MM3 (ref 0.1–0.9)
MONOCYTES NFR BLD AUTO: 7.8 % (ref 5–12)
NEUTROPHILS NFR BLD AUTO: 5.23 10*3/MM3 (ref 1.7–7)
NEUTROPHILS NFR BLD AUTO: 59.8 % (ref 42.7–76)
PLATELET # BLD AUTO: 144 10*3/MM3 (ref 140–450)
PMV BLD AUTO: 9.9 FL (ref 6–12)
RAD ONC ARIA COURSE ID: NORMAL
RAD ONC ARIA COURSE LAST TREATMENT DATE: NORMAL
RAD ONC ARIA COURSE START DATE: NORMAL
RAD ONC ARIA COURSE TREATMENT ELAPSED DAYS: 7
RAD ONC ARIA FIRST TREATMENT DATE: NORMAL
RAD ONC ARIA PLAN FRACTIONS TREATED TO DATE: 4
RAD ONC ARIA PLAN ID: NORMAL
RAD ONC ARIA PLAN PRESCRIBED DOSE PER FRACTION: 10 GY
RAD ONC ARIA PLAN PRIMARY REFERENCE POINT: NORMAL
RAD ONC ARIA PLAN TOTAL FRACTIONS PRESCRIBED: 5
RAD ONC ARIA PLAN TOTAL PRESCRIBED DOSE: 5000 CGY
RAD ONC ARIA REFERENCE POINT DOSAGE GIVEN TO DATE: 40 GY
RAD ONC ARIA REFERENCE POINT ID: NORMAL
RAD ONC ARIA REFERENCE POINT SESSION DOSAGE GIVEN: 10 GY
RBC # BLD AUTO: 4.48 10*6/MM3 (ref 3.77–5.28)
WBC NRBC COR # BLD AUTO: 8.75 10*3/MM3 (ref 3.4–10.8)

## 2024-07-15 PROCEDURE — 36415 COLL VENOUS BLD VENIPUNCTURE: CPT

## 2024-07-15 PROCEDURE — 99215 OFFICE O/P EST HI 40 MIN: CPT | Performed by: INTERNAL MEDICINE

## 2024-07-15 PROCEDURE — 85025 COMPLETE CBC W/AUTO DIFF WBC: CPT

## 2024-07-15 PROCEDURE — 1126F AMNT PAIN NOTED NONE PRSNT: CPT | Performed by: INTERNAL MEDICINE

## 2024-07-15 PROCEDURE — 77373 STRTCTC BDY RAD THER TX DLVR: CPT | Performed by: STUDENT IN AN ORGANIZED HEALTH CARE EDUCATION/TRAINING PROGRAM

## 2024-07-15 RX ORDER — DIPHENHYDRAMINE HCL 25 MG
TABLET ORAL
COMMUNITY
Start: 2024-05-15

## 2024-07-15 NOTE — PROGRESS NOTES
"Radiation Oncology  On-Treatment Note      Patient: Jeanne King    MRN: [id number]    Attending Physician: Stevie Soto MD     Diagnosis:     ICD-10-CM ICD-9-CM   1. Malignant neoplasm of upper lobe of right lung  C34.11 162.3       Radiation Therapy Visit:  Continue radiation therapy, Dosimetry plan remains acceptable, Films reviewed and remains acceptable, Pain assessed, Pain management planned, Radiation dose schedule reviewed and remains acceptable, Radiation technique remains acceptable, and Symptoms within expected range    Radiation Treatments       Active   Plans   RtLL SBRT FB   Most recent treatment: Dose planned: 1,000 cGy (fraction 3 on 7/12/2024)   Total: Dose planned: 5,000 cGy (5 fractions)   Elapsed Days: 4      Reference Points   RLL SBRT   Most recent treatment: Dose given: 1,000 cGy (on 7/12/2024)   Total: Dose given: 3,000 cGy   Elapsed Days: 4                      Physical Examination:  Vitals: Blood pressure 124/67, pulse 73, temperature 98 °F (36.7 °C), temperature source Infrared, resp. rate 20, height 172.7 cm (68\"), weight 100 kg (221 lb), SpO2 94%.  Pain Score    07/15/24 1650   PainSc: 0-No pain       Ambulatory and capable of all selfcare but unable to carry out any work activities; up and about more than 50% of waking hours = 2    We examined the relevant areas: yes  Findings are within the expected range for this stage of treatment: yes  -------------------------------------------------------------------------------------------------------------------    ACTION ITEMS:  Patient tolerating treatment well and as expected for this stage in their treatment and Continue radiation therapy as planned    Estimated Completion Date: 7/17/2024    Follow up in 3 months with CT Chest      Stevie Soto MD  Radiation Oncology  "

## 2024-07-15 NOTE — PROGRESS NOTES
Hematology/Oncology Outpatient Follow Up    PATIENT NAME:Jeanne King  :1960  MRN: [id number]  PRIMARY CARE PHYSICIAN: Provider, No Known  REFERRING PHYSICIAN: No ref. provider found    Chief Complaint   Patient presents with    Follow-up     Lung mass          HISTORY OF PRESENT ILLNESS:     Jeanne King is a 64 y.o. female who [facility].  Past medical history of hypertension, COPD.  She reported that she has not felt well for some time.  She does not like going to the doctor and is anxious about seeking medical treatment.  She reported having shortness of breath and cough that she attributed to lingering effects of COVID infection and did not seek medical treatment.  [facility] and was negative for pulmonary embolism, did show a right lower lobe spiculated mass lesion measuring 2.3 cm and a more distal lesion measuring 1.7 cm along with mediastinal lymphadenopathy with a right hilar node measuring 2.1 cm.  Upper abdomen showed a cirrhotic enlarged liver hence splenomegaly along with abdominal ascites.     24  Hematology/Oncology was consulted for lung lesions suspicious for primary lung malignancy.  The patient reports that she is a current smoker with a 50-year history of smoking on average 1 and half packs per day.  Her family history is positive for lung cancer with her brother and liver cancer and another brother.  She reports that she is not up-to-date with any routine screenings for malignancy.     She has been found to have right lower lobe spiculated mass measuring 2.3 cm, another mass that measures 1.7 cm with mediastinal lymphadenopathy.  There is also evidence of cirrhosis of the liver with portal hypertension.  We have been asked to see patient due to lung lesions.      5/10/2024 patient had right hilar node and subcarinal lymph node with  no evidence of malignancy.  The right upper lobe bile was negative for malignancy.  6/8/2024: Patient had a PET CT scan which basically revealed no hypermetabolic adenopathy in the neck.  Within the superior segment of the right lower lobe extending to involve the right pulmonary fissure there is a 2.5 cm x 2.8 cm hypermetabolic with SUV of 8.6 consistent with malignancy.  There is no hypermetabolic mediastinal or hilar adenopathy.  No additional hypermetabolic pulmonary nodule was seen.  Dilated main pulmonary artery measuring up to 4.2 cm seen in pulmonary hypertension severely mildly enlarged mediastinal lymph nodes again noted but not hypermetabolic.  There is a right paratracheal node that measures 1.4 cm.  She has bilateral adrenal adenomas.  There is no hypermetabolic bone lesions to indicate metastatic disease.  6/18/2024 patient underwent navigational bronchoscopy with endobronchial ultrasound and fine-needle aspiration of lymph node x 2 and iron robot with fine-needle aspiration of the right lower lobe biopsy, final pathology revealed positive for malignancy non-small cell favoring invasive moderately differentiated pulmonary adenocarcinoma positive for TTF-1, CK7 lymph node biopsy was negative for malignancy  Patient was seen by Dr. Paris and determined not to be a surgical candidate  6/24/2024 patient was seen by Dr. Soto and determined to be a candidate for SRS  Past Medical History:   Diagnosis Date    COPD (chronic obstructive pulmonary disease)     Hypertension        Past Surgical History:   Procedure Laterality Date    BRONCHOSCOPY N/A 5/10/2024    Procedure: BRONCHOSCOPY WITH BRONCHOALVEOLAR LAVAGE AND ENDOBRONCHIAL ULTRASOUND WITH FINE NEEDLE ASPIRATION x2;  Surgeon: Bessy Mcguire MD;  Location: [facility];  Service: Pulmonary;  Laterality: N/A;  lung mass    BRONCHOSCOPY WITH ION ROBOTIC ASSIST N/A 6/18/2024    Procedure: BRONCHOSCOPY NAVIGATION WITH ENDOBRONCHIAL ULTRASOUND with fine  needle aspiration of lymph node x2  AND ION ROBOT with fine needle aspiration of right lower lobe lung mass;cryo tissue biopsy of right lower lobe lung mass;  Surgeon: Rene Smith MD;  Location: [facility];  Service: Robotics - Pulmonary;  Laterality: N/A;  right lower lobe lung mass    HYSTERECTOMY           Current Outpatient Medications:     diphenhydrAMINE (Benadryl Allergy) 25 MG tablet, , Disp: , Rfl:     acetaZOLAMIDE (DIAMOX) 250 MG tablet, Take 1 tablet by mouth Daily., Disp: 30 tablet, Rfl: 0    albuterol sulfate  (90 Base) MCG/ACT inhaler, Inhale 2 puffs Every 4 (Four) Hours As Needed for Wheezing., Disp: , Rfl:     ALPRAZolam (XANAX) 1 MG tablet, Take 1 tablet by mouth 3 (Three) Times a Day As Needed for Anxiety., Disp: , Rfl:     budesonide (PULMICORT) 0.5 MG/2ML nebulizer solution, Take 2 mL by nebulization 2 (Two) Times a Day., Disp: 120 mL, Rfl: 0    FLUoxetine (PROzac) 10 MG capsule, Take 1 capsule by mouth Daily., Disp: , Rfl:     FLUoxetine (PROzac) 20 MG capsule, Take 1 capsule by mouth Daily., Disp: , Rfl:     folic acid (FOLVITE) 1 MG tablet, Take 1 tablet by mouth Daily., Disp: 30 tablet, Rfl: 0    furosemide (LASIX) 40 MG tablet, Take 1 tablet by mouth Daily., Disp: 30 tablet, Rfl: 0    ipratropium-albuterol (DUO-NEB) 0.5-2.5 mg/3 ml nebulizer, Take 3 mL by nebulization Every 4 (Four) Hours As Needed for Shortness of Air or Wheezing. Copd j44.9, Disp: 360 mL, Rfl: 3    Magnesium Oxide -Mg Supplement 500 MG tablet, Take 1 tablet by mouth Daily., Disp: , Rfl:     metoprolol succinate XL (TOPROL-XL) 25 MG 24 hr tablet, Take 1 tablet by mouth Every 12 (Twelve) Hours., Disp: 60 tablet, Rfl: 0    Potassium Gluconate 550 (90 K) MG tablet, 595 mg., Disp: , Rfl:     sennosides-docusate (PERICOLACE) 8.6-50 MG per tablet, Take 2 tablets by mouth 2 (Two) Times a Day As Needed for Constipation., Disp: 30 tablet, Rfl: 0    Allergies   Allergen Reactions    Hydrocodone Unknown - Low Severity     Keflex [Cephalexin] Mental Status Change and Provider Review Needed    Morphine Mental Status Change    Premarin [Conjugated Estrogens] Mental Status Change       Family History   Problem Relation Age of Onset    No Known Problems Mother     No Known Problems Father     No Known Problems Sister     No Known Problems Brother        Cancer-related family history is not on file.    Social History     Tobacco Use    Smoking status: Former     Current packs/day: 1.50     Average packs/day: 1.5 packs/day for 53.5 years (80.3 ttl pk-yrs)     Types: Cigarettes     Start date: 1971    Smokeless tobacco: Never   Vaping Use    Vaping status: Never Used   Substance Use Topics    Alcohol use: Never    Drug use: Never       I have reviewed and confirmed the accuracy of the patient's history: Chief complaint, HPI, ROS, and Subjective as entered by the MA/LPN/RN. Laury Castillo MD 07/15/24  7/15/24    SUBJECTIVE:     Patient is here today for routine follow-up and does not have any new issues.  She remains on 3 L of oxygen continuous    Patient denies any new issues    So far she is tolerating radiation well she has 2 more doses to finish    REVIEW OF SYSTEMS:  Review of Systems   Constitutional:  Positive for fatigue. Negative for chills and fever.   HENT:  Negative for congestion, drooling, ear discharge, rhinorrhea, sinus pressure and tinnitus.    Eyes:  Negative for photophobia, pain and discharge.   Respiratory:  Negative for apnea, choking and stridor.    Cardiovascular:  Negative for palpitations.   Gastrointestinal:  Negative for abdominal distention, abdominal pain and anal bleeding.   Endocrine: Negative for polydipsia and polyphagia.   Genitourinary:  Negative for decreased urine volume, flank pain and genital sores.   Musculoskeletal:  Negative for gait problem, neck pain and neck stiffness.   Skin:  Negative for color change, rash and wound.   Neurological:  Positive for weakness. Negative for tremors,  "seizures, syncope, facial asymmetry and speech difficulty.   Hematological:  Negative for adenopathy.   Psychiatric/Behavioral:  Negative for agitation, confusion, hallucinations and self-injury. The patient is not hyperactive.        OBJECTIVE:    Vitals:    07/15/24 1326   BP: 124/67   Pulse: 73   Resp: 20   Temp: 98 °F (36.7 °C)   TempSrc: Infrared   SpO2: 94%  Comment: 3 L oxygen   Weight: 100 kg (221 lb)   Height: 172.7 cm (68\")   PainSc: 0-No pain       Body mass index is 33.6 kg/m².    ECOG  (1) Restricted in physically strenuous activity, ambulatory and able to do work of light nature    Physical Exam  Vitals and nursing note reviewed.   Constitutional:       General: She is not in acute distress.     Appearance: She is not diaphoretic.   HENT:      Head: Normocephalic and atraumatic.   Eyes:      General: No scleral icterus.        Right eye: No discharge.         Left eye: No discharge.      Conjunctiva/sclera: Conjunctivae normal.   Neck:      Thyroid: No thyromegaly.   Cardiovascular:      Rate and Rhythm: Normal rate and regular rhythm.      Heart sounds: Normal heart sounds.      No friction rub. No gallop.   Pulmonary:      Effort: Pulmonary effort is normal. No respiratory distress.      Breath sounds: No stridor. Rhonchi present. No wheezing.   Abdominal:      General: Bowel sounds are normal.      Palpations: Abdomen is soft. There is no mass.      Tenderness: There is no abdominal tenderness. There is no guarding or rebound.   Musculoskeletal:         General: No tenderness. Normal range of motion.      Cervical back: Normal range of motion and neck supple.   Lymphadenopathy:      Cervical: No cervical adenopathy.   Skin:     General: Skin is warm.      Findings: No erythema or rash.   Neurological:      Mental Status: She is alert and oriented to person, place, and time.      Motor: No abnormal muscle tone.   Psychiatric:         Behavior: Behavior normal.       I have reexamined the patient and " the results are consistent with the previously documented exam. Laury Ave Castillo MD    RECENT LABS  WBC   Date Value Ref Range Status   07/15/2024 8.75 3.40 - 10.80 10*3/mm3 Final     RBC   Date Value Ref Range Status   07/15/2024 4.48 3.77 - 5.28 10*6/mm3 Final     Hemoglobin   Date Value Ref Range Status   07/15/2024 12.7 12.0 - 15.9 g/dL Final     Hematocrit   Date Value Ref Range Status   07/15/2024 39.9 34.0 - 46.6 % Final     MCV   Date Value Ref Range Status   07/15/2024 89.1 79.0 - 97.0 fL Final     MCH   Date Value Ref Range Status   07/15/2024 28.3 26.6 - 33.0 pg Final     MCHC   Date Value Ref Range Status   07/15/2024 31.8 31.5 - 35.7 g/dL Final     RDW   Date Value Ref Range Status   07/15/2024 16.3 (H) 12.3 - 15.4 % Final     RDW-SD   Date Value Ref Range Status   07/15/2024 51.8 37.0 - 54.0 fl Final     MPV   Date Value Ref Range Status   07/15/2024 9.9 6.0 - 12.0 fL Final     Platelets   Date Value Ref Range Status   07/15/2024 144 140 - 450 10*3/mm3 Final     Neutrophil %   Date Value Ref Range Status   07/15/2024 59.8 42.7 - 76.0 % Final     Lymphocyte %   Date Value Ref Range Status   07/15/2024 28.2 19.6 - 45.3 % Final     Monocyte %   Date Value Ref Range Status   07/15/2024 7.8 5.0 - 12.0 % Final     Eosinophil %   Date Value Ref Range Status   07/15/2024 3.5 0.3 - 6.2 % Final     Basophil %   Date Value Ref Range Status   07/15/2024 0.7 0.0 - 1.5 % Final     Immature Grans %   Date Value Ref Range Status   05/12/2024 0.8 (H) 0.0 - 0.5 % Final     Neutrophils, Absolute   Date Value Ref Range Status   07/15/2024 5.23 1.70 - 7.00 10*3/mm3 Final     Lymphocytes, Absolute   Date Value Ref Range Status   07/15/2024 2.47 0.70 - 3.10 10*3/mm3 Final     Monocytes, Absolute   Date Value Ref Range Status   07/15/2024 0.68 0.10 - 0.90 10*3/mm3 Final     Eosinophils, Absolute   Date Value Ref Range Status   07/15/2024 0.31 0.00 - 0.40 10*3/mm3 Final     Basophils, Absolute   Date Value Ref Range  Status   07/15/2024 0.06 0.00 - 0.20 10*3/mm3 Final     Immature Grans, Absolute   Date Value Ref Range Status   05/12/2024 0.08 (H) 0.00 - 0.05 10*3/mm3 Final     nRBC   Date Value Ref Range Status   05/12/2024 0.0 0.0 - 0.2 /100 WBC Final       Lab Results   Component Value Date    GLUCOSE 109 (H) 06/18/2024    BUN 12 06/18/2024    CREATININE 0.63 06/18/2024    BCR 19.0 06/18/2024    K 3.8 06/18/2024    CO2 34.9 (H) 06/18/2024    CALCIUM 9.4 06/18/2024    ALBUMIN 3.7 05/13/2024    AST 15 05/13/2024    ALT 51 (H) 05/13/2024         Assessment & Plan     Lung mass  - CBC & Differential        ASSESSMENT:    Adenocarcinoma of the lung Presenting as a stage IA3.  Status post ION bronchoscopy/EBUS.  Undergoing SBRT to lung cancer  Polycythemia: Hemoglobin 18.2 g/dL/hematocrit 61.9, normal WBC.  EPO is low at 2.5 range is 2.6-18.5 worrisome for myeloproliferative disease..  JAK2 negative, MPL panel was negative hemoglobin is down to 12 g per DL  Chronic hypoxemia: Now on oxygen which may have helped the polycythemia  Thrombocytopenia: No baseline.  In the setting of cirrhosis and splenomegaly.  Will assess for nutritional deficiencies.  Platelets have normalized 242,000.  Normal B12.  Folate low at 4.27.  Continue folic acid 1 mg p.o. daily x 3 months.  Iatrogenic elevated iron levels.  HFE analysis did not show any mutation.  Patient has been asked to discontinue oral iron supplementation.  Cirrhosis of the liver: New diagnosis, seen on Ultrasound of the abdomen.  Referred to GI.  Appointment is pending  Folate deficiency patient is on folic acid replacement therapy.  1 mg p.o. daily  History of CHF: Patient encouraged to follow-up with cardiologist  Posthospital visit     PLANS      Complete SBRT to lung malignancy  Comprehensive MPL panel was negative  Referred to GI for possible cirrhosis.  Patient encouraged to  Repeat iron studies in approximately 6 months.    Follow up in 2 months  All questions answered  All  questions answered            I spent 40 total minutes, face-to-face, caring for Jeanne today. 90% of this time involved counseling and/or coordination of care as documented within this note.

## 2024-07-16 DIAGNOSIS — C34.11 MALIGNANT NEOPLASM OF UPPER LOBE OF RIGHT LUNG: Primary | ICD-10-CM

## 2024-07-17 ENCOUNTER — HOSPITAL ENCOUNTER (OUTPATIENT)
Dept: RADIATION ONCOLOGY | Facility: HOSPITAL | Age: 64
Discharge: HOME | End: 2024-07-17

## 2024-07-17 ENCOUNTER — TREATMENT (OUTPATIENT)
Dept: RADIATION ONCOLOGY | Facility: HOSPITAL | Age: 64
End: 2024-07-17
Payer: MEDICARE

## 2024-07-17 DIAGNOSIS — C34.31 ADENOCARCINOMA OF LOWER LOBE OF RIGHT LUNG: Primary | ICD-10-CM

## 2024-07-17 LAB
RAD ONC ARIA COURSE ID: NORMAL
RAD ONC ARIA COURSE LAST TREATMENT DATE: NORMAL
RAD ONC ARIA COURSE START DATE: NORMAL
RAD ONC ARIA COURSE TREATMENT ELAPSED DAYS: 9
RAD ONC ARIA FIRST TREATMENT DATE: NORMAL
RAD ONC ARIA PLAN FRACTIONS TREATED TO DATE: 5
RAD ONC ARIA PLAN ID: NORMAL
RAD ONC ARIA PLAN PRESCRIBED DOSE PER FRACTION: 10 GY
RAD ONC ARIA PLAN PRIMARY REFERENCE POINT: NORMAL
RAD ONC ARIA PLAN TOTAL FRACTIONS PRESCRIBED: 5
RAD ONC ARIA PLAN TOTAL PRESCRIBED DOSE: 5000 CGY
RAD ONC ARIA REFERENCE POINT DOSAGE GIVEN TO DATE: 50 GY
RAD ONC ARIA REFERENCE POINT ID: NORMAL
RAD ONC ARIA REFERENCE POINT SESSION DOSAGE GIVEN: 10 GY

## 2024-07-17 PROCEDURE — 77373 STRTCTC BDY RAD THER TX DLVR: CPT | Performed by: INTERNAL MEDICINE

## 2024-07-17 NOTE — SIGNIFICANT NOTE
Called and left message to check on patient and her missed scan and MD appts. Left my direct number [phone]   Quality 226: Preventive Care And Screening: Tobacco Use: Screening And Cessation Intervention: Patient screened for tobacco use and is an ex/non-smoker Detail Level: Detailed Quality 431: Preventive Care And Screening: Unhealthy Alcohol Use - Screening: Patient not identified as an unhealthy alcohol user when screened for unhealthy alcohol use using a systematic screening method

## 2024-07-18 LAB
RAD ONC ARIA COURSE END DATE: NORMAL
RAD ONC ARIA COURSE ID: NORMAL
RAD ONC ARIA COURSE LAST TREATMENT DATE: NORMAL
RAD ONC ARIA COURSE START DATE: NORMAL
RAD ONC ARIA COURSE TREATMENT ELAPSED DAYS: 9
RAD ONC ARIA FIRST TREATMENT DATE: NORMAL
RAD ONC ARIA PLAN FRACTIONS TREATED TO DATE: 5
RAD ONC ARIA PLAN ID: NORMAL
RAD ONC ARIA PLAN NAME: NORMAL
RAD ONC ARIA PLAN PRESCRIBED DOSE PER FRACTION: 10 GY
RAD ONC ARIA PLAN PRIMARY REFERENCE POINT: NORMAL
RAD ONC ARIA PLAN TOTAL FRACTIONS PRESCRIBED: 5
RAD ONC ARIA PLAN TOTAL PRESCRIBED DOSE: 5000 CGY
RAD ONC ARIA REFERENCE POINT DOSAGE GIVEN TO DATE: 50 GY
RAD ONC ARIA REFERENCE POINT ID: NORMAL

## 2024-07-22 NOTE — PROGRESS NOTES
Radiation Treatment Summary Note      Patient Name: Jeanne King  : 1960    Attending Provider: Stevie Soto MD      Diagnosis:     ICD-10-CM ICD-9-CM   1. Adenocarcinoma of lower lobe of right lung  C34.31 162.5       Radiation Start Date: 2024    Radiation Completion Date: 2024      Prescription:     Site: RLL  Laterality: Right  Total Dose: 5000cGy  Dose per Fraction: 1000cGy  Total Fractions: 5  Daily or BID:  QOD  Modality: Photon  Technique: SBRT (2-5fx)  Bolus: No    Final Delivered Dose Deviated From Initially Prescribed Dose: No    Concurrent Chemotherapy: No    Patient Tolerated Treatment Without Unexpected Side Effects/Complications: Yes    ECOG: Ambulatory and capable of all selfcare but unable to carry out any work activities; up and about more than 50% of waking hours = 2    Pain Management Plan: None Indicated/PRN OTC    Follow-Up Plan: 3 months    Imaging Ordered for Follow-Up: Yes, describe: CT Chest        Stevie Soto MD

## 2024-08-02 ENCOUNTER — TELEPHONE (OUTPATIENT)
Dept: CARDIOLOGY | Facility: CLINIC | Age: 64
End: 2024-08-02
Payer: MEDICARE

## 2024-08-02 NOTE — TELEPHONE ENCOUNTER
Caller: Jeanne King    Relationship to patient: Self    Best call back number:[phone]    Chief complaint:     Type of visit: HOSPITAL F/U    Requested date: AS SOON AS POSSIBLE     If rescheduling, when is the original appointment: 06.25.24     Additional notes: PATIENT WOULD LIKE TO GET HOSPITAL FOLLOW UP RESCHEDULED. PLEASE CONTACT PATIENT TO SCHEDULE. THANK YOU.

## 2024-10-09 ENCOUNTER — TELEPHONE (OUTPATIENT)
Dept: ONCOLOGY | Facility: CLINIC | Age: 64
End: 2024-10-09

## 2024-10-09 NOTE — TELEPHONE ENCOUNTER
Caller:SUKH - DAUGHTER     Relationship:  CHILD    Best call back number:[phone]    PATIENT CALLED REQUESTING TO CANCEL SAME DAY APPT.    Did the patient call AFTER the start time of their scheduled appointment?  NO    Was the patient's appointment rescheduled?   NO    Any additional information:PT HAS NO POWER & HOPES TO R/S FOR 10/11/24 BEFORE OR AFTER HER CT SCAN AT [place]

## 2024-10-10 NOTE — PROGRESS NOTES
Hematology/Oncology Outpatient Follow Up    PATIENT NAME:Jeanne King  :1960  MRN: [id number]  PRIMARY CARE PHYSICIAN: Alea Quevedo MD  REFERRING PHYSICIAN: No ref. provider found    Chief Complaint   Patient presents with    Follow-up     Lung mass        HISTORY OF PRESENT ILLNESS:     Jeanne King is a 64 y.o. female who [facility] of [facility] swelling.  Past medical history of hypertension, COPD.  She reported that she has not felt well for some time.  She does not like going to the doctor and is anxious about seeking medical treatment.  She reported having shortness of breath and cough that she attributed to lingering effects of COVID infection and did not seek medical treatment.  [facility] and was negative for pulmonary embolism, did show a right lower lobe spiculated mass lesion measuring 2.3 cm and a more distal lesion measuring 1.7 cm along with mediastinal lymphadenopathy with a right hilar node measuring 2.1 cm.  Upper abdomen showed a cirrhotic enlarged liver hence splenomegaly along with abdominal ascites.     24  Hematology/Oncology was consulted for lung lesions suspicious for primary lung malignancy.  The patient reports that she is a current smoker with a 50-year history of smoking on average 1 and half packs per day.  Her family history is positive for lung cancer with her brother and liver cancer and another brother.  She reports that she is not up-to-date with any routine screenings for malignancy.     She has been found to have right lower lobe spiculated mass measuring 2.3 cm, another mass that measures 1.7 cm with mediastinal lymphadenopathy.  There is also evidence of cirrhosis of the liver with portal hypertension.  We have been asked to see patient due to lung lesions.      5/10/2024 patient had right hilar node and subcarinal lymph node  with no evidence of malignancy.  The right upper lobe bile was negative for malignancy.  6/8/2024: Patient had a PET CT scan which basically revealed no hypermetabolic adenopathy in the neck.  Within the superior segment of the right lower lobe extending to involve the right pulmonary fissure there is a 2.5 cm x 2.8 cm hypermetabolic with SUV of 8.6 consistent with malignancy.  There is no hypermetabolic mediastinal or hilar adenopathy.  No additional hypermetabolic pulmonary nodule was seen.  Dilated main pulmonary artery measuring up to 4.2 cm seen in pulmonary hypertension severely mildly enlarged mediastinal lymph nodes again noted but not hypermetabolic.  There is a right paratracheal node that measures 1.4 cm.  She has bilateral adrenal adenomas.  There is no hypermetabolic bone lesions to indicate metastatic disease.  6/18/2024 patient underwent navigational bronchoscopy with endobronchial ultrasound and fine-needle aspiration of lymph node x 2 and iron robot with fine-needle aspiration of the right lower lobe biopsy, final pathology revealed positive for malignancy non-small cell favoring invasive moderately differentiated pulmonary adenocarcinoma positive for TTF-1, CK7 lymph node biopsy was negative for malignancy  Patient was seen by Dr. Paris and determined not to be a surgical candidate  6/24/2024 patient was seen by Dr. Soto and determined to be a candidate for SRS  Past Medical History:   Diagnosis Date    COPD (chronic obstructive pulmonary disease)     Hypertension        Past Surgical History:   Procedure Laterality Date    BRONCHOSCOPY N/A 5/10/2024    Procedure: BRONCHOSCOPY WITH BRONCHOALVEOLAR LAVAGE AND ENDOBRONCHIAL ULTRASOUND WITH FINE NEEDLE ASPIRATION x2;  Surgeon: Bessy Mcguire MD;  Location: [facility];  Service: Pulmonary;  Laterality: N/A;  lung mass    BRONCHOSCOPY WITH ION ROBOTIC ASSIST N/A 6/18/2024    Procedure: BRONCHOSCOPY NAVIGATION WITH ENDOBRONCHIAL ULTRASOUND with  fine needle aspiration of lymph node x2  AND ION ROBOT with fine needle aspiration of right lower lobe lung mass;cryo tissue biopsy of right lower lobe lung mass;  Surgeon: Rene Smith MD;  Location: [facility] ENDOSCOPY;  Service: Robotics - Pulmonary;  Laterality: N/A;  right lower lobe lung mass    HYSTERECTOMY           Current Outpatient Medications:     acetaZOLAMIDE (DIAMOX) 250 MG tablet, Take 1 tablet by mouth Daily., Disp: 30 tablet, Rfl: 0    albuterol sulfate  (90 Base) MCG/ACT inhaler, Inhale 2 puffs Every 4 (Four) Hours As Needed for Wheezing., Disp: , Rfl:     ALPRAZolam (XANAX) 1 MG tablet, Take 1 tablet by mouth 3 (Three) Times a Day As Needed for Anxiety., Disp: , Rfl:     diphenhydrAMINE (Benadryl Allergy) 25 MG tablet, , Disp: , Rfl:     FLUoxetine (PROzac) 10 MG capsule, Take 1 capsule by mouth Daily., Disp: , Rfl:     FLUoxetine (PROzac) 20 MG capsule, Take 1 capsule by mouth Daily., Disp: , Rfl:     folic acid (FOLVITE) 1 MG tablet, Take 1 tablet by mouth Daily., Disp: 30 tablet, Rfl: 0    furosemide (LASIX) 40 MG tablet, Take 1 tablet by mouth Daily., Disp: 30 tablet, Rfl: 0    ipratropium-albuterol (DUO-NEB) 0.5-2.5 mg/3 ml nebulizer, Take 3 mL by nebulization Every 4 (Four) Hours As Needed for Shortness of Air or Wheezing. Copd j44.9, Disp: 360 mL, Rfl: 3    Magnesium Oxide -Mg Supplement 500 MG tablet, Take 1 tablet by mouth Daily., Disp: , Rfl:     metoprolol succinate XL (TOPROL-XL) 25 MG 24 hr tablet, Take 1 tablet by mouth Every 12 (Twelve) Hours., Disp: 60 tablet, Rfl: 0    Potassium Gluconate 550 (90 K) MG tablet, 595 mg., Disp: , Rfl:     sennosides-docusate (PERICOLACE) 8.6-50 MG per tablet, Take 2 tablets by mouth 2 (Two) Times a Day As Needed for Constipation., Disp: 30 tablet, Rfl: 0    budesonide (PULMICORT) 0.5 MG/2ML nebulizer solution, Take 2 mL by nebulization 2 (Two) Times a Day. (Patient not taking: Reported on 10/11/2024), Disp: 120 mL, Rfl: 0    Cholecalciferol 25  MCG (1000 UT) tablet, Take 1 tablet by mouth Daily., Disp: , Rfl:     nystatin 753962 UNIT/GM powder, APPLY 1 APPLICATION TO AFFECTED SKIN TWO TIMES PER DAY (Patient not taking: Reported on 10/11/2024), Disp: , Rfl:     Allergies   Allergen Reactions    Hydrocodone Unknown - Low Severity    Keflex [Cephalexin] Mental Status Change and Provider Review Needed    Morphine Mental Status Change    Premarin [Conjugated Estrogens] Mental Status Change       Family History   Problem Relation Age of Onset    No Known Problems Mother     No Known Problems Father     No Known Problems Sister     No Known Problems Brother        Cancer-related family history is not on file.    Social History     Tobacco Use    Smoking status: Former     Current packs/day: 1.50     Average packs/day: 1.5 packs/day for 53.8 years (80.7 ttl pk-yrs)     Types: Cigarettes     Start date: 1971    Smokeless tobacco: Never   Vaping Use    Vaping status: Never Used   Substance Use Topics    Alcohol use: Never    Drug use: Never         I have reviewed and confirmed the accuracy of the patient's history: Chief complaint, HPI, ROS, and Subjective as entered by the MA/LPN/RN. Laury Castillo MD 10/11/24        SUBJECTIVE:     Patient is here today for routine follow-up and does not have any new issues.  She remains on 3 L of oxygen continuous    Patient denies any new issues    So far she is tolerating radiation well she has 2 more doses to finish      Patient is here today for follow-up and denies any new issues.    REVIEW OF SYSTEMS:    Review of Systems   Constitutional:  Positive for fatigue. Negative for chills and fever.   HENT:  Negative for congestion, drooling, ear discharge, rhinorrhea, sinus pressure and tinnitus.    Eyes:  Negative for photophobia, pain and discharge.   Respiratory:  Negative for apnea, choking and stridor.    Cardiovascular:  Negative for palpitations.   Gastrointestinal:  Negative for abdominal distention, abdominal  "pain and anal bleeding.   Endocrine: Negative for polydipsia and polyphagia.   Genitourinary:  Negative for decreased urine volume, flank pain and genital sores.   Musculoskeletal:  Negative for gait problem, neck pain and neck stiffness.   Skin:  Negative for color change, rash and wound.   Neurological:  Positive for weakness. Negative for tremors, seizures, syncope, facial asymmetry and speech difficulty.   Hematological:  Negative for adenopathy.   Psychiatric/Behavioral:  Negative for agitation, confusion, hallucinations and self-injury. The patient is not hyperactive.        OBJECTIVE:    Vitals:    10/11/24 1031   BP: 143/72   Pulse: 62   Temp: 98.6 °F (37 °C)   SpO2: 96%   Weight: 108 kg (238 lb)   Height: 172.7 cm (67.99\")   PainSc: 0-No pain         Body mass index is 36.2 kg/m².    ECOG  (1) Restricted in physically strenuous activity, ambulatory and able to do work of light nature    Physical Exam  Vitals and nursing note reviewed.   Constitutional:       General: She is not in acute distress.     Appearance: She is not diaphoretic.   HENT:      Head: Normocephalic and atraumatic.   Eyes:      General: No scleral icterus.        Right eye: No discharge.         Left eye: No discharge.      Conjunctiva/sclera: Conjunctivae normal.   Neck:      Thyroid: No thyromegaly.   Cardiovascular:      Rate and Rhythm: Normal rate and regular rhythm.      Heart sounds: Normal heart sounds.      No friction rub. No gallop.   Pulmonary:      Effort: Pulmonary effort is normal. No respiratory distress.      Breath sounds: No stridor. Rhonchi present. No wheezing.   Abdominal:      General: Bowel sounds are normal.      Palpations: Abdomen is soft. There is no mass.      Tenderness: There is no abdominal tenderness. There is no guarding or rebound.   Musculoskeletal:         General: No tenderness. Normal range of motion.      Cervical back: Normal range of motion and neck supple.   Lymphadenopathy:      Cervical: No " cervical adenopathy.   Skin:     General: Skin is warm.      Findings: No erythema or rash.   Neurological:      Mental Status: She is alert and oriented to person, place, and time.      Motor: No abnormal muscle tone.   Psychiatric:         Behavior: Behavior normal.     I have reexamined the patient and the results are consistent with the previously documented exam. Laury Castillo MD      RECENT LABS      WBC   Date Value Ref Range Status   10/11/2024 8.23 3.40 - 10.80 10*3/mm3 Final     RBC   Date Value Ref Range Status   10/11/2024 4.08 3.77 - 5.28 10*6/mm3 Final     Hemoglobin   Date Value Ref Range Status   10/11/2024 13.1 12.0 - 15.9 g/dL Final     Hematocrit   Date Value Ref Range Status   10/11/2024 40.3 34.0 - 46.6 % Final     MCV   Date Value Ref Range Status   10/11/2024 98.8 (H) 79.0 - 97.0 fL Final     MCH   Date Value Ref Range Status   10/11/2024 32.1 26.6 - 33.0 pg Final     MCHC   Date Value Ref Range Status   10/11/2024 32.5 31.5 - 35.7 g/dL Final     RDW   Date Value Ref Range Status   10/11/2024 12.3 12.3 - 15.4 % Final     RDW-SD   Date Value Ref Range Status   10/11/2024 43.2 37.0 - 54.0 fl Final     MPV   Date Value Ref Range Status   10/11/2024 9.0 6.0 - 12.0 fL Final     Platelets   Date Value Ref Range Status   10/11/2024 154 140 - 450 10*3/mm3 Final     Neutrophil %   Date Value Ref Range Status   10/11/2024 62.6 42.7 - 76.0 % Final     Lymphocyte %   Date Value Ref Range Status   10/11/2024 25.6 19.6 - 45.3 % Final     Monocyte %   Date Value Ref Range Status   10/11/2024 6.9 5.0 - 12.0 % Final     Eosinophil %   Date Value Ref Range Status   10/11/2024 3.8 0.3 - 6.2 % Final     Basophil %   Date Value Ref Range Status   10/11/2024 1.1 0.0 - 1.5 % Final     Immature Grans %   Date Value Ref Range Status   05/12/2024 0.8 (H) 0.0 - 0.5 % Final     Neutrophils, Absolute   Date Value Ref Range Status   10/11/2024 5.15 1.70 - 7.00 10*3/mm3 Final     Lymphocytes, Absolute   Date Value  Ref Range Status   10/11/2024 2.11 0.70 - 3.10 10*3/mm3 Final     Monocytes, Absolute   Date Value Ref Range Status   10/11/2024 0.57 0.10 - 0.90 10*3/mm3 Final     Eosinophils, Absolute   Date Value Ref Range Status   10/11/2024 0.31 0.00 - 0.40 10*3/mm3 Final     Basophils, Absolute   Date Value Ref Range Status   10/11/2024 0.09 0.00 - 0.20 10*3/mm3 Final     Immature Grans, Absolute   Date Value Ref Range Status   05/12/2024 0.08 (H) 0.00 - 0.05 10*3/mm3 Final     nRBC   Date Value Ref Range Status   05/12/2024 0.0 0.0 - 0.2 /100 WBC Final       Lab Results   Component Value Date    GLUCOSE 109 (H) 06/18/2024    BUN 12 06/18/2024    CREATININE 0.63 06/18/2024    BCR 19.0 06/18/2024    K 3.8 06/18/2024    CO2 34.9 (H) 06/18/2024    CALCIUM 9.4 06/18/2024    ALBUMIN 3.7 05/13/2024    AST 15 05/13/2024    ALT 51 (H) 05/13/2024         Assessment & Plan     There are no diagnoses linked to this encounter.        ASSESSMENT:    Adenocarcinoma of the lung Presenting as a stage IA3.  Status post ION bronchoscopy/EBUS.  Status post SBRT to lung cancer  Polycythemia: Hemoglobin 18.2 g/dL/hematocrit 61.9, normal WBC.  EPO is low at 2.5 range is 2.6-18.5 worrisome for myeloproliferative disease..  JAK2 negative, MPL panel was negative hemoglobin is down to 12 g per DL  Chronic hypoxemia: Now on oxygen which may have helped the polycythemia  Thrombocytopenia: No baseline.  In the setting of cirrhosis and splenomegaly.  Will assess for nutritional deficiencies.  Platelets have normalized 242,000.  Normal B12.  Folate low at 4.27.  Continue folic acid 1 mg p.o. daily x 3 months.  Iatrogenic elevated iron levels.  HFE analysis did not show any mutation.  Patient has been asked to discontinue oral iron supplementation.  Cirrhosis of the liver: New diagnosis, seen on Ultrasound of the abdomen.  Referred to GI.  Appointment is pending  Folate deficiency patient is on folic acid replacement therapy.  1 mg p.o. daily  History of  CHF: Patient encouraged to follow-up with cardiologist  Posthospital visit     PLANS      Completed SBRT to lung malignancy  CT scan pending next week  Comprehensive MPL panel was negative  Referred to GI for possible cirrhosis.  Patient encouraged to  Repeat iron studies in approximately 6 months.  Hemoglobin 13.1 g per DL  Follow-up in 3 months  All questions answered              I spent 30 total minutes, face-to-face, caring for Jeanne today. 90% of this time involved counseling and/or coordination of care as documented within this note.

## 2024-10-11 ENCOUNTER — LAB (OUTPATIENT)
Dept: LAB | Facility: HOSPITAL | Age: 64
End: 2024-10-11
Payer: MEDICARE

## 2024-10-11 ENCOUNTER — OFFICE VISIT (OUTPATIENT)
Dept: ONCOLOGY | Facility: CLINIC | Age: 64
End: 2024-10-11
Payer: MEDICARE

## 2024-10-11 ENCOUNTER — HOSPITAL ENCOUNTER (OUTPATIENT)
Dept: PET IMAGING | Facility: HOSPITAL | Age: 64
Discharge: HOME | End: 2024-10-11
Payer: MEDICARE

## 2024-10-11 VITALS
BODY MASS INDEX: 36.07 KG/M2 | SYSTOLIC BLOOD PRESSURE: 143 MMHG | HEIGHT: 68 IN | DIASTOLIC BLOOD PRESSURE: 72 MMHG | WEIGHT: 238 LBS | OXYGEN SATURATION: 96 % | TEMPERATURE: 98.6 F | HEART RATE: 62 BPM

## 2024-10-11 DIAGNOSIS — R91.8 LUNG MASS: Primary | ICD-10-CM

## 2024-10-11 DIAGNOSIS — C34.11 MALIGNANT NEOPLASM OF UPPER LOBE OF RIGHT LUNG: ICD-10-CM

## 2024-10-11 LAB
BASOPHILS # BLD AUTO: 0.09 10*3/MM3 (ref 0–0.2)
BASOPHILS NFR BLD AUTO: 1.1 % (ref 0–1.5)
DEPRECATED RDW RBC AUTO: 43.2 FL (ref 37–54)
EOSINOPHIL # BLD AUTO: 0.31 10*3/MM3 (ref 0–0.4)
EOSINOPHIL NFR BLD AUTO: 3.8 % (ref 0.3–6.2)
ERYTHROCYTE [DISTWIDTH] IN BLOOD BY AUTOMATED COUNT: 12.3 % (ref 12.3–15.4)
HCT VFR BLD AUTO: 40.3 % (ref 34–46.6)
HGB BLD-MCNC: 13.1 G/DL (ref 12–15.9)
HOLD SPECIMEN: NORMAL
HOLD SPECIMEN: NORMAL
LYMPHOCYTES # BLD AUTO: 2.11 10*3/MM3 (ref 0.7–3.1)
LYMPHOCYTES NFR BLD AUTO: 25.6 % (ref 19.6–45.3)
MCH RBC QN AUTO: 32.1 PG (ref 26.6–33)
MCHC RBC AUTO-ENTMCNC: 32.5 G/DL (ref 31.5–35.7)
MCV RBC AUTO: 98.8 FL (ref 79–97)
MONOCYTES # BLD AUTO: 0.57 10*3/MM3 (ref 0.1–0.9)
MONOCYTES NFR BLD AUTO: 6.9 % (ref 5–12)
NEUTROPHILS NFR BLD AUTO: 5.15 10*3/MM3 (ref 1.7–7)
NEUTROPHILS NFR BLD AUTO: 62.6 % (ref 42.7–76)
PLATELET # BLD AUTO: 154 10*3/MM3 (ref 140–450)
PMV BLD AUTO: 9 FL (ref 6–12)
RBC # BLD AUTO: 4.08 10*6/MM3 (ref 3.77–5.28)
WBC NRBC COR # BLD AUTO: 8.23 10*3/MM3 (ref 3.4–10.8)

## 2024-10-11 PROCEDURE — 36415 COLL VENOUS BLD VENIPUNCTURE: CPT

## 2024-10-11 PROCEDURE — 85025 COMPLETE CBC W/AUTO DIFF WBC: CPT

## 2024-10-11 PROCEDURE — 71250 CT THORAX DX C-: CPT

## 2024-10-11 RX ORDER — CHOLECALCIFEROL (VITAMIN D3) 25 MCG
1000 TABLET ORAL DAILY
COMMUNITY

## 2024-10-11 RX ORDER — NYSTATIN 100000 [USP'U]/G
POWDER TOPICAL
COMMUNITY
Start: 2024-09-16

## 2024-10-14 NOTE — PROGRESS NOTES
[facility] Radiation Oncology   Follow Up    Chief Complaint  Adenocarcinoma of the RLL       Diagnosis: Adenocarcinoma of the RLL       Overall Stage IA3     cT1c: 2.8cm on PET CT  cN0: per PET Chest and FNA Pathology  cM0: per PET CT      Radiation Completion Date: 7/17/2024        Prescription:      Site: RLL  Laterality: Right  Total Dose: 5000cGy  Dose per Fraction: 1000cGy  Total Fractions: 5  Daily or BID:  QOD  Modality: Photon  Technique: SBRT (2-5fx)  Bolus: No      Interval History:    Jeanne King presents for a 3 month follow up status post XRT. She was last seen in our office on 07/17/2024 upon completion of radiation therapy treatment. She follows with Dr. Castillo, and was last seen on 10/11/2024. Their plan is to is await results of CT scan, and follow up in 3 months or earlier as needed.  She reports stability with regards to her underlying COPD.  She has no new or concerning radiation related toxicities, with possible exception of some heartburn which could be related to prior radiation. She has not taken anything for these symptoms.       Imaging:      CT Chest 10/11/2024     IMPRESSION:  Spiculated right lung nodule has significantly decreased from prior exam consistent with positive treatment response. No evidence of progression in the chest.  Underlying emphysema with chronic areas of linear atelectasis versus scar.  Aortic and coronary atherosclerotic disease.  Dilated main pulmonary artery which could reflect pulmonary arterial hypertension in the right clinical setting.      Pathology:      No new relevant pathology      Labs:    Lab Results   Component Value Date    CREATININE 0.63 06/18/2024             Problem List:  Patient Active Problem List   Diagnosis    Acute hypoxic respiratory failure    Lung mass    Chronic obstructive pulmonary disease with acute exacerbation    Bipolar II disorder    Bronchitis    Cat bite of left hand    Chronic sinusitis    Depressive disorder    Hypoxemia     Pulmonary edema    Current smoker          Medications:  Current Outpatient Medications on File Prior to Visit   Medication Sig Dispense Refill    acetaZOLAMIDE (DIAMOX) 250 MG tablet Take 1 tablet by mouth Daily. 30 tablet 0    albuterol sulfate  (90 Base) MCG/ACT inhaler Inhale 2 puffs Every 4 (Four) Hours As Needed for Wheezing.      ALPRAZolam (XANAX) 1 MG tablet Take 1 tablet by mouth 3 (Three) Times a Day As Needed for Anxiety.      Cholecalciferol 25 MCG (1000 UT) tablet Take 1 tablet by mouth Daily.      diphenhydrAMINE (Benadryl Allergy) 25 MG tablet       FLUoxetine (PROzac) 10 MG capsule Take 1 capsule by mouth Daily.      FLUoxetine (PROzac) 20 MG capsule Take 1 capsule by mouth Daily.      folic acid (FOLVITE) 1 MG tablet Take 1 tablet by mouth Daily. 30 tablet 0    furosemide (LASIX) 40 MG tablet Take 1 tablet by mouth Daily. 30 tablet 0    ipratropium-albuterol (DUO-NEB) 0.5-2.5 mg/3 ml nebulizer Take 3 mL by nebulization Every 4 (Four) Hours As Needed for Shortness of Air or Wheezing. Copd j44.9 360 mL 3    Magnesium Oxide -Mg Supplement 500 MG tablet Take 1 tablet by mouth Daily.      metoprolol succinate XL (TOPROL-XL) 25 MG 24 hr tablet Take 1 tablet by mouth Every 12 (Twelve) Hours. 60 tablet 0    Potassium Gluconate 550 (90 K) MG tablet 595 mg.      sennosides-docusate (PERICOLACE) 8.6-50 MG per tablet Take 2 tablets by mouth 2 (Two) Times a Day As Needed for Constipation. 30 tablet 0    budesonide (PULMICORT) 0.5 MG/2ML nebulizer solution Take 2 mL by nebulization 2 (Two) Times a Day. (Patient not taking: Reported on 10/15/2024) 120 mL 0    nystatin 206740 UNIT/GM powder APPLY 1 APPLICATION TO AFFECTED SKIN TWO TIMES PER DAY (Patient not taking: Reported on 10/15/2024)       No current facility-administered medications on file prior to visit.          Allergies:  Allergies   Allergen Reactions    Hydrocodone Unknown - Low Severity    Keflex [Cephalexin] Mental Status Change and Provider  "Review Needed    Morphine Mental Status Change    Premarin [Conjugated Estrogens] Mental Status Change           Vital Signs:  /76   Pulse 67   Temp 97.4 °F (36.3 °C) (Temporal)   Resp 20   Ht 172.7 cm (68\")   Wt 109 kg (241 lb)   SpO2 95%   BMI 36.64 kg/m²   Estimated body mass index is 36.64 kg/m² as calculated from the following:    Height as of this encounter: 172.7 cm (68\").    Weight as of this encounter: 109 kg (241 lb).  Pain Score    10/15/24 1105   PainSc: 0-No pain         ECOG: Restricted in physically strenuous activity but ambulatory and able to carry out work of a light or sedentary nature, e.g., light house work, office work = 1    Physical Exam  Vitals reviewed.   Constitutional:       General: She is not in acute distress.     Appearance: Normal appearance.   HENT:      Head: Normocephalic and atraumatic.   Eyes:      Extraocular Movements: Extraocular movements intact.      Pupils: Pupils are equal, round, and reactive to light.   Pulmonary:      Effort: Pulmonary effort is normal.      Comments: On supplemental O2  Abdominal:      General: Abdomen is flat.      Palpations: Abdomen is soft.   Musculoskeletal:      Cervical back: Normal range of motion.   Skin:     General: Skin is warm and dry.   Neurological:      General: No focal deficit present.      Mental Status: She is alert and oriented to person, place, and time.   Psychiatric:         Mood and Affect: Mood normal.         Behavior: Behavior normal.          Result Review :  The following data was reviewed by: Stevie Soto MD on 10/15/2024:  Labs: Last Creatinine   Data reviewed : Radiologic studies CT Chest             Diagnoses and all orders for this visit:    1. Adenocarcinoma of lower lobe of right lung (Primary)  -     CT Chest Without Contrast Diagnostic; Future    Other orders  -     pantoprazole (Protonix) 20 MG EC tablet; Take 1 tablet by mouth Daily.  Dispense: 90 tablet; Refill: 1        Assessment:    Jeanne " Fernando presents for a 3 month follow up status post XRT. She was last seen in our office on 07/17/2024 upon completion of radiation therapy treatment. She follows with Dr. Castillo, and was last seen on 10/11/2024. Their plan is to is await results of CT scan, and follow up in 3 months or earlier as needed.  She reports stability with regards to her underlying COPD.  She has no new or concerning radiation related toxicities, with possible exception of some heartburn which could be related to prior radiation. She has not taken anything for these symptoms.     I met with the patient and reviewed the results of her most recent CT chest in detail.  Overall, this is consistent with significant reduction in her treated lesion with no new or concerning lesions.  She can follow-up with me in 3 months with repeat CT chest.  I ordered pantoprazole 20 mg to help with her heartburn symptoms.  Will continue to monitor otherwise.  We will help her get established with PCP as her prior PCP is no longer practicing.      Plan:    -Follow-up in 3 months with CT chest  -Pantoprazole 20 mg daily for heartburn  -Will help with PCP referral       I spent 30 minutes caring for Jeanne on this date of service. This time includes time spent by me in the following activities:preparing for the visit, reviewing tests, obtaining and/or reviewing a separately obtained history, documenting information in the medical record, independently interpreting results and communicating that information with the patient/family/caregiver, and care coordination  Follow Up   No follow-ups on file.  Patient was given instructions and counseling regarding her condition or for health maintenance advice. Please see specific information pulled into the AVS if appropriate.     Stevie Soto MD

## 2024-10-15 ENCOUNTER — OFFICE VISIT (OUTPATIENT)
Dept: RADIATION ONCOLOGY | Facility: HOSPITAL | Age: 64
End: 2024-10-15
Payer: MEDICARE

## 2024-10-15 VITALS
RESPIRATION RATE: 20 BRPM | SYSTOLIC BLOOD PRESSURE: 176 MMHG | WEIGHT: 241 LBS | BODY MASS INDEX: 36.53 KG/M2 | HEART RATE: 67 BPM | TEMPERATURE: 97.4 F | HEIGHT: 68 IN | OXYGEN SATURATION: 95 % | DIASTOLIC BLOOD PRESSURE: 76 MMHG

## 2024-10-15 DIAGNOSIS — Z75.8 DOES NOT HAVE PRIMARY CARE PROVIDER: ICD-10-CM

## 2024-10-15 DIAGNOSIS — C34.31 ADENOCARCINOMA OF LOWER LOBE OF RIGHT LUNG: Primary | ICD-10-CM

## 2024-10-15 PROCEDURE — G0463 HOSPITAL OUTPT CLINIC VISIT: HCPCS | Performed by: STUDENT IN AN ORGANIZED HEALTH CARE EDUCATION/TRAINING PROGRAM

## 2024-10-15 RX ORDER — PANTOPRAZOLE SODIUM 20 MG/1
20 TABLET, DELAYED RELEASE ORAL DAILY
Qty: 90 TABLET | Refills: 1 | Status: SHIPPED | OUTPATIENT
Start: 2024-10-15

## 2024-11-21 PROBLEM — J81.1 PULMONARY EDEMA: Status: RESOLVED | Noted: 2024-05-06 | Resolved: 2024-11-21
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
## 2024-11-21 PROBLEM — C34.91 ADENOCARCINOMA OF RIGHT LUNG: Status: ACTIVE | Noted: 2024-11-21

## 2024-11-21 PROBLEM — R09.02 HYPOXEMIA: Status: RESOLVED | Noted: 2024-05-06 | Resolved: 2024-11-21

## 2024-11-21 PROBLEM — J40 BRONCHITIS: Status: RESOLVED | Noted: 2021-08-27 | Resolved: 2024-11-21

## 2024-11-21 PROBLEM — J96.01 ACUTE HYPOXIC RESPIRATORY FAILURE: Status: RESOLVED | Noted: 2024-05-07 | Resolved: 2024-11-21

## 2024-11-21 PROBLEM — S61.452A CAT BITE OF LEFT HAND: Status: RESOLVED | Noted: 2019-02-22 | Resolved: 2024-11-21

## 2025-01-09 ENCOUNTER — TELEPHONE (OUTPATIENT)
Dept: ONCOLOGY | Facility: CLINIC | Age: 65
End: 2025-01-09

## 2025-01-09 NOTE — TELEPHONE ENCOUNTER
Caller: SUKH BOWDEN    Relationship to patient: Emergency Contact    Best call back number: [phone]    Type of visit: LAB AND FOLLOW UP    Requested date: LATE MORNING - EARLY AFTERNOON AFTER 01/15     Additional notes: PLEASE CALL TO SCHEDULE.

## 2025-01-13 ENCOUNTER — TELEPHONE (OUTPATIENT)
Dept: ONCOLOGY | Facility: CLINIC | Age: 65
End: 2025-01-13
Payer: MEDICARE

## 2025-02-03 ENCOUNTER — LAB (OUTPATIENT)
Dept: LAB | Facility: HOSPITAL | Age: 65
End: 2025-02-03
Payer: MEDICARE

## 2025-02-03 ENCOUNTER — OFFICE VISIT (OUTPATIENT)
Dept: ONCOLOGY | Facility: CLINIC | Age: 65
End: 2025-02-03
Payer: MEDICARE

## 2025-02-03 VITALS
HEIGHT: 68 IN | OXYGEN SATURATION: 97 % | RESPIRATION RATE: 20 BRPM | DIASTOLIC BLOOD PRESSURE: 80 MMHG | WEIGHT: 245 LBS | BODY MASS INDEX: 37.13 KG/M2 | TEMPERATURE: 98.3 F | HEART RATE: 70 BPM | SYSTOLIC BLOOD PRESSURE: 149 MMHG

## 2025-02-03 DIAGNOSIS — K74.69 OTHER CIRRHOSIS OF LIVER: ICD-10-CM

## 2025-02-03 DIAGNOSIS — R91.8 LUNG MASS: Primary | ICD-10-CM

## 2025-02-03 DIAGNOSIS — R79.0 RAISED SERUM IRON: ICD-10-CM

## 2025-02-03 LAB
BASOPHILS # BLD AUTO: 0.04 10*3/MM3 (ref 0–0.2)
BASOPHILS NFR BLD AUTO: 0.4 % (ref 0–1.5)
DEPRECATED RDW RBC AUTO: 47.2 FL (ref 37–54)
EOSINOPHIL # BLD AUTO: 0.35 10*3/MM3 (ref 0–0.4)
EOSINOPHIL NFR BLD AUTO: 3.3 % (ref 0.3–6.2)
ERYTHROCYTE [DISTWIDTH] IN BLOOD BY AUTOMATED COUNT: 13.7 % (ref 12.3–15.4)
HCT VFR BLD AUTO: 37.7 % (ref 34–46.6)
HGB BLD-MCNC: 12 G/DL (ref 12–15.9)
HOLD SPECIMEN: NORMAL
HOLD SPECIMEN: NORMAL
LYMPHOCYTES # BLD AUTO: 1.64 10*3/MM3 (ref 0.7–3.1)
LYMPHOCYTES NFR BLD AUTO: 15.7 % (ref 19.6–45.3)
MCH RBC QN AUTO: 30.9 PG (ref 26.6–33)
MCHC RBC AUTO-ENTMCNC: 31.8 G/DL (ref 31.5–35.7)
MCV RBC AUTO: 97.2 FL (ref 79–97)
MONOCYTES # BLD AUTO: 0.46 10*3/MM3 (ref 0.1–0.9)
MONOCYTES NFR BLD AUTO: 4.4 % (ref 5–12)
NEUTROPHILS NFR BLD AUTO: 7.97 10*3/MM3 (ref 1.7–7)
NEUTROPHILS NFR BLD AUTO: 76.2 % (ref 42.7–76)
PLATELET # BLD AUTO: 158 10*3/MM3 (ref 140–450)
PMV BLD AUTO: 9.2 FL (ref 6–12)
RBC # BLD AUTO: 3.88 10*6/MM3 (ref 3.77–5.28)
WBC NRBC COR # BLD AUTO: 10.46 10*3/MM3 (ref 3.4–10.8)

## 2025-02-03 PROCEDURE — 1126F AMNT PAIN NOTED NONE PRSNT: CPT | Performed by: INTERNAL MEDICINE

## 2025-02-03 PROCEDURE — 84466 ASSAY OF TRANSFERRIN: CPT | Performed by: INTERNAL MEDICINE

## 2025-02-03 PROCEDURE — 83540 ASSAY OF IRON: CPT | Performed by: INTERNAL MEDICINE

## 2025-02-03 PROCEDURE — 99214 OFFICE O/P EST MOD 30 MIN: CPT | Performed by: INTERNAL MEDICINE

## 2025-02-03 PROCEDURE — 85025 COMPLETE CBC W/AUTO DIFF WBC: CPT

## 2025-02-03 PROCEDURE — 36415 COLL VENOUS BLD VENIPUNCTURE: CPT

## 2025-02-03 PROCEDURE — 82728 ASSAY OF FERRITIN: CPT | Performed by: INTERNAL MEDICINE

## 2025-02-03 RX ORDER — MICONAZOLE NITRATE 20 MG/G
POWDER TOPICAL
COMMUNITY
Start: 2024-11-26

## 2025-02-03 RX ORDER — ATORVASTATIN CALCIUM 10 MG/1
TABLET, FILM COATED ORAL
COMMUNITY
Start: 2024-10-28

## 2025-02-03 RX ORDER — MELATONIN 3 MG
TABLET ORAL
COMMUNITY
Start: 2024-12-01

## 2025-02-03 NOTE — PROGRESS NOTES
Hematology/Oncology Outpatient Follow Up    PATIENT NAME:Jeanne King  :1960  MRN: [id number]  PRIMARY CARE PHYSICIAN: Alea Quevedo MD  REFERRING PHYSICIAN: Alea Quevedo MD    Chief Complaint   Patient presents with    Follow-up     Adenocarcinoma of the lung        HISTORY OF PRESENT ILLNESS:     Jeanne King is a 64 y.o. female who [facility].  Past medical history of hypertension, COPD.  She reported that she has not felt well for some time.  She does not like going to the doctor and is anxious about seeking medical treatment.  She reported having shortness of breath and cough that she attributed to lingering effects of COVID infection and did not seek medical treatment.  [facility] and was negative for pulmonary embolism, did show a right lower lobe spiculated mass lesion measuring 2.3 cm and a more distal lesion measuring 1.7 cm along with mediastinal lymphadenopathy with a right hilar node measuring 2.1 cm.  Upper abdomen showed a cirrhotic enlarged liver hence splenomegaly along with abdominal ascites.     24  Hematology/Oncology was consulted for lung lesions suspicious for primary lung malignancy.  The patient reports that she is a current smoker with a 50-year history of smoking on average 1 and half packs per day.  Her family history is positive for lung cancer with her brother and liver cancer and another brother.  She reports that she is not up-to-date with any routine screenings for malignancy.     She has been found to have right lower lobe spiculated mass measuring 2.3 cm, another mass that measures 1.7 cm with mediastinal lymphadenopathy.  There is also evidence of cirrhosis of the liver with portal hypertension.  We have been asked to see patient due to lung lesions.      5/10/2024 patient had right hilar node and  subcarinal lymph node with no evidence of malignancy.  The right upper lobe bile was negative for malignancy.  6/8/2024: Patient had a PET CT scan which basically revealed no hypermetabolic adenopathy in the neck.  Within the superior segment of the right lower lobe extending to involve the right pulmonary fissure there is a 2.5 cm x 2.8 cm hypermetabolic with SUV of 8.6 consistent with malignancy.  There is no hypermetabolic mediastinal or hilar adenopathy.  No additional hypermetabolic pulmonary nodule was seen.  Dilated main pulmonary artery measuring up to 4.2 cm seen in pulmonary hypertension severely mildly enlarged mediastinal lymph nodes again noted but not hypermetabolic.  There is a right paratracheal node that measures 1.4 cm.  She has bilateral adrenal adenomas.  There is no hypermetabolic bone lesions to indicate metastatic disease.  6/18/2024 patient underwent navigational bronchoscopy with endobronchial ultrasound and fine-needle aspiration of lymph node x 2 and iron robot with fine-needle aspiration of the right lower lobe biopsy, final pathology revealed positive for malignancy non-small cell favoring invasive moderately differentiated pulmonary adenocarcinoma positive for TTF-1, CK7 lymph node biopsy was negative for malignancy  Patient was seen by Dr. Paris and determined not to be a surgical candidate  6/24/2024 patient was seen by Dr. Soto and determined to be a candidate for SRS  Past Medical History:   Diagnosis Date    Hypertension        Past Surgical History:   Procedure Laterality Date    BRONCHOSCOPY N/A 5/10/2024    Procedure: BRONCHOSCOPY WITH BRONCHOALVEOLAR LAVAGE AND ENDOBRONCHIAL ULTRASOUND WITH FINE NEEDLE ASPIRATION x2;  Surgeon: Bessy Mcguire MD;  Location: [facility];  Service: Pulmonary;  Laterality: N/A;  lung mass    BRONCHOSCOPY WITH ION ROBOTIC ASSIST N/A 6/18/2024    Procedure: BRONCHOSCOPY NAVIGATION WITH ENDOBRONCHIAL ULTRASOUND with fine needle aspiration of  lymph node x2  AND ION ROBOT with fine needle aspiration of right lower lobe lung mass;cryo tissue biopsy of right lower lobe lung mass;  Surgeon: Rene Smith MD;  Location: [facility] ENDOSCOPY;  Service: Robotics - Pulmonary;  Laterality: N/A;  right lower lobe lung mass    HYSTERECTOMY           Current Outpatient Medications:     atorvastatin (LIPITOR) 10 MG tablet, , Disp: , Rfl:     melatonin 3-10 MG tablet tablet, , Disp: , Rfl:     Micro Guard 2 % powder, apply to the affected area(s) twice daily, Disp: , Rfl:     acetaZOLAMIDE (DIAMOX) 250 MG tablet, Take 1 tablet by mouth Daily., Disp: 30 tablet, Rfl: 0    albuterol sulfate  (90 Base) MCG/ACT inhaler, Inhale 2 puffs Every 4 (Four) Hours As Needed for Wheezing., Disp: , Rfl:     ALPRAZolam (XANAX) 1 MG tablet, Take 1 tablet by mouth 3 (Three) Times a Day As Needed for Anxiety., Disp: , Rfl:     budesonide (PULMICORT) 0.5 MG/2ML nebulizer solution, Take 2 mL by nebulization 2 (Two) Times a Day. (Patient not taking: Reported on 10/11/2024), Disp: 120 mL, Rfl: 0    Cholecalciferol 25 MCG (1000 UT) tablet, Take 1 tablet by mouth Daily., Disp: , Rfl:     diphenhydrAMINE (Benadryl Allergy) 25 MG tablet, , Disp: , Rfl:     FLUoxetine (PROzac) 10 MG capsule, Take 1 capsule by mouth Daily., Disp: , Rfl:     FLUoxetine (PROzac) 20 MG capsule, Take 1 capsule by mouth Daily., Disp: , Rfl:     folic acid (FOLVITE) 1 MG tablet, Take 1 tablet by mouth Daily., Disp: 30 tablet, Rfl: 0    furosemide (LASIX) 40 MG tablet, Take 1 tablet by mouth Daily., Disp: 30 tablet, Rfl: 0    ipratropium-albuterol (DUO-NEB) 0.5-2.5 mg/3 ml nebulizer, Take 3 mL by nebulization Every 4 (Four) Hours As Needed for Shortness of Air or Wheezing. Copd j44.9, Disp: 360 mL, Rfl: 3    Magnesium Oxide -Mg Supplement 500 MG tablet, Take 1 tablet by mouth Daily., Disp: , Rfl:     metoprolol succinate XL (TOPROL-XL) 25 MG 24 hr tablet, Take 1 tablet by mouth Every 12 (Twelve) Hours., Disp: 60  tablet, Rfl: 0    nystatin 502499 UNIT/GM powder, APPLY 1 APPLICATION TO AFFECTED SKIN TWO TIMES PER DAY (Patient not taking: Reported on 10/11/2024), Disp: , Rfl:     pantoprazole (Protonix) 20 MG EC tablet, Take 1 tablet by mouth Daily., Disp: 90 tablet, Rfl: 1    Potassium Gluconate 550 (90 K) MG tablet, 595 mg., Disp: , Rfl:     sennosides-docusate (PERICOLACE) 8.6-50 MG per tablet, Take 2 tablets by mouth 2 (Two) Times a Day As Needed for Constipation., Disp: 30 tablet, Rfl: 0    Allergies   Allergen Reactions    Hydrocodone Unknown - Low Severity    Keflex [Cephalexin] Mental Status Change and Provider Review Needed    Morphine Mental Status Change    Premarin [Conjugated Estrogens] Mental Status Change       Family History   Problem Relation Age of Onset    No Known Problems Mother     No Known Problems Father     No Known Problems Sister     No Known Problems Brother        Cancer-related family history is not on file.    Social History     Tobacco Use    Smoking status: Former     Current packs/day: 1.50     Average packs/day: 1.5 packs/day for 54.1 years (81.1 ttl pk-yrs)     Types: Cigarettes     Start date: 1971    Smokeless tobacco: Never   Vaping Use    Vaping status: Never Used   Substance Use Topics    Alcohol use: Never    Drug use: Never     I have reviewed and confirmed the accuracy of the patient's history: Chief complaint, HPI, ROS, and Subjective as entered by the MA/LPN/RN. Laury Castillo MD 02/03/25  2/3/25          SUBJECTIVE:     Patient is here today for routine follow-up and does not have any new issues.  She remains on 3 L of oxygen continuous    Patient denies any new issues    Patient is  here today for follow-up denies any new issues.  She is scheduled for CT of the chest tomorrow.        REVIEW OF SYSTEMS:    Review of Systems   Constitutional:  Positive for fatigue. Negative for chills and fever.   HENT:  Negative for congestion, drooling, ear discharge, rhinorrhea, sinus  "pressure and tinnitus.    Eyes:  Negative for photophobia, pain and discharge.   Respiratory:  Negative for apnea, choking and stridor.    Cardiovascular:  Negative for palpitations.   Gastrointestinal:  Negative for abdominal distention, abdominal pain and anal bleeding.   Endocrine: Negative for polydipsia and polyphagia.   Genitourinary:  Negative for decreased urine volume, flank pain and genital sores.   Musculoskeletal:  Negative for gait problem, neck pain and neck stiffness.   Skin:  Negative for color change, rash and wound.   Neurological:  Positive for weakness. Negative for tremors, seizures, syncope, facial asymmetry and speech difficulty.   Hematological:  Negative for adenopathy.   Psychiatric/Behavioral:  Negative for agitation, confusion, hallucinations and self-injury. The patient is not hyperactive.        OBJECTIVE:    Vitals:    02/03/25 1323   BP: 149/80   Pulse: 70   Resp: 20   Temp: 98.3 °F (36.8 °C)   TempSrc: Infrared   SpO2: 97%   Weight: 111 kg (245 lb)   Height: 172.7 cm (68\")   PainSc: 0-No pain           Body mass index is 37.25 kg/m².    ECOG  (1) Restricted in physically strenuous activity, ambulatory and able to do work of light nature    Physical Exam  Vitals and nursing note reviewed.   Constitutional:       General: She is not in acute distress.     Appearance: She is not diaphoretic.   HENT:      Head: Normocephalic and atraumatic.   Eyes:      General: No scleral icterus.        Right eye: No discharge.         Left eye: No discharge.      Conjunctiva/sclera: Conjunctivae normal.   Neck:      Thyroid: No thyromegaly.   Cardiovascular:      Rate and Rhythm: Normal rate and regular rhythm.      Heart sounds: Normal heart sounds.      No friction rub. No gallop.   Pulmonary:      Effort: Pulmonary effort is normal. No respiratory distress.      Breath sounds: No stridor. Rhonchi present. No wheezing.   Abdominal:      General: Bowel sounds are normal.      Palpations: Abdomen is " soft. There is no mass.      Tenderness: There is no abdominal tenderness. There is no guarding or rebound.   Musculoskeletal:         General: No tenderness. Normal range of motion.      Cervical back: Normal range of motion and neck supple.   Lymphadenopathy:      Cervical: No cervical adenopathy.   Skin:     General: Skin is warm.      Findings: No erythema or rash.   Neurological:      Mental Status: She is alert and oriented to person, place, and time.      Motor: No abnormal muscle tone.   Psychiatric:         Behavior: Behavior normal.         I have reexamined the patient and the results are consistent with the previously documented exam. Laury Castillo MD        RECENT LABS      WBC   Date Value Ref Range Status   02/03/2025 10.46 3.40 - 10.80 10*3/mm3 Final     RBC   Date Value Ref Range Status   02/03/2025 3.88 3.77 - 5.28 10*6/mm3 Final     Hemoglobin   Date Value Ref Range Status   02/03/2025 12.0 12.0 - 15.9 g/dL Final     Hematocrit   Date Value Ref Range Status   02/03/2025 37.7 34.0 - 46.6 % Final     MCV   Date Value Ref Range Status   02/03/2025 97.2 (H) 79.0 - 97.0 fL Final     MCH   Date Value Ref Range Status   02/03/2025 30.9 26.6 - 33.0 pg Final     MCHC   Date Value Ref Range Status   02/03/2025 31.8 31.5 - 35.7 g/dL Final     RDW   Date Value Ref Range Status   02/03/2025 13.7 12.3 - 15.4 % Final     RDW-SD   Date Value Ref Range Status   02/03/2025 47.2 37.0 - 54.0 fl Final     MPV   Date Value Ref Range Status   02/03/2025 9.2 6.0 - 12.0 fL Final     Platelets   Date Value Ref Range Status   02/03/2025 158 140 - 450 10*3/mm3 Final     Neutrophil %   Date Value Ref Range Status   02/03/2025 76.2 (H) 42.7 - 76.0 % Final     Lymphocyte %   Date Value Ref Range Status   02/03/2025 15.7 (L) 19.6 - 45.3 % Final     Monocyte %   Date Value Ref Range Status   02/03/2025 4.4 (L) 5.0 - 12.0 % Final     Eosinophil %   Date Value Ref Range Status   02/03/2025 3.3 0.3 - 6.2 % Final      Basophil %   Date Value Ref Range Status   02/03/2025 0.4 0.0 - 1.5 % Final     Immature Grans %   Date Value Ref Range Status   05/12/2024 0.8 (H) 0.0 - 0.5 % Final     Neutrophils, Absolute   Date Value Ref Range Status   02/03/2025 7.97 (H) 1.70 - 7.00 10*3/mm3 Final     Lymphocytes, Absolute   Date Value Ref Range Status   02/03/2025 1.64 0.70 - 3.10 10*3/mm3 Final     Monocytes, Absolute   Date Value Ref Range Status   02/03/2025 0.46 0.10 - 0.90 10*3/mm3 Final     Eosinophils, Absolute   Date Value Ref Range Status   02/03/2025 0.35 0.00 - 0.40 10*3/mm3 Final     Basophils, Absolute   Date Value Ref Range Status   02/03/2025 0.04 0.00 - 0.20 10*3/mm3 Final     Immature Grans, Absolute   Date Value Ref Range Status   05/12/2024 0.08 (H) 0.00 - 0.05 10*3/mm3 Final     nRBC   Date Value Ref Range Status   05/12/2024 0.0 0.0 - 0.2 /100 WBC Final       Lab Results   Component Value Date    GLUCOSE 109 (H) 06/18/2024    BUN 12 06/18/2024    CREATININE 0.63 06/18/2024    BCR 19.0 06/18/2024    K 3.8 06/18/2024    CO2 34.9 (H) 06/18/2024    CALCIUM 9.4 06/18/2024    ALBUMIN 3.7 05/13/2024    AST 15 05/13/2024    ALT 51 (H) 05/13/2024         Assessment & Plan     Lung mass  - CBC & Differential          ASSESSMENT:    Adenocarcinoma of the lung Presenting as a stage IA3.  Status post ION bronchoscopy/EBUS.  Surveillance CT scan scheduled 2/4/2025  Status post SBRT to lung cancer: Dr. Soto  Polycythemia: Hemoglobin 18.2 g/dL/hematocrit 61.9, normal WBC.  EPO is low at 2.5 range is 2.6-18.5 worrisome for myeloproliferative disease..  JAK2 negative, MPL panel was negative hemoglobin is down to 12 g per DL.  CBC reviewed  Chronic hypoxemia: Now on oxygen which may have helped the polycythemia  Thrombocytopenia: No baseline.  In the setting of cirrhosis and splenomegaly.  Will assess for nutritional deficiencies.  Platelets have normalized 242,000.  Normal B12.  Folate low at 4.27.  Continue folic acid 1 mg p.o. daily  x 3 months.  Iatrogenic elevated iron levels.  HFE analysis did not show any mutation.  Patient has been asked to discontinue oral iron supplementation.  Cirrhosis of the liver: New diagnosis, seen on Ultrasound of the abdomen.  Referred to GI.  Appointment is pending  Folate deficiency patient is on folic acid replacement therapy.  1 mg p.o. daily  History of CHF: Patient encouraged to follow-up with cardiologist  Posthospital visit     PLANS      Completed SBRT to lung malignancy  CT scan scheduled tomorrow to 425  Comprehensive MPL panel was negative  Referred to GI for possible cirrhosis.  Patient encouraged to follow-up with GI.  Renewed her referral  Repeat iron studies in approximately 6 months.  Hemoglobin 13.1 g per DL  Follow in 3 months  All questions answered          Electronically signed by Laury Castillo MD, 02/03/25, 5:37 PM EST.

## 2025-02-04 ENCOUNTER — HOSPITAL ENCOUNTER (OUTPATIENT)
Dept: PET IMAGING | Facility: HOSPITAL | Age: 65
Discharge: HOME | End: 2025-02-04
Admitting: STUDENT IN AN ORGANIZED HEALTH CARE EDUCATION/TRAINING PROGRAM
Payer: MEDICARE

## 2025-02-04 DIAGNOSIS — R79.0 RAISED SERUM IRON: Primary | ICD-10-CM

## 2025-02-04 DIAGNOSIS — C34.31 ADENOCARCINOMA OF LOWER LOBE OF RIGHT LUNG: ICD-10-CM

## 2025-02-04 LAB
FERRITIN SERPL-MCNC: 610 NG/ML (ref 13–150)
IRON 24H UR-MRATE: 102 MCG/DL (ref 37–145)
IRON SATN MFR SERPL: 33 % (ref 20–50)
TIBC SERPL-MCNC: 308 MCG/DL (ref 298–536)
TRANSFERRIN SERPL-MCNC: 207 MG/DL (ref 200–360)

## 2025-02-04 PROCEDURE — 71250 CT THORAX DX C-: CPT

## 2025-02-12 ENCOUNTER — TELEPHONE (OUTPATIENT)
Dept: ONCOLOGY | Facility: CLINIC | Age: 65
End: 2025-02-12
Payer: MEDICARE

## 2025-02-12 NOTE — TELEPHONE ENCOUNTER
Patient requested her most recent office note be faxed to her PCP Surekha Subramanian, records faxed 02/12/2025

## 2025-02-25 ENCOUNTER — OFFICE VISIT (OUTPATIENT)
Dept: RADIATION ONCOLOGY | Facility: HOSPITAL | Age: 65
End: 2025-02-25
Payer: MEDICARE

## 2025-02-25 DIAGNOSIS — C34.31 ADENOCARCINOMA OF LOWER LOBE OF RIGHT LUNG: Primary | ICD-10-CM

## 2025-02-25 NOTE — PROGRESS NOTES
[facility] Radiation Oncology   Telephone Follow Up    Chief Complaint  Adenocarcinoma of the RLL        Diagnosis: Adenocarcinoma of the RLL        Overall Stage IA3     cT1c: 2.8cm on PET CT  cN0: per PET Chest and FNA Pathology  cM0: per PET CT       Radiation Start Date: 7/8/2024    Radiation Completion Date: 7/17/2024        Prescription:      Site: RLL  Laterality: Right  Total Dose: 5000cGy  Dose per Fraction: 1000cGy  Total Fractions: 5  Daily or BID:  QOD  Modality: Photon  Technique: SBRT (2-5fx)  Bolus: No        Interval History:    Per the patient's request I completed this follow-up over the phone.  I confirmed the patient's identity and obtained her consent.    Jeanne King is a 64 y.o. female who was diagnosed with Stage IA adenocarcinoma of the RLL (2.8 cm). She completed 5 fraction SBRT on 7/17/2024.  The patient tolerated treatment well and she reports that she has done well in the interim.  She has no new or concerning complaints.        Imaging:      CT Chest Without Contrast Diagnostic  2/4/2025  Impression:   Continued decrease in size of right lower lobe superior segment pulmonary nodule consistent with response to therapy now 1.2 cm, previously 2.3 cm.   No new or enlarging pulmonary nodule.   No intrathoracic adenopathy.   Emphysema, cardiomegaly, and additional chronic findings above.         Pathology:      No recent pathology to review.         Labs:    Lab Results   Component Value Date    CREATININE 0.63 06/18/2024             Problem List:  Patient Active Problem List   Diagnosis    Chronic obstructive pulmonary disease with acute exacerbation    Bipolar II disorder    Chronic sinusitis    Current smoker    Adenocarcinoma of right lung          Medications:  Current Outpatient Medications on File Prior to Visit   Medication Sig Dispense Refill    acetaZOLAMIDE (DIAMOX) 250 MG tablet Take 1 tablet by mouth Daily. 30 tablet 0    albuterol sulfate  (90 Base) MCG/ACT inhaler Inhale  2 puffs Every 4 (Four) Hours As Needed for Wheezing.      ALPRAZolam (XANAX) 1 MG tablet Take 1 tablet by mouth 3 (Three) Times a Day As Needed for Anxiety.      atorvastatin (LIPITOR) 10 MG tablet       budesonide (PULMICORT) 0.5 MG/2ML nebulizer solution Take 2 mL by nebulization 2 (Two) Times a Day. (Patient not taking: Reported on 10/11/2024) 120 mL 0    Cholecalciferol 25 MCG (1000 UT) tablet Take 1 tablet by mouth Daily.      diphenhydrAMINE (Benadryl Allergy) 25 MG tablet       FLUoxetine (PROzac) 10 MG capsule Take 1 capsule by mouth Daily.      FLUoxetine (PROzac) 20 MG capsule Take 1 capsule by mouth Daily.      folic acid (FOLVITE) 1 MG tablet Take 1 tablet by mouth Daily. 30 tablet 0    furosemide (LASIX) 40 MG tablet Take 1 tablet by mouth Daily. 30 tablet 0    ipratropium-albuterol (DUO-NEB) 0.5-2.5 mg/3 ml nebulizer Take 3 mL by nebulization Every 4 (Four) Hours As Needed for Shortness of Air or Wheezing. Copd j44.9 360 mL 3    Magnesium Oxide -Mg Supplement 500 MG tablet Take 1 tablet by mouth Daily.      melatonin 3-10 MG tablet tablet       metoprolol succinate XL (TOPROL-XL) 25 MG 24 hr tablet Take 1 tablet by mouth Every 12 (Twelve) Hours. 60 tablet 0    Micro Guard 2 % powder apply to the affected area(s) twice daily      nystatin 602847 UNIT/GM powder APPLY 1 APPLICATION TO AFFECTED SKIN TWO TIMES PER DAY (Patient not taking: Reported on 10/11/2024)      pantoprazole (Protonix) 20 MG EC tablet Take 1 tablet by mouth Daily. 90 tablet 1    Potassium Gluconate 550 (90 K) MG tablet 595 mg.      sennosides-docusate (PERICOLACE) 8.6-50 MG per tablet Take 2 tablets by mouth 2 (Two) Times a Day As Needed for Constipation. 30 tablet 0     No current facility-administered medications on file prior to visit.          Allergies:  Allergies   Allergen Reactions    Hydrocodone Unknown - Low Severity    Keflex [Cephalexin] Mental Status Change and Provider Review Needed    Morphine Mental Status Change     Premarin [Conjugated Estrogens] Mental Status Change       ECOG: Restricted in physically strenuous activity but ambulatory and able to carry out work of a light or sedentary nature, e.g., light house work, office work = 1      Result Review :  The following data was reviewed by: Stevie Soto MD on 02/25/2025:  Labs: Last Creatinine   Data reviewed : Radiologic studies CT Chest             Diagnoses and all orders for this visit:    1. Adenocarcinoma of lower lobe of right lung (Primary)        Assessment:      Per the patient's request I completed this follow-up over the phone.  I confirmed the patient's identity and obtained her consent.    Jeanne King is a 64 y.o. female who was diagnosed with Stage IA adenocarcinoma of the RLL (2.8 cm). She completed 5 fraction SBRT on 7/17/2024.  The patient tolerated treatment well and she reports that she has done well in the interim.  She has no new or concerning complaints.    I discussed with the patient the results of her interval CT chest in detail.  Overall, this demonstrated interval decrease in size of her treated lesion.  There were no new or concerning lesions.  She can follow-up with me in 6 months with repeat CT chest.      Plan:    -Follow-up in 3 months with CT chest       I spent 30 minutes caring for Jeanne on this date of service. This time includes time spent by me in the following activities:preparing for the visit, reviewing tests, obtaining and/or reviewing a separately obtained history, documenting information in the medical record, independently interpreting results and communicating that information with the patient/family/caregiver, and care coordination  Follow Up   No follow-ups on file.  Patient was given instructions and counseling regarding her condition or for health maintenance advice. Please see specific information pulled into the AVS if appropriate.     Stevie Soto MD

## 2025-04-08 DIAGNOSIS — Z75.8 DOES NOT HAVE PRIMARY CARE PROVIDER: Primary | ICD-10-CM

## 2025-04-08 RX ORDER — PANTOPRAZOLE SODIUM 20 MG/1
20 TABLET, DELAYED RELEASE ORAL DAILY
Qty: 90 TABLET | Refills: 0 | Status: CANCELLED | OUTPATIENT
Start: 2025-04-08

## 2025-04-09 ENCOUNTER — TELEPHONE (OUTPATIENT)
Dept: ONCOLOGY | Facility: CLINIC | Age: 65
End: 2025-04-09

## 2025-04-09 NOTE — TELEPHONE ENCOUNTER
Caller: SUKH BOWDEN    Relationship: Emergency Contact    Best call back number: [phone]    What is the best time to reach you: ANYTIME    Who are you requesting to speak with (clinical staff, provider,  specific staff member): CLINICAL    What was the call regarding: DR TRUJILLO HAS MOVED PATIENT TO 6 MONTH F/U APPTS. THEY WANTED TO KNOW IF PATIENT COULD MOVE DR THORPE TO 6 MONTH F/U APPTS AS WELL?    PLEASE ADVISE.

## 2025-04-17 NOTE — TELEPHONE ENCOUNTER
Returned Alexandra's call. I told her that Dr. Castillo was agreeable to changing appt to 4 months if they would like, but she was not comfortable extending to 6 months. Alexandra stated that they would keep the appt as is for now and potentially discuss with Dr. Castillo further at the follow-up. No further questions or needs at this time.

## 2025-04-24 ENCOUNTER — TRANSCRIBE ORDERS (OUTPATIENT)
Dept: ADMINISTRATIVE | Facility: HOSPITAL | Age: 65
End: 2025-04-24
Payer: MEDICARE

## 2025-04-24 ENCOUNTER — OFFICE (OUTPATIENT)
Dept: URBAN - METROPOLITAN AREA CLINIC 64 | Facility: CLINIC | Age: 65
End: 2025-04-24
Payer: MEDICARE

## 2025-04-24 VITALS
HEART RATE: 58 BPM | SYSTOLIC BLOOD PRESSURE: 130 MMHG | WEIGHT: 247 LBS | DIASTOLIC BLOOD PRESSURE: 74 MMHG | HEIGHT: 68 IN

## 2025-04-24 DIAGNOSIS — R11.0 NAUSEA: ICD-10-CM

## 2025-04-24 DIAGNOSIS — R68.81 EARLY SATIETY: ICD-10-CM

## 2025-04-24 DIAGNOSIS — K74.60 UNSPECIFIED CIRRHOSIS OF LIVER: ICD-10-CM

## 2025-04-24 DIAGNOSIS — R19.4 CHANGE IN BOWEL HABIT: ICD-10-CM

## 2025-04-24 DIAGNOSIS — R12 HEARTBURN: ICD-10-CM

## 2025-04-24 DIAGNOSIS — K74.69 OTHER CIRRHOSIS OF LIVER: Primary | ICD-10-CM

## 2025-04-24 DIAGNOSIS — R19.4 ALTERED BOWEL HABITS: ICD-10-CM

## 2025-04-24 PROCEDURE — 99204 OFFICE O/P NEW MOD 45 MIN: CPT

## 2025-04-24 RX ORDER — FAMOTIDINE 40 MG/1
40 TABLET, FILM COATED ORAL
Qty: 30 | Refills: 11 | Status: ACTIVE
Start: 2025-04-24

## 2025-04-24 NOTE — PROGRESS NOTES
Hematology/Oncology Outpatient Follow Up    PATIENT NAME:Jeanne King  :1960  MRN: [id number]  PRIMARY CARE PHYSICIAN: Alea Quevedo MD  REFERRING PHYSICIAN: No ref. provider found    Chief Complaint   Patient presents with    Follow-up     Lung mass            HISTORY OF PRESENT ILLNESS:     Jeanne King is a 64 y.o. female who [facility] with [facility] of [facility] swelling.  Past medical history of hypertension, COPD.  She reported that she has not felt well for some time.  She does not like going to the doctor and is anxious about seeking medical treatment.  She reported having shortness of breath and cough that she attributed to lingering effects of COVID infection and did not seek medical treatment.  [facility] and was negative for pulmonary embolism, did show a right lower lobe spiculated mass lesion measuring 2.3 cm and a more distal lesion measuring 1.7 cm along with mediastinal lymphadenopathy with a right hilar node measuring 2.1 cm.  Upper abdomen showed a cirrhotic enlarged liver hence splenomegaly along with abdominal ascites.     24  Hematology/Oncology was consulted for lung lesions suspicious for primary lung malignancy.  The patient reports that she is a current smoker with a 50-year history of smoking on average 1 and half packs per day.  Her family history is positive for lung cancer with her brother and liver cancer and another brother.  She reports that she is not up-to-date with any routine screenings for malignancy.     She has been found to have right lower lobe spiculated mass measuring 2.3 cm, another mass that measures 1.7 cm with mediastinal lymphadenopathy.  There is also evidence of cirrhosis of the liver with portal hypertension.  We have been asked to see patient due to lung lesions.      5/10/2024 patient had right hilar node and subcarinal lymph node  with no evidence of malignancy.  The right upper lobe bile was negative for malignancy.  6/8/2024: Patient had a PET CT scan which basically revealed no hypermetabolic adenopathy in the neck.  Within the superior segment of the right lower lobe extending to involve the right pulmonary fissure there is a 2.5 cm x 2.8 cm hypermetabolic with SUV of 8.6 consistent with malignancy.  There is no hypermetabolic mediastinal or hilar adenopathy.  No additional hypermetabolic pulmonary nodule was seen.  Dilated main pulmonary artery measuring up to 4.2 cm seen in pulmonary hypertension severely mildly enlarged mediastinal lymph nodes again noted but not hypermetabolic.  There is a right paratracheal node that measures 1.4 cm.  She has bilateral adrenal adenomas.  There is no hypermetabolic bone lesions to indicate metastatic disease.  6/18/2024 patient underwent navigational bronchoscopy with endobronchial ultrasound and fine-needle aspiration of lymph node x 2 and iron robot with fine-needle aspiration of the right lower lobe biopsy, final pathology revealed positive for malignancy non-small cell favoring invasive moderately differentiated pulmonary adenocarcinoma positive for TTF-1, CK7 lymph node biopsy was negative for malignancy  Patient was seen by Dr. Paris and determined not to be a surgical candidate  6/24/2024 patient was seen by Dr. Soto and determined to be a candidate for SRS  Past Medical History:   Diagnosis Date    Hypertension        Past Surgical History:   Procedure Laterality Date    BRONCHOSCOPY N/A 5/10/2024    Procedure: BRONCHOSCOPY WITH BRONCHOALVEOLAR LAVAGE AND ENDOBRONCHIAL ULTRASOUND WITH FINE NEEDLE ASPIRATION x2;  Surgeon: Bessy Mcguire MD;  Location: [facility];  Service: Pulmonary;  Laterality: N/A;  lung mass    BRONCHOSCOPY WITH ION ROBOTIC ASSIST N/A 6/18/2024    Procedure: BRONCHOSCOPY NAVIGATION WITH ENDOBRONCHIAL ULTRASOUND with fine needle aspiration of lymph node x2  AND ION  ROBOT with fine needle aspiration of right lower lobe lung mass;cryo tissue biopsy of right lower lobe lung mass;  Surgeon: Rene Smith MD;  Location: [facility] ENDOSCOPY;  Service: Robotics - Pulmonary;  Laterality: N/A;  right lower lobe lung mass    HYSTERECTOMY           Current Outpatient Medications:     famotidine (PEPCID) 40 MG tablet, Take 1 tablet by mouth every night at bedtime., Disp: , Rfl:     acetaZOLAMIDE (DIAMOX) 250 MG tablet, Take 1 tablet by mouth Daily., Disp: 30 tablet, Rfl: 0    albuterol sulfate  (90 Base) MCG/ACT inhaler, Inhale 2 puffs Every 4 (Four) Hours As Needed for Wheezing., Disp: , Rfl:     ALPRAZolam (XANAX) 1 MG tablet, Take 1 tablet by mouth 3 (Three) Times a Day As Needed for Anxiety., Disp: , Rfl:     atorvastatin (LIPITOR) 10 MG tablet, , Disp: , Rfl:     budesonide (PULMICORT) 0.5 MG/2ML nebulizer solution, Take 2 mL by nebulization 2 (Two) Times a Day. (Patient not taking: Reported on 4/28/2025), Disp: 120 mL, Rfl: 0    Cholecalciferol 25 MCG (1000 UT) tablet, Take 1 tablet by mouth Daily., Disp: , Rfl:     diphenhydrAMINE (Benadryl Allergy) 25 MG tablet, , Disp: , Rfl:     FLUoxetine (PROzac) 10 MG capsule, Take 1 capsule by mouth Daily., Disp: , Rfl:     FLUoxetine (PROzac) 20 MG capsule, Take 1 capsule by mouth Daily., Disp: , Rfl:     folic acid (FOLVITE) 1 MG tablet, Take 1 tablet by mouth Daily., Disp: 30 tablet, Rfl: 0    furosemide (LASIX) 40 MG tablet, Take 1 tablet by mouth Daily., Disp: 30 tablet, Rfl: 0    ipratropium-albuterol (DUO-NEB) 0.5-2.5 mg/3 ml nebulizer, Take 3 mL by nebulization Every 4 (Four) Hours As Needed for Shortness of Air or Wheezing. Copd j44.9, Disp: 360 mL, Rfl: 3    Magnesium Oxide -Mg Supplement 500 MG tablet, Take 1 tablet by mouth Daily., Disp: , Rfl:     melatonin 3-10 MG tablet tablet, , Disp: , Rfl:     metoprolol succinate XL (TOPROL-XL) 25 MG 24 hr tablet, Take 1 tablet by mouth Every 12 (Twelve) Hours., Disp: 60 tablet, Rfl:  0    Micro Guard 2 % powder, apply to the affected area(s) twice daily, Disp: , Rfl:     nystatin 318540 UNIT/GM powder, APPLY 1 APPLICATION TO AFFECTED SKIN TWO TIMES PER DAY (Patient not taking: Reported on 4/28/2025), Disp: , Rfl:     pantoprazole (Protonix) 20 MG EC tablet, Take 1 tablet by mouth Daily., Disp: 90 tablet, Rfl: 1    Potassium Gluconate 550 (90 K) MG tablet, 595 mg., Disp: , Rfl:     sennosides-docusate (PERICOLACE) 8.6-50 MG per tablet, Take 2 tablets by mouth 2 (Two) Times a Day As Needed for Constipation., Disp: 30 tablet, Rfl: 0    Allergies   Allergen Reactions    Hydrocodone Unknown - Low Severity    Keflex [Cephalexin] Mental Status Change and Provider Review Needed    Morphine Mental Status Change    Premarin [Conjugated Estrogens] Mental Status Change       Family History   Problem Relation Age of Onset    No Known Problems Mother     No Known Problems Father     No Known Problems Sister     No Known Problems Brother        Cancer-related family history is not on file.    Social History     Tobacco Use    Smoking status: Former     Current packs/day: 1.50     Average packs/day: 1.5 packs/day for 54.3 years (81.5 ttl pk-yrs)     Types: Cigarettes     Start date: 1971    Smokeless tobacco: Never   Vaping Use    Vaping status: Never Used   Substance Use Topics    Alcohol use: Never    Drug use: Never       I have reviewed and confirmed the accuracy of the patient's history: Chief complaint, HPI, ROS, and Subjective as entered by the MA/LPN/RN. Laury Castillo MD 04/28/25        SUBJECTIVE:     Patient is here today for routine follow-up and does not have any new issues.  She remains on 3 L of oxygen continuous    Patient denies any new issues    Here today for follow-up.  She denies any new issues.  Her surveillance CT scans have been favorable with no evidence of progressive disease.        REVIEW OF SYSTEMS:    Review of Systems   Constitutional:  Positive for fatigue. Negative for  "chills and fever.   HENT:  Negative for congestion, drooling, ear discharge, rhinorrhea, sinus pressure and tinnitus.    Eyes:  Negative for photophobia, pain and discharge.   Respiratory:  Negative for apnea, choking and stridor.    Cardiovascular:  Negative for palpitations.   Gastrointestinal:  Negative for abdominal distention, abdominal pain and anal bleeding.   Endocrine: Negative for polydipsia and polyphagia.   Genitourinary:  Negative for decreased urine volume, flank pain and genital sores.   Musculoskeletal:  Negative for gait problem, neck pain and neck stiffness.   Skin:  Negative for color change, rash and wound.   Neurological:  Positive for weakness. Negative for tremors, seizures, syncope, facial asymmetry and speech difficulty.   Hematological:  Negative for adenopathy.   Psychiatric/Behavioral:  Negative for agitation, confusion, hallucinations and self-injury. The patient is not hyperactive.        OBJECTIVE:    Vitals:    04/28/25 1152   BP: 148/72   Pulse: 55   Resp: 20   Temp: 97.2 °F (36.2 °C)   TempSrc: Temporal   SpO2: 96%  Comment: 2 L oxygen   Weight: 112 kg (246 lb)   Height: 172.7 cm (68\")   PainSc: 3    PainLoc: Back             Body mass index is 37.4 kg/m².    ECOG  (1) Restricted in physically strenuous activity, ambulatory and able to do work of light nature    Physical Exam  Vitals and nursing note reviewed.   Constitutional:       General: She is not in acute distress.     Appearance: She is not diaphoretic.   HENT:      Head: Normocephalic and atraumatic.   Eyes:      General: No scleral icterus.        Right eye: No discharge.         Left eye: No discharge.      Conjunctiva/sclera: Conjunctivae normal.   Neck:      Thyroid: No thyromegaly.   Cardiovascular:      Rate and Rhythm: Normal rate and regular rhythm.      Heart sounds: Normal heart sounds.      No friction rub. No gallop.   Pulmonary:      Effort: Pulmonary effort is normal. No respiratory distress.      Breath " sounds: No stridor. Rhonchi present. No wheezing.   Abdominal:      General: Bowel sounds are normal.      Palpations: Abdomen is soft. There is no mass.      Tenderness: There is no abdominal tenderness. There is no guarding or rebound.   Musculoskeletal:         General: No tenderness. Normal range of motion.      Cervical back: Normal range of motion and neck supple.   Lymphadenopathy:      Cervical: No cervical adenopathy.   Skin:     General: Skin is warm.      Findings: No erythema or rash.   Neurological:      Mental Status: She is alert and oriented to person, place, and time.      Motor: No abnormal muscle tone.   Psychiatric:         Behavior: Behavior normal.       I have reexamined the patient and the results are consistent with the previously documented exam. Laury Castillo MD        RECENT LABS      WBC   Date Value Ref Range Status   04/28/2025 8.63 3.40 - 10.80 10*3/mm3 Final     RBC   Date Value Ref Range Status   04/28/2025 4.01 3.77 - 5.28 10*6/mm3 Final     Hemoglobin   Date Value Ref Range Status   04/28/2025 12.2 12.0 - 15.9 g/dL Final     Hematocrit   Date Value Ref Range Status   04/28/2025 38.5 34.0 - 46.6 % Final     MCV   Date Value Ref Range Status   04/28/2025 96.0 79.0 - 97.0 fL Final     MCH   Date Value Ref Range Status   04/28/2025 30.4 26.6 - 33.0 pg Final     MCHC   Date Value Ref Range Status   04/28/2025 31.7 31.5 - 35.7 g/dL Final     RDW   Date Value Ref Range Status   04/28/2025 13.3 12.3 - 15.4 % Final     RDW-SD   Date Value Ref Range Status   04/28/2025 45.2 37.0 - 54.0 fl Final     MPV   Date Value Ref Range Status   04/28/2025 9.3 6.0 - 12.0 fL Final     Platelets   Date Value Ref Range Status   04/28/2025 124 (L) 140 - 450 10*3/mm3 Final     Neutrophil %   Date Value Ref Range Status   04/28/2025 69.2 42.7 - 76.0 % Final     Lymphocyte %   Date Value Ref Range Status   04/28/2025 21.0 19.6 - 45.3 % Final     Monocyte %   Date Value Ref Range Status   04/28/2025  5.1 5.0 - 12.0 % Final     Eosinophil %   Date Value Ref Range Status   04/28/2025 4.1 0.3 - 6.2 % Final     Basophil %   Date Value Ref Range Status   04/28/2025 0.6 0.0 - 1.5 % Final     Immature Grans %   Date Value Ref Range Status   05/12/2024 0.8 (H) 0.0 - 0.5 % Final     Neutrophils, Absolute   Date Value Ref Range Status   04/28/2025 5.98 1.70 - 7.00 10*3/mm3 Final     Lymphocytes, Absolute   Date Value Ref Range Status   04/28/2025 1.81 0.70 - 3.10 10*3/mm3 Final     Monocytes, Absolute   Date Value Ref Range Status   04/28/2025 0.44 0.10 - 0.90 10*3/mm3 Final     Eosinophils, Absolute   Date Value Ref Range Status   04/28/2025 0.35 0.00 - 0.40 10*3/mm3 Final     Basophils, Absolute   Date Value Ref Range Status   04/28/2025 0.05 0.00 - 0.20 10*3/mm3 Final     Immature Grans, Absolute   Date Value Ref Range Status   05/12/2024 0.08 (H) 0.00 - 0.05 10*3/mm3 Final     nRBC   Date Value Ref Range Status   05/12/2024 0.0 0.0 - 0.2 /100 WBC Final       Lab Results   Component Value Date    GLUCOSE 109 (H) 06/18/2024    BUN 12 06/18/2024    CREATININE 0.63 06/18/2024    BCR 19.0 06/18/2024    K 3.8 06/18/2024    CO2 34.9 (H) 06/18/2024    CALCIUM 9.4 06/18/2024    ALBUMIN 3.7 05/13/2024    AST 15 05/13/2024    ALT 51 (H) 05/13/2024         Assessment & Plan     Lung mass  - CBC & Differential    Other abnormality of red blood cells  - CBC & Differential        ASSESSMENT:    Adenocarcinoma of the lung Presenting as a stage IA3.  Status post ION bronchoscopy/EBUS.  Continue surveillance imaging.  Status post SBRT to lung cancer: Dr. Soto  Polycythemia: Hemoglobin 18.2 g/dL/hematocrit 61.9, normal WBC.  EPO is low at 2.5 range is 2.6-18.5 worrisome for myeloproliferative disease..  JAK2 negative, MPL panel was negative hemoglobin is down to 12 g per DL.  CBC reviewed  Chronic hypoxemia: Now on oxygen which may have helped the polycythemia  Thrombocytopenia: No baseline.  In the setting of cirrhosis and  splenomegaly.  Will assess for nutritional deficiencies.  Platelets have normalized 242,000.  Normal B12.  Folate low at 4.27.  Continue folic acid 1 mg p.o. daily x 3 months.  Iatrogenic elevated iron levels.  HFE analysis did not show any mutation.  Patient has been asked to discontinue oral iron supplementation.  Cirrhosis of the liver: New diagnosis, seen on Ultrasound of the abdomen.  Referred to GI.  Appointment is pending  Folate deficiency patient is on folic acid replacement therapy.  1 mg p.o. daily  History of CHF: Patient encouraged to follow-up with cardiologist  Posthospital visit     PLANS      Completed SBRT to lung malignancy  Continue surveillance CT scans ordered by Dr. Soto  Comprehensive MPL panel was negative  Referred to GI for possible cirrhosis.  She will have EGD colonoscopy coming up soon.  Also she is being worked up for the cirrhosis  Repeat iron studies in approximately 6 months.  Hemoglobin 13.1 g per DL  Follow-up in 4 months or earlier as needed  All questions answered           Electronically signed by Laury Castillo MD, 04/28/25, 5:41 PM EDT.

## 2025-04-28 ENCOUNTER — OFFICE VISIT (OUTPATIENT)
Dept: ONCOLOGY | Facility: CLINIC | Age: 65
End: 2025-04-28
Payer: MEDICARE

## 2025-04-28 ENCOUNTER — LAB (OUTPATIENT)
Dept: LAB | Facility: HOSPITAL | Age: 65
End: 2025-04-28
Payer: MEDICARE

## 2025-04-28 VITALS
OXYGEN SATURATION: 96 % | TEMPERATURE: 97.2 F | DIASTOLIC BLOOD PRESSURE: 72 MMHG | RESPIRATION RATE: 20 BRPM | SYSTOLIC BLOOD PRESSURE: 148 MMHG | HEIGHT: 68 IN | HEART RATE: 55 BPM | WEIGHT: 246 LBS | BODY MASS INDEX: 37.28 KG/M2

## 2025-04-28 DIAGNOSIS — R79.0 RAISED SERUM IRON: ICD-10-CM

## 2025-04-28 DIAGNOSIS — R91.8 LUNG MASS: Primary | ICD-10-CM

## 2025-04-28 DIAGNOSIS — R71.8 OTHER ABNORMALITY OF RED BLOOD CELLS: ICD-10-CM

## 2025-04-28 PROBLEM — B37.2 YEAST DERMATITIS: Status: ACTIVE | Noted: 2025-04-28

## 2025-04-28 LAB
BASOPHILS # BLD AUTO: 0.05 10*3/MM3 (ref 0–0.2)
BASOPHILS NFR BLD AUTO: 0.6 % (ref 0–1.5)
DEPRECATED RDW RBC AUTO: 45.2 FL (ref 37–54)
EOSINOPHIL # BLD AUTO: 0.35 10*3/MM3 (ref 0–0.4)
EOSINOPHIL NFR BLD AUTO: 4.1 % (ref 0.3–6.2)
ERYTHROCYTE [DISTWIDTH] IN BLOOD BY AUTOMATED COUNT: 13.3 % (ref 12.3–15.4)
HCT VFR BLD AUTO: 38.5 % (ref 34–46.6)
HGB BLD-MCNC: 12.2 G/DL (ref 12–15.9)
HOLD SPECIMEN: NORMAL
HOLD SPECIMEN: NORMAL
LYMPHOCYTES # BLD AUTO: 1.81 10*3/MM3 (ref 0.7–3.1)
LYMPHOCYTES NFR BLD AUTO: 21 % (ref 19.6–45.3)
MCH RBC QN AUTO: 30.4 PG (ref 26.6–33)
MCHC RBC AUTO-ENTMCNC: 31.7 G/DL (ref 31.5–35.7)
MCV RBC AUTO: 96 FL (ref 79–97)
MONOCYTES # BLD AUTO: 0.44 10*3/MM3 (ref 0.1–0.9)
MONOCYTES NFR BLD AUTO: 5.1 % (ref 5–12)
NEUTROPHILS NFR BLD AUTO: 5.98 10*3/MM3 (ref 1.7–7)
NEUTROPHILS NFR BLD AUTO: 69.2 % (ref 42.7–76)
PLATELET # BLD AUTO: 124 10*3/MM3 (ref 140–450)
PMV BLD AUTO: 9.3 FL (ref 6–12)
RBC # BLD AUTO: 4.01 10*6/MM3 (ref 3.77–5.28)
WBC NRBC COR # BLD AUTO: 8.63 10*3/MM3 (ref 3.4–10.8)

## 2025-04-28 PROCEDURE — 85025 COMPLETE CBC W/AUTO DIFF WBC: CPT

## 2025-04-28 PROCEDURE — 36415 COLL VENOUS BLD VENIPUNCTURE: CPT

## 2025-04-28 RX ORDER — FAMOTIDINE 40 MG/1
40 TABLET, FILM COATED ORAL
COMMUNITY
Start: 2025-04-24

## 2025-04-29 ENCOUNTER — TRANSCRIBE ORDERS (OUTPATIENT)
Dept: ADMINISTRATIVE | Facility: HOSPITAL | Age: 65
End: 2025-04-29
Payer: MEDICARE

## 2025-04-29 DIAGNOSIS — R19.4 ALTERED BOWEL HABITS: ICD-10-CM

## 2025-04-29 DIAGNOSIS — K74.60 HEPATIC CIRRHOSIS, UNSPECIFIED HEPATIC CIRRHOSIS TYPE, UNSPECIFIED WHETHER ASCITES PRESENT: Primary | ICD-10-CM

## 2025-04-29 DIAGNOSIS — R68.81 EARLY SATIETY: ICD-10-CM

## 2025-04-29 DIAGNOSIS — R11.0 NAUSEA: ICD-10-CM

## 2025-04-29 DIAGNOSIS — R12 HEARTBURN: ICD-10-CM

## 2025-06-14 ENCOUNTER — HOSPITAL ENCOUNTER (OUTPATIENT)
Dept: ULTRASOUND IMAGING | Facility: HOSPITAL | Age: 65
Discharge: HOME | End: 2025-06-14
Payer: MEDICARE

## 2025-06-14 DIAGNOSIS — R19.4 ALTERED BOWEL HABITS: ICD-10-CM

## 2025-06-14 DIAGNOSIS — R12 HEARTBURN: ICD-10-CM

## 2025-06-14 DIAGNOSIS — K74.60 HEPATIC CIRRHOSIS, UNSPECIFIED HEPATIC CIRRHOSIS TYPE, UNSPECIFIED WHETHER ASCITES PRESENT: ICD-10-CM

## 2025-06-14 DIAGNOSIS — R68.81 EARLY SATIETY: ICD-10-CM

## 2025-06-14 DIAGNOSIS — R11.0 NAUSEA: ICD-10-CM

## 2025-06-14 DIAGNOSIS — K74.69 OTHER CIRRHOSIS OF LIVER: ICD-10-CM

## 2025-06-14 PROCEDURE — 76705 ECHO EXAM OF ABDOMEN: CPT

## 2025-08-11 ENCOUNTER — TELEPHONE (OUTPATIENT)
Dept: ONCOLOGY | Facility: CLINIC | Age: 65
End: 2025-08-11
Payer: MEDICARE

## 2025-08-13 ENCOUNTER — PATIENT OUTREACH (OUTPATIENT)
Dept: ONCOLOGY | Facility: CLINIC | Age: 65
End: 2025-08-13
Payer: MEDICARE

## 2025-08-15 ENCOUNTER — TELEPHONE (OUTPATIENT)
Dept: ONCOLOGY | Facility: CLINIC | Age: 65
End: 2025-08-15
Payer: MEDICARE

## 2025-08-25 ENCOUNTER — HOSPITAL ENCOUNTER (OUTPATIENT)
Dept: PET IMAGING | Facility: HOSPITAL | Age: 65
Discharge: HOME | End: 2025-08-25
Admitting: STUDENT IN AN ORGANIZED HEALTH CARE EDUCATION/TRAINING PROGRAM
Payer: MEDICARE

## 2025-08-25 DIAGNOSIS — C34.31 ADENOCARCINOMA OF LOWER LOBE OF RIGHT LUNG: ICD-10-CM

## 2025-08-25 PROCEDURE — 71250 CT THORAX DX C-: CPT

## (undated) DEVICE — BIOPSY NEEDLE, 23G: Brand: FLEXISION

## (undated) DEVICE — BAPTIST FLOYD BRONCHOSCOPY: Brand: MEDLINE INDUSTRIES, INC.

## (undated) DEVICE — CATHETER GUIDE

## (undated) DEVICE — Device: Brand: ERBE

## (undated) DEVICE — Device: Brand: BALLOON

## (undated) DEVICE — PRESSURE TUBING: Brand: TRUWAVE

## (undated) DEVICE — ST NDL BRONCH ASP VIZISHOT 2 FLX 22G

## (undated) DEVICE — BIOPSY NEEDLE, 21G: Brand: FLEXISION

## (undated) DEVICE — VISION PROBE ADAPTER AND SUCTION ADAPTER

## (undated) DEVICE — Device: Brand: SINGLE USE ASPIRATION NEEDLE NA-U401SX

## (undated) DEVICE — CATHETER: Brand: ION

## (undated) DEVICE — Device: Brand: ION

## (undated) DEVICE — VISION PROBE: Brand: ION

## (undated) DEVICE — SWIVEL CONNECTOR

## (undated) DEVICE — PRESSURE MONITORING ACCESSORY: Brand: TRUWAVE